# Patient Record
Sex: FEMALE | Race: WHITE | NOT HISPANIC OR LATINO
[De-identification: names, ages, dates, MRNs, and addresses within clinical notes are randomized per-mention and may not be internally consistent; named-entity substitution may affect disease eponyms.]

---

## 2020-10-02 PROBLEM — Z00.00 ENCOUNTER FOR PREVENTIVE HEALTH EXAMINATION: Status: ACTIVE | Noted: 2020-10-02

## 2020-10-05 ENCOUNTER — APPOINTMENT (OUTPATIENT)
Dept: CARDIOLOGY | Facility: CLINIC | Age: 65
End: 2020-10-05
Payer: COMMERCIAL

## 2020-10-05 ENCOUNTER — NON-APPOINTMENT (OUTPATIENT)
Age: 65
End: 2020-10-05

## 2020-10-05 VITALS
HEIGHT: 68 IN | OXYGEN SATURATION: 100 % | BODY MASS INDEX: 24.71 KG/M2 | WEIGHT: 163 LBS | HEART RATE: 67 BPM | DIASTOLIC BLOOD PRESSURE: 87 MMHG | SYSTOLIC BLOOD PRESSURE: 129 MMHG

## 2020-10-05 PROCEDURE — 99205 OFFICE O/P NEW HI 60 MIN: CPT

## 2020-10-05 PROCEDURE — 93000 ELECTROCARDIOGRAM COMPLETE: CPT

## 2020-10-23 ENCOUNTER — NON-APPOINTMENT (OUTPATIENT)
Age: 65
End: 2020-10-23

## 2020-10-23 NOTE — REASON FOR VISIT
[Consultation] : a consultation regarding [Mitral Regurgitation] : mitral regurgitation [Prosthetic Valve] : a prosthetic valve

## 2020-10-23 NOTE — PHYSICAL EXAM
[General Appearance - Well Developed] : well developed [Normal Appearance] : normal appearance [Well Groomed] : well groomed [General Appearance - Well Nourished] : well nourished [No Deformities] : no deformities [General Appearance - In No Acute Distress] : no acute distress [Normal Conjunctiva] : the conjunctiva exhibited no abnormalities [Eyelids - No Xanthelasma] : the eyelids demonstrated no xanthelasmas [Normal Oral Mucosa] : normal oral mucosa [No Oral Pallor] : no oral pallor [No Oral Cyanosis] : no oral cyanosis [Normal Jugular Venous A Waves Present] : normal jugular venous A waves present [Normal Jugular Venous V Waves Present] : normal jugular venous V waves present [No Jugular Venous Padilla A Waves] : no jugular venous padilla A waves [Heart Rate And Rhythm] : heart rate and rhythm were normal [Heart Sounds] : normal S1 and S2 [Systolic grade ___/6] : A grade [unfilled]/6 systolic murmur was heard. [Respiration, Rhythm And Depth] : normal respiratory rhythm and effort [Exaggerated Use Of Accessory Muscles For Inspiration] : no accessory muscle use [Auscultation Breath Sounds / Voice Sounds] : lungs were clear to auscultation bilaterally [Abdomen Soft] : soft [Abdomen Tenderness] : non-tender [Abdomen Mass (___ Cm)] : no abdominal mass palpated [Abnormal Walk] : normal gait [Gait - Sufficient For Exercise Testing] : the gait was sufficient for exercise testing [Nail Clubbing] : no clubbing of the fingernails [Cyanosis, Localized] : no localized cyanosis [Petechial Hemorrhages (___cm)] : no petechial hemorrhages [Skin Color & Pigmentation] : normal skin color and pigmentation [] : no rash [No Venous Stasis] : no venous stasis [Skin Lesions] : no skin lesions [No Skin Ulcers] : no skin ulcer [No Xanthoma] : no  xanthoma was observed [Oriented To Time, Place, And Person] : oriented to person, place, and time [Affect] : the affect was normal [Mood] : the mood was normal [No Anxiety] : not feeling anxious

## 2020-10-23 NOTE — DISCUSSION/SUMMARY
[FreeTextEntry1] : Patient with a bioprosthetic valve (29mm Giovanna Jackson) in the mitral position with prosthetic valve dysfunction.  A case can be made for either repeat surgical valve replacement or a mitral valve in valve procedure.  This was discussed with the patient as well as the need for a diagnostic right and left heart catheterization.

## 2020-10-26 ENCOUNTER — APPOINTMENT (OUTPATIENT)
Dept: DISASTER EMERGENCY | Facility: CLINIC | Age: 65
End: 2020-10-26

## 2020-10-26 DIAGNOSIS — Z01.818 ENCOUNTER FOR OTHER PREPROCEDURAL EXAMINATION: ICD-10-CM

## 2020-10-27 LAB — SARS-COV-2 N GENE NPH QL NAA+PROBE: NOT DETECTED

## 2020-10-28 ENCOUNTER — TRANSCRIPTION ENCOUNTER (OUTPATIENT)
Age: 65
End: 2020-10-28

## 2020-10-28 ENCOUNTER — OUTPATIENT (OUTPATIENT)
Dept: OUTPATIENT SERVICES | Facility: HOSPITAL | Age: 65
LOS: 1 days | End: 2020-10-28
Payer: COMMERCIAL

## 2020-10-28 VITALS
DIASTOLIC BLOOD PRESSURE: 69 MMHG | OXYGEN SATURATION: 98 % | SYSTOLIC BLOOD PRESSURE: 113 MMHG | TEMPERATURE: 98 F | RESPIRATION RATE: 18 BRPM | HEART RATE: 57 BPM

## 2020-10-28 VITALS — RESPIRATION RATE: 18 BRPM | DIASTOLIC BLOOD PRESSURE: 76 MMHG | HEART RATE: 58 BPM | SYSTOLIC BLOOD PRESSURE: 150 MMHG

## 2020-10-28 DIAGNOSIS — Z95.2 PRESENCE OF PROSTHETIC HEART VALVE: Chronic | ICD-10-CM

## 2020-10-28 DIAGNOSIS — R06.00 DYSPNEA, UNSPECIFIED: ICD-10-CM

## 2020-10-28 DIAGNOSIS — Z98.890 OTHER SPECIFIED POSTPROCEDURAL STATES: Chronic | ICD-10-CM

## 2020-10-28 DIAGNOSIS — Z95.2 PRESENCE OF PROSTHETIC HEART VALVE: ICD-10-CM

## 2020-10-28 LAB
ANION GAP SERPL CALC-SCNC: 11 MMOL/L — SIGNIFICANT CHANGE UP (ref 5–17)
APTT BLD: 29.4 SEC — SIGNIFICANT CHANGE UP (ref 27.5–35.5)
BUN SERPL-MCNC: 26 MG/DL — HIGH (ref 8–20)
CALCIUM SERPL-MCNC: 9.1 MG/DL — SIGNIFICANT CHANGE UP (ref 8.6–10.2)
CHLORIDE SERPL-SCNC: 102 MMOL/L — SIGNIFICANT CHANGE UP (ref 98–107)
CO2 SERPL-SCNC: 26 MMOL/L — SIGNIFICANT CHANGE UP (ref 22–29)
CREAT SERPL-MCNC: 0.76 MG/DL — SIGNIFICANT CHANGE UP (ref 0.5–1.3)
GLUCOSE SERPL-MCNC: 114 MG/DL — HIGH (ref 70–99)
HCT VFR BLD CALC: 37.5 % — SIGNIFICANT CHANGE UP (ref 34.5–45)
HGB BLD-MCNC: 12.2 G/DL — SIGNIFICANT CHANGE UP (ref 11.5–15.5)
INR BLD: 1.03 RATIO — SIGNIFICANT CHANGE UP (ref 0.88–1.16)
MAGNESIUM SERPL-MCNC: 1.9 MG/DL — SIGNIFICANT CHANGE UP (ref 1.6–2.6)
MCHC RBC-ENTMCNC: 30.3 PG — SIGNIFICANT CHANGE UP (ref 27–34)
MCHC RBC-ENTMCNC: 32.5 GM/DL — SIGNIFICANT CHANGE UP (ref 32–36)
MCV RBC AUTO: 93.3 FL — SIGNIFICANT CHANGE UP (ref 80–100)
PLATELET # BLD AUTO: 210 K/UL — SIGNIFICANT CHANGE UP (ref 150–400)
POTASSIUM SERPL-MCNC: 4.6 MMOL/L — SIGNIFICANT CHANGE UP (ref 3.5–5.3)
POTASSIUM SERPL-SCNC: 4.6 MMOL/L — SIGNIFICANT CHANGE UP (ref 3.5–5.3)
PROTHROM AB SERPL-ACNC: 11.9 SEC — SIGNIFICANT CHANGE UP (ref 10.6–13.6)
RBC # BLD: 4.02 M/UL — SIGNIFICANT CHANGE UP (ref 3.8–5.2)
RBC # FLD: 12.1 % — SIGNIFICANT CHANGE UP (ref 10.3–14.5)
SODIUM SERPL-SCNC: 139 MMOL/L — SIGNIFICANT CHANGE UP (ref 135–145)
WBC # BLD: 5.56 K/UL — SIGNIFICANT CHANGE UP (ref 3.8–10.5)
WBC # FLD AUTO: 5.56 K/UL — SIGNIFICANT CHANGE UP (ref 3.8–10.5)

## 2020-10-28 PROCEDURE — 93460 R&L HRT ART/VENTRICLE ANGIO: CPT

## 2020-10-28 PROCEDURE — C1760: CPT

## 2020-10-28 PROCEDURE — C1725: CPT

## 2020-10-28 PROCEDURE — 93010 ELECTROCARDIOGRAM REPORT: CPT

## 2020-10-28 PROCEDURE — 83735 ASSAY OF MAGNESIUM: CPT

## 2020-10-28 PROCEDURE — C1769: CPT

## 2020-10-28 PROCEDURE — 80048 BASIC METABOLIC PNL TOTAL CA: CPT

## 2020-10-28 PROCEDURE — 99152 MOD SED SAME PHYS/QHP 5/>YRS: CPT

## 2020-10-28 PROCEDURE — 85610 PROTHROMBIN TIME: CPT

## 2020-10-28 PROCEDURE — 85027 COMPLETE CBC AUTOMATED: CPT

## 2020-10-28 PROCEDURE — 93005 ELECTROCARDIOGRAM TRACING: CPT

## 2020-10-28 PROCEDURE — C1894: CPT

## 2020-10-28 PROCEDURE — 85730 THROMBOPLASTIN TIME PARTIAL: CPT

## 2020-10-28 PROCEDURE — 36415 COLL VENOUS BLD VENIPUNCTURE: CPT

## 2020-10-28 PROCEDURE — C1889: CPT

## 2020-10-28 PROCEDURE — 99153 MOD SED SAME PHYS/QHP EA: CPT

## 2020-10-28 PROCEDURE — C1887: CPT

## 2020-10-28 NOTE — DISCHARGE NOTE PROVIDER - HOSPITAL COURSE
Narrative: 66 y/o F, never smoker, with a PMHx of left breast cancer (s/p XRT and lumpectomy; 2009), BioMVR with MAZE (Bovine), pAF (not on ac) presents today in anticipation of a right and left heart cardiac catheterization with Dr. Wilkerson due to progressive STEWART. Pt reports she is getting winded with minimal exertion/activity over the last 3 weeks.  She reports occasional lower extremity edema as well as increased girth notable to her abdomen. She denies chest pain and  palpitations. She is not on anticoagulation; she reports she was formerly on warfarin, however, it was discontinued 1 year ago and she is maintained on aspirin since then.  She is also on dofetilide and metoprolol for pAF; she is currently in NSR.  Of note, prior to her MVR in 2013; her cardiac catheterization report revealed no obstructive disease.    Echo 8/26/20 normal LV function, EF 60%, LA mildly dilated, interatrial septum with small PFO, well visualized and abnormally functioning bioprosthetic (bovine) valve in mitral position, mild MS, mod MR, mod severe eccentric TR  SARINA 9/19/20 The LV wall motion is normal, RV global function is normal, no thrombus, bioprosthetic mitral valve, moderate prosthesis regurgitation transvalvular moderate prosthesis stenosis, aortic valve is thin, trileaflet and normal cusp separation, moderate to severe TR, mild atheroma in the ascending aorta; TR Vmax 3.07m/s    Now s/p R&LHC via RRA and RBV (no site complications)  VENTRICLES: Global left ventricular function was normal. EF calculated by  contrast ventriculography was 65 %. A bio-prosthetic valve is noted in the  Mitral position, moving in consonance with the surrounding structures.  VALVES: MITRAL VALVE: The mitral valve exhibited mild regurgitation.  CORONARY VESSELS: The coronary circulation is right dominant.  LM:   --  LM: Normal.  LAD:   --  LAD: Normal.  CX:   --  Circumflex: Normal.  RCA:   --  RCA: Normal.  COMPLICATIONS: There were no complications. No complications occurred  during the cath lab visit.  DIAGNOSTIC IMPRESSIONS: Moderate Pulmonary Hypertension. 2. A  Bio-prosthesis is noted in the Mitral postion moving in consonance with  the surrounding structures and MVA of 1.9cm2 and mild to moderate MR. 3.  Normal Coronary Arteries.  DIAGNOSTIC RECOMMENDATIONS: OMT. 2. RFM. 3. May resume OAC tonite if  successful hemostasis achieved with stable cath sites.  INTERVENTIONAL IMPRESSIONS: Moderate Pulmonary Hypertension. 2. A  Bio-prosthesis is noted in the Mitral postion moving inconsonance with  the surrounding structures and MVA of 1.9cm2 and mild to moderate MR. 3.  Normal Coronary Arteries.  INTERVENTIONAL RECOMMENDATIONS: OMT. 2. RFM. 3. May resume OAC tonite if  successful hemostasis achieved with stable cath sites.  Prepared and signed by  John Wilkerson M.D.

## 2020-10-28 NOTE — H&P PST ADULT - HISTORY OF PRESENT ILLNESS
Narrative: 64 y/o F, never smoker, with a PMHx of left breast cancer (s/p XRT and lumpectomy), BioMVR with MAZE (Bovine; on coumadin), atrial fibrillation presents today in anticipation of a right and left heart cardiac catheterization with Dr. Wilkerson due to progressive STEWART.     Echo 8/26/20 normal LV function, EF 60%, LA mildly dilated, interatrial septum with small PFO, well visualized and abnormally functioning bioprosthetic (bovine) valve in mitral position, mild MS, mod MR, mod severe eccentric TR  SARINA 9/19/20 The LV wall motion is normal, RV global function is normal, no thrombus, bioprosthetic mitral valve, moderate prosthesis regurgitation transvalvular moderate prosthesis stenosis, aortic valve is thin, trileaflet and normal cusp separation, moderate to severe TR, mild atheroma in the ascending aorta; TR Vmax 3.07m/s Narrative: 66 y/o F, never smoker, with a PMHx of left breast cancer (s/p XRT and lumpectomy), BioMVR with MAZE (Bovine; on coumadin), atrial fibrillation presents today in anticipation of a right and left heart cardiac catheterization with Dr. Wilkerson due to progressive STEWART.     Echo 8/26/20 normal LV function, EF 60%, LA mildly dilated, interatrial septum with small PFO, well visualized and abnormally functioning bioprosthetic (bovine) valve in mitral position, mild MS, mod MR, mod severe eccentric TR  SARINA 9/19/20 The LV wall motion is normal, RV global function is normal, no thrombus, bioprosthetic mitral valve, moderate prosthesis regurgitation transvalvular moderate prosthesis stenosis, aortic valve is thin, trileaflet and normal cusp separation, moderate to severe TR, mild atheroma in the ascending aorta; TR Vmax 3.07m/s    ASA  MALL  BRA Narrative: 64 y/o F, never smoker, with a PMHx of left breast cancer (s/p XRT and lumpectomy), BioMVR with MAZE (Bovine), pAF (not on ac) presents today in anticipation of a right and left heart cardiac catheterization with Dr. Wilkerson due to progressive STEWART. Pt reports she is getting winded with minimal exertion/activity over the last 3 weeks.  She reports occasional lower extremity edema as well as increased girth notable to her abdomen. She denies chest pain and  palpitations. She is not on anticoagulation; she reports she was formerly on warfarin, however, it was discontinued 1 year ago and she is maintained on aspirin since then.  She is also on dofetilide and metoprolol for pAF; she is currently in NSR.  Of note, prior to her MVR in 2013; her cardiac catheterization report revealed no obstructive disease.    Echo 8/26/20 normal LV function, EF 60%, LA mildly dilated, interatrial septum with small PFO, well visualized and abnormally functioning bioprosthetic (bovine) valve in mitral position, mild MS, mod MR, mod severe eccentric TR  SARINA 9/19/20 The LV wall motion is normal, RV global function is normal, no thrombus, bioprosthetic mitral valve, moderate prosthesis regurgitation transvalvular moderate prosthesis stenosis, aortic valve is thin, trileaflet and normal cusp separation, moderate to severe TR, mild atheroma in the ascending aorta; TR Vmax 3.07m/s    ASA 3  MALL 2  BRA 2.0% Narrative: 66 y/o F, never smoker, with a PMHx of left breast cancer (s/p XRT and lumpectomy; 2009), BioMVR with MAZE (Bovine), pAF (not on ac) presents today in anticipation of a right and left heart cardiac catheterization with Dr. Wilkerson due to progressive STEWART. Pt reports she is getting winded with minimal exertion/activity over the last 3 weeks.  She reports occasional lower extremity edema as well as increased girth notable to her abdomen. She denies chest pain and  palpitations. She is not on anticoagulation; she reports she was formerly on warfarin, however, it was discontinued 1 year ago and she is maintained on aspirin since then.  She is also on dofetilide and metoprolol for pAF; she is currently in NSR.  Of note, prior to her MVR in 2013; her cardiac catheterization report revealed no obstructive disease.    Echo 8/26/20 normal LV function, EF 60%, LA mildly dilated, interatrial septum with small PFO, well visualized and abnormally functioning bioprosthetic (bovine) valve in mitral position, mild MS, mod MR, mod severe eccentric TR  SARINA 9/19/20 The LV wall motion is normal, RV global function is normal, no thrombus, bioprosthetic mitral valve, moderate prosthesis regurgitation transvalvular moderate prosthesis stenosis, aortic valve is thin, trileaflet and normal cusp separation, moderate to severe TR, mild atheroma in the ascending aorta; TR Vmax 3.07m/s    ASA 3  MALL 2  BRA 2.0%

## 2020-10-28 NOTE — H&P PST ADULT - NEGATIVE CARDIOVASCULAR SYMPTOMS
no peripheral edema/no chest pain/no claudication/no orthopnea/no paroxysmal nocturnal dyspnea/no palpitations

## 2020-10-28 NOTE — DISCHARGE NOTE NURSING/CASE MANAGEMENT/SOCIAL WORK - PATIENT PORTAL LINK FT
You can access the FollowMyHealth Patient Portal offered by [facility] by registering at the following website: [email or web address]. By joining [facility]’s FollowMyHealth portal, you will also be able to view your health information using other applications (apps) compatible with our system.

## 2020-10-28 NOTE — H&P PST ADULT - NSICDXPASTSURGICALHX_GEN_ALL_CORE_FT
PAST SURGICAL HISTORY:  H/O prosthetic mitral valve bioprosthetic bovine 2013    S/P breast lumpectomy left

## 2020-10-28 NOTE — DISCHARGE NOTE PROVIDER - CARE PROVIDER_API CALL
IVETT JARAMILLO  [id number]  [address] [facility] [address]  [place] [address]  Phone: [phone]  Fax: [phone]  Follow Up Time:

## 2020-10-28 NOTE — DISCHARGE NOTE PROVIDER - NSDCCPCAREPLAN_GEN_ALL_CORE_FT
PRINCIPAL DISCHARGE DIAGNOSIS  Diagnosis: Dyspnea on exertion  Assessment and Plan of Treatment: -right radial artery precautions and right brachial vein precautions  -normal coronary vasculature and normal right heart pressures  -follow up with Dr. Schwartz  -continue all of your medications as directed       PRINCIPAL DISCHARGE DIAGNOSIS  Diagnosis: Dyspnea on exertion  Assessment and Plan of Treatment: -right radial artery precautions and right brachial vein precautions  -normal coronary vasculature and normal right heart pressures  -follow up with Dr. Schwartz  -continue all of your medications as directed  -diet and exercise modifications

## 2020-10-28 NOTE — DISCHARGE NOTE PROVIDER - NSDCCPTREATMENT_GEN_ALL_CORE_FT
PRINCIPAL PROCEDURE  Procedure: Combined right and left heart cardiac catheterization  Findings and Treatment: Restricted use with no heavy lifting of affected arm for 48 hours.  No submerging the arm in water for 48 hours.  You may start showering today.  Call your doctor for any bleeding, swelling, loss of sensation in the hand or fingers, or fingers turning blue.  If heavy bleeding or large lumps form, hold pressure at the spot and come to the Emergency Room.

## 2020-10-28 NOTE — DISCHARGE NOTE PROVIDER - NSDCMRMEDTOKEN_GEN_ALL_CORE_FT
Aspir 81 oral delayed release tablet: 1 tab(s) orally once a day  Metoprolol Succinate ER 25 mg oral tablet, extended release: 1 tab(s) orally once a day  rosuvastatin 20 mg oral tablet: 1 tab(s) orally once a day  Tikosyn 500 mcg oral capsule: 1 cap(s) orally 2 times a day

## 2020-10-28 NOTE — H&P PST ADULT - OTHER CARE PROVIDERS
Dr. Schwartz ([facility]), Dr. Wilkerson (Interventional Cardiologist), Dr. Zhang (CT Surgeon)

## 2020-10-28 NOTE — H&P PST ADULT - NSICDXPROBLEM_GEN_ALL_CORE_FT
PROBLEM DIAGNOSES  Problem: STEWART (dyspnea on exertion)  Assessment and Plan: -R&LHC with Dr. Wilkerson  -procedure d/w patient, risks and benefits explained, questions answered  -asa 81mg po pre cath

## 2020-10-28 NOTE — H&P PST ADULT - NEGATIVE NEUROLOGICAL SYMPTOMS
no vertigo/no loss of sensation/no transient paralysis/no weakness/no paresthesias/no headache/no loss of consciousness/no syncope/no tremors/no difficulty walking/no generalized seizures/no focal seizures

## 2020-10-28 NOTE — PROGRESS NOTE ADULT - SUBJECTIVE AND OBJECTIVE BOX
Department of Cardiology                                                                  [facility]/[place] [address] [address]                                                            Telephone: [phone]. Fax:[phone]                                             Post- Procedure Note: Right and Left Heart Cardiac Catheterization       Narrative:  65y  Female is now s/p right and left heart catheterization via right radial and brachial approach with Dr. Wilkerson  Right radial precautions  normal coronary vasculature  normal right heart pressures  mild TR  no cardiac intervention  Heparin used: 5,000 units  Contrast used: omnipaque 67ml         PAST MEDICAL & SURGICAL HISTORY:  Breast cancer  left; s/p lumpectomy and XRT    Afib    H/O prosthetic mitral valve  bioprosthetic bovine 2013    S/P breast lumpectomy  left    Home Medications:  Aspir 81 oral delayed release tablet: 1 tab(s) orally once a day (28 Oct 2020 09:55)  Metoprolol Succinate ER 25 mg oral tablet, extended release: 1 tab(s) orally once a day (28 Oct 2020 09:55)  rosuvastatin 20 mg oral tablet: 1 tab(s) orally once a day (28 Oct 2020 09:55)  Tikosyn 500 mcg oral capsule: 1 cap(s) orally 2 times a day (28 Oct 2020 09:55)    Patient is a 65y old  Female who presents with a chief complaint of right and left heart cardiac catheterization due to progressive STEWART (28 Oct 2020 11:39)    HEALTH ISSUES - PROBLEM Dx:  STEWART (dyspnea on exertion)    HPI:  Narrative: 66 y/o F, never smoker, with a PMHx of left breast cancer (s/p XRT and lumpectomy; 2009), BioMVR with MAZE (Bovine), pAF (not on ac) presents today in anticipation of a right and left heart cardiac catheterization with Dr. Wilkerson due to progressive STEWART. Pt reports she is getting winded with minimal exertion/activity over the last 3 weeks.  She reports occasional lower extremity edema as well as increased girth notable to her abdomen. She denies chest pain and  palpitations. She is not on anticoagulation; she reports she was formerly on warfarin, however, it was discontinued 1 year ago and she is maintained on aspirin since then.  She is also on dofetilide and metoprolol for pAF; she is currently in NSR.  Of note, prior to her MVR in 2013; her cardiac catheterization report revealed no obstructive disease.    Echo 8/26/20 normal LV function, EF 60%, LA mildly dilated, interatrial septum with small PFO, well visualized and abnormally functioning bioprosthetic (bovine) valve in mitral position, mild MS, mod MR, mod severe eccentric TR  SARINA 9/19/20 The LV wall motion is normal, RV global function is normal, no thrombus, bioprosthetic mitral valve, moderate prosthesis regurgitation transvalvular moderate prosthesis stenosis, aortic valve is thin, trileaflet and normal cusp separation, moderate to severe TR, mild atheroma in the ascending aorta; TR Vmax 3.07m/s    ASA 3  MALL 2  BRA 2.0% (28 Oct 2020 07:52)    General: No fatigue, no fevers/chills  Respiratory: No dyspnea, no cough, no wheeze  CV: No chest pain, no palpitations  Abd: No nausea  Neuro: No headache, no dizziness      Objective:  Vital Signs Last 24 Hrs  T(C): 36.6 (28 Oct 2020 09:15), Max: 36.6 (28 Oct 2020 09:15)  T(F): 97.8 (28 Oct 2020 09:15), Max: 97.8 (28 Oct 2020 09:15)  HR: 52 (28 Oct 2020 11:45) (51 - 57)  BP: 153/72 (28 Oct 2020 11:45) (113/69 - 153/72)  BP(mean): --  RR: 18 (28 Oct 2020 11:45) (18 - 18)  SpO2: 96% (28 Oct 2020 11:45) (95% - 98%)    CM: SR  Neuro: A&OX3, CN 2-12 intact  HEENT: NC, AT  Lungs: CTA B/L  CV: S1, S2, no murmur, RRR  Abd: Soft  Right Radial cath site: band in place, no bleeding, no hematoma  Extremity: + distal pulses                          12.2   5.56  )-----------( 210      ( 28 Oct 2020 09:11 )             37.5     10-28    139  |  102  |  26.0<H>  ----------------------------<  114<H>  4.6   |  26.0  |  0.76    Ca    9.1      28 Oct 2020 09:11  Mg     1.9     10-28      PT/INR - ( 28 Oct 2020 09:11 )   PT: 11.9 sec;   INR: 1.03 ratio    PTT - ( 28 Oct 2020 09:11 )  PTT:29.4 sec      65y  Female is now s/p right and left heart catheterization via right radial and brachial approach with Dr. Wilkerson    -post cardiac cath orders  -radial precautions  -remove radial band 1 hour post cath  -continue current medical therapy  -follow up outpt in 2 weeks with Cardiologist, [address]. Schwartz  -discussed plan of care with patient  -discussed diet and exercise modifications with patient  -dc home 1 hour post radial band removal if site ok

## 2021-06-05 NOTE — H&P PST ADULT - PRIMARY CARE PROVIDER
Pt arrived to unit from ED via transport. Transferred to bed from cart. Pts daughter bedside on arrival. Pt A&Ox4, tachy & hypertensive on 2L NC. MD notified, orders received. Tele applied per orders. 4 eyes skin assessment w/ Kami NCT. Pt denies pain at this time. All belongings bedside. Call light placed within reach. Pt in stable condition, will continue to monitor.    Dr. Tayo White (PCP)

## 2022-05-16 PROBLEM — I48.91 UNSPECIFIED ATRIAL FIBRILLATION: Chronic | Status: ACTIVE | Noted: 2020-10-28

## 2022-05-16 PROBLEM — C50.919 MALIGNANT NEOPLASM OF UNSPECIFIED SITE OF UNSPECIFIED FEMALE BREAST: Chronic | Status: ACTIVE | Noted: 2020-10-28

## 2022-06-08 ENCOUNTER — APPOINTMENT (OUTPATIENT)
Dept: ORTHOPEDIC SURGERY | Facility: CLINIC | Age: 67
End: 2022-06-08

## 2023-03-01 ENCOUNTER — OFFICE (OUTPATIENT)
Dept: URBAN - METROPOLITAN AREA CLINIC 103 | Facility: CLINIC | Age: 68
Setting detail: OPHTHALMOLOGY
End: 2023-03-01
Payer: MEDICARE

## 2023-03-01 DIAGNOSIS — D31.31: ICD-10-CM

## 2023-03-01 DIAGNOSIS — L71.0: ICD-10-CM

## 2023-03-01 DIAGNOSIS — H16.223: ICD-10-CM

## 2023-03-01 PROBLEM — H16.222 DRY EYE SYNDROME K SICCA; RIGHT EYE, LEFT EYE, BOTH EYES: Status: ACTIVE | Noted: 2023-03-01

## 2023-03-01 PROBLEM — H16.221 DRY EYE SYNDROME K SICCA; RIGHT EYE, LEFT EYE, BOTH EYES: Status: ACTIVE | Noted: 2023-03-01

## 2023-03-01 PROCEDURE — 92250 FUNDUS PHOTOGRAPHY W/I&R: CPT | Performed by: OPHTHALMOLOGY

## 2023-03-01 PROCEDURE — 92014 COMPRE OPH EXAM EST PT 1/>: CPT | Performed by: OPHTHALMOLOGY

## 2023-03-01 PROCEDURE — 83861 MICROFLUID ANALY TEARS: CPT | Performed by: OPHTHALMOLOGY

## 2023-03-01 ASSESSMENT — REFRACTION_MANIFEST
OS_AXIS: 35
OD_SPHERE: +0.75
OD_SPHERE: PLANO
OD_VA1: 20/20-
OD_VA1: 20/25-1
OS_ADD: +2.25
OD_CYLINDER: -0.50
OS_CYLINDER: SPH
OS_VA1: 20/20
OS_VA1: 20/25-2
OD_ADD: +2.25
OS_SPHERE: PLANO
OD_AXIS: 120
OS_SPHERE: +0.50
OU_VA: 20/20-1
OS_CYLINDER: -0.75
OD_CYLINDER: SPH

## 2023-03-01 ASSESSMENT — KERATOMETRY
OS_AXISANGLE_DEGREES: 112
OD_K2POWER_DIOPTERS: 44.25
OD_K1POWER_DIOPTERS: 43.50
OD_AXISANGLE_DEGREES: 067
METHOD_AUTO_MANUAL: AUTO
OS_K1POWER_DIOPTERS: 44.00
OS_K2POWER_DIOPTERS: 44.50

## 2023-03-01 ASSESSMENT — REFRACTION_AUTOREFRACTION
OD_CYLINDER: -0.50
OS_CYLINDER: -0.50
OS_SPHERE: PLANO
OS_AXIS: 032
OD_AXIS: 158
OD_SPHERE: +0.25

## 2023-03-01 ASSESSMENT — AXIALLENGTH_DERIVED: OD_AL: 23.455

## 2023-03-01 ASSESSMENT — CONFRONTATIONAL VISUAL FIELD TEST (CVF)
OD_FINDINGS: FULL
OS_FINDINGS: FULL

## 2023-03-01 ASSESSMENT — REFRACTION_CURRENTRX
OS_ADD: +1.50
OS_OVR_VA: 20/
OS_VPRISM_DIRECTION: SV
OD_OVR_VA: 20/
OD_ADD: +1.50
OD_VPRISM_DIRECTION: SV

## 2023-03-01 ASSESSMENT — VISUAL ACUITY
OD_BCVA: 20/20
OS_BCVA: 20/20

## 2023-03-01 ASSESSMENT — SUPERFICIAL PUNCTATE KERATITIS (SPK)
OD_SPK: ABSENT
OS_SPK: T

## 2023-03-01 ASSESSMENT — TONOMETRY
OD_IOP_MMHG: 11
OS_IOP_MMHG: 13

## 2023-03-01 ASSESSMENT — SPHEQUIV_DERIVED: OD_SPHEQUIV: 0

## 2023-09-06 ENCOUNTER — OFFICE (OUTPATIENT)
Dept: URBAN - METROPOLITAN AREA CLINIC 103 | Facility: CLINIC | Age: 68
Setting detail: OPHTHALMOLOGY
End: 2023-09-06
Payer: MEDICARE

## 2023-09-06 DIAGNOSIS — D31.31: ICD-10-CM

## 2023-09-06 PROBLEM — H16.223 DRY EYE SYNDROME K SICCA; RIGHT EYE, LEFT EYE, BOTH EYES: Status: ACTIVE | Noted: 2023-09-06

## 2023-09-06 PROBLEM — H16.222 DRY EYE SYNDROME K SICCA; RIGHT EYE, LEFT EYE, BOTH EYES: Status: ACTIVE | Noted: 2023-09-06

## 2023-09-06 PROBLEM — H16.221 DRY EYE SYNDROME K SICCA; RIGHT EYE, LEFT EYE, BOTH EYES: Status: ACTIVE | Noted: 2023-09-06

## 2023-09-06 PROCEDURE — 92014 COMPRE OPH EXAM EST PT 1/>: CPT | Performed by: OPHTHALMOLOGY

## 2023-09-06 PROCEDURE — 92201 OPSCPY EXTND RTA DRAW UNI/BI: CPT | Performed by: OPHTHALMOLOGY

## 2023-09-06 ASSESSMENT — REFRACTION_MANIFEST
OS_CYLINDER: SPH
OD_VA1: 20/25-1
OD_VA1: 20/20-
OS_SPHERE: +0.50
OD_CYLINDER: -0.50
OS_CYLINDER: -0.75
OD_ADD: +2.25
OS_VA1: 20/20
OD_AXIS: 120
OD_SPHERE: +0.75
OD_CYLINDER: SPH
OU_VA: 20/20-1
OS_SPHERE: PLANO
OS_AXIS: 35
OD_SPHERE: PLANO
OS_VA1: 20/25-2
OS_ADD: +2.25

## 2023-09-06 ASSESSMENT — REFRACTION_AUTOREFRACTION
OS_SPHERE: PLANO
OD_AXIS: 166
OS_AXIS: 15
OS_CYLINDER: -0.50
OD_SPHERE: PLANO
OD_CYLINDER: -0.50

## 2023-09-06 ASSESSMENT — KERATOMETRY
OS_AXISANGLE_DEGREES: 108
OD_K1POWER_DIOPTERS: 43.25
OS_K1POWER_DIOPTERS: 43.75
OS_K2POWER_DIOPTERS: 44.25
METHOD_AUTO_MANUAL: AUTO
OD_AXISANGLE_DEGREES: 076
OD_K2POWER_DIOPTERS: 44.25

## 2023-09-06 ASSESSMENT — REFRACTION_CURRENTRX
OD_OVR_VA: 20/
OS_ADD: +1.50
OD_ADD: +1.50
OS_OVR_VA: 20/
OD_VPRISM_DIRECTION: SV
OS_VPRISM_DIRECTION: SV

## 2023-09-06 ASSESSMENT — VISUAL ACUITY
OS_BCVA: 20/25+2
OD_BCVA: 20/20

## 2023-09-06 ASSESSMENT — SUPERFICIAL PUNCTATE KERATITIS (SPK)
OD_SPK: ABSENT
OS_SPK: T

## 2023-09-06 ASSESSMENT — CONFRONTATIONAL VISUAL FIELD TEST (CVF)
OD_FINDINGS: FULL
OS_FINDINGS: FULL

## 2023-09-06 ASSESSMENT — TONOMETRY
OD_IOP_MMHG: 11
OS_IOP_MMHG: 12

## 2023-11-14 ENCOUNTER — APPOINTMENT (OUTPATIENT)
Dept: CARDIOTHORACIC SURGERY | Facility: CLINIC | Age: 68
End: 2023-11-14
Payer: MEDICARE

## 2023-11-14 VITALS
TEMPERATURE: 98 F | DIASTOLIC BLOOD PRESSURE: 92 MMHG | HEART RATE: 71 BPM | RESPIRATION RATE: 16 BRPM | WEIGHT: 162 LBS | BODY MASS INDEX: 24.55 KG/M2 | SYSTOLIC BLOOD PRESSURE: 146 MMHG | OXYGEN SATURATION: 97 % | HEIGHT: 68 IN

## 2023-11-14 DIAGNOSIS — Z85.3 PERSONAL HISTORY OF MALIGNANT NEOPLASM OF BREAST: ICD-10-CM

## 2023-11-14 DIAGNOSIS — Z86.39 PERSONAL HISTORY OF OTHER ENDOCRINE, NUTRITIONAL AND METABOLIC DISEASE: ICD-10-CM

## 2023-11-14 DIAGNOSIS — Z80.6 FAMILY HISTORY OF LEUKEMIA: ICD-10-CM

## 2023-11-14 DIAGNOSIS — Z78.9 OTHER SPECIFIED HEALTH STATUS: ICD-10-CM

## 2023-11-14 DIAGNOSIS — Z60.2 PROBLEMS RELATED TO LIVING ALONE: ICD-10-CM

## 2023-11-14 DIAGNOSIS — Z80.3 FAMILY HISTORY OF MALIGNANT NEOPLASM OF BREAST: ICD-10-CM

## 2023-11-14 PROCEDURE — 99204 OFFICE O/P NEW MOD 45 MIN: CPT

## 2023-11-14 RX ORDER — ASPIRIN ENTERIC COATED TABLETS 81 MG 81 MG/1
81 TABLET, DELAYED RELEASE ORAL DAILY
Refills: 0 | Status: COMPLETED | COMMUNITY
Start: 2020-10-05 | End: 2023-11-14

## 2023-11-14 RX ORDER — DOFETILIDE 0.5 MG/1
500 CAPSULE ORAL TWICE DAILY
Refills: 0 | Status: COMPLETED | COMMUNITY
Start: 2020-08-11 | End: 2023-11-14

## 2023-11-14 RX ORDER — METRONIDAZOLE 7.5 MG/G
0.75 CREAM TOPICAL
Qty: 45 | Refills: 0 | Status: COMPLETED | COMMUNITY
Start: 2020-07-27 | End: 2023-11-14

## 2023-11-14 SDOH — SOCIAL STABILITY - SOCIAL INSECURITY: PROBLEMS RELATED TO LIVING ALONE: Z60.2

## 2024-01-02 ENCOUNTER — APPOINTMENT (OUTPATIENT)
Dept: CARDIOTHORACIC SURGERY | Facility: CLINIC | Age: 69
End: 2024-01-02

## 2024-01-05 ENCOUNTER — INPATIENT (INPATIENT)
Facility: HOSPITAL | Age: 69
LOS: 3 days | Discharge: HOME HEALTH | DRG: 314 | End: 2024-01-09
Attending: THORACIC SURGERY (CARDIOTHORACIC VASCULAR SURGERY) | Admitting: THORACIC SURGERY (CARDIOTHORACIC VASCULAR SURGERY)
Payer: MEDICARE

## 2024-01-05 VITALS
DIASTOLIC BLOOD PRESSURE: 66 MMHG | TEMPERATURE: 98 F | SYSTOLIC BLOOD PRESSURE: 131 MMHG | HEART RATE: 86 BPM | RESPIRATION RATE: 18 BRPM | OXYGEN SATURATION: 94 %

## 2024-01-05 DIAGNOSIS — Z98.890 OTHER SPECIFIED POSTPROCEDURAL STATES: Chronic | ICD-10-CM

## 2024-01-05 DIAGNOSIS — I36.1 NONRHEUMATIC TRICUSPID (VALVE) INSUFFICIENCY: ICD-10-CM

## 2024-01-05 DIAGNOSIS — Z95.2 PRESENCE OF PROSTHETIC HEART VALVE: Chronic | ICD-10-CM

## 2024-01-05 LAB
A1C WITH ESTIMATED AVERAGE GLUCOSE RESULT: 5.6 % — SIGNIFICANT CHANGE UP (ref 4–5.6)
A1C WITH ESTIMATED AVERAGE GLUCOSE RESULT: 5.6 % — SIGNIFICANT CHANGE UP (ref 4–5.6)
ABO RH CONFIRMATION: SIGNIFICANT CHANGE UP
ABO RH CONFIRMATION: SIGNIFICANT CHANGE UP
ALBUMIN SERPL ELPH-MCNC: 3.9 G/DL — SIGNIFICANT CHANGE UP (ref 3.3–5.2)
ALBUMIN SERPL ELPH-MCNC: 3.9 G/DL — SIGNIFICANT CHANGE UP (ref 3.3–5.2)
ALP SERPL-CCNC: 89 U/L — SIGNIFICANT CHANGE UP (ref 40–120)
ALP SERPL-CCNC: 89 U/L — SIGNIFICANT CHANGE UP (ref 40–120)
ALT FLD-CCNC: 23 U/L — SIGNIFICANT CHANGE UP
ALT FLD-CCNC: 23 U/L — SIGNIFICANT CHANGE UP
ANION GAP SERPL CALC-SCNC: 13 MMOL/L — SIGNIFICANT CHANGE UP (ref 5–17)
ANION GAP SERPL CALC-SCNC: 13 MMOL/L — SIGNIFICANT CHANGE UP (ref 5–17)
APTT BLD: 29.5 SEC — SIGNIFICANT CHANGE UP (ref 24.5–35.6)
APTT BLD: 29.5 SEC — SIGNIFICANT CHANGE UP (ref 24.5–35.6)
AST SERPL-CCNC: 24 U/L — SIGNIFICANT CHANGE UP
AST SERPL-CCNC: 24 U/L — SIGNIFICANT CHANGE UP
BASOPHILS # BLD AUTO: 0.02 K/UL — SIGNIFICANT CHANGE UP (ref 0–0.2)
BASOPHILS # BLD AUTO: 0.02 K/UL — SIGNIFICANT CHANGE UP (ref 0–0.2)
BASOPHILS NFR BLD AUTO: 0.4 % — SIGNIFICANT CHANGE UP (ref 0–2)
BASOPHILS NFR BLD AUTO: 0.4 % — SIGNIFICANT CHANGE UP (ref 0–2)
BILIRUB SERPL-MCNC: 0.6 MG/DL — SIGNIFICANT CHANGE UP (ref 0.4–2)
BILIRUB SERPL-MCNC: 0.6 MG/DL — SIGNIFICANT CHANGE UP (ref 0.4–2)
BLD GP AB SCN SERPL QL: SIGNIFICANT CHANGE UP
BLD GP AB SCN SERPL QL: SIGNIFICANT CHANGE UP
BUN SERPL-MCNC: 27.9 MG/DL — HIGH (ref 8–20)
BUN SERPL-MCNC: 27.9 MG/DL — HIGH (ref 8–20)
CALCIUM SERPL-MCNC: 9.2 MG/DL — SIGNIFICANT CHANGE UP (ref 8.4–10.5)
CALCIUM SERPL-MCNC: 9.2 MG/DL — SIGNIFICANT CHANGE UP (ref 8.4–10.5)
CHLORIDE SERPL-SCNC: 100 MMOL/L — SIGNIFICANT CHANGE UP (ref 96–108)
CHLORIDE SERPL-SCNC: 100 MMOL/L — SIGNIFICANT CHANGE UP (ref 96–108)
CK SERPL-CCNC: 39 U/L — SIGNIFICANT CHANGE UP (ref 25–170)
CK SERPL-CCNC: 39 U/L — SIGNIFICANT CHANGE UP (ref 25–170)
CO2 SERPL-SCNC: 26 MMOL/L — SIGNIFICANT CHANGE UP (ref 22–29)
CO2 SERPL-SCNC: 26 MMOL/L — SIGNIFICANT CHANGE UP (ref 22–29)
CREAT SERPL-MCNC: 1.28 MG/DL — SIGNIFICANT CHANGE UP (ref 0.5–1.3)
CREAT SERPL-MCNC: 1.28 MG/DL — SIGNIFICANT CHANGE UP (ref 0.5–1.3)
EGFR: 46 ML/MIN/1.73M2 — LOW
EGFR: 46 ML/MIN/1.73M2 — LOW
EOSINOPHIL # BLD AUTO: 0.09 K/UL — SIGNIFICANT CHANGE UP (ref 0–0.5)
EOSINOPHIL # BLD AUTO: 0.09 K/UL — SIGNIFICANT CHANGE UP (ref 0–0.5)
EOSINOPHIL NFR BLD AUTO: 1.6 % — SIGNIFICANT CHANGE UP (ref 0–6)
EOSINOPHIL NFR BLD AUTO: 1.6 % — SIGNIFICANT CHANGE UP (ref 0–6)
ESTIMATED AVERAGE GLUCOSE: 114 MG/DL — SIGNIFICANT CHANGE UP (ref 68–114)
ESTIMATED AVERAGE GLUCOSE: 114 MG/DL — SIGNIFICANT CHANGE UP (ref 68–114)
GLUCOSE SERPL-MCNC: 96 MG/DL — SIGNIFICANT CHANGE UP (ref 70–99)
GLUCOSE SERPL-MCNC: 96 MG/DL — SIGNIFICANT CHANGE UP (ref 70–99)
HCT VFR BLD CALC: 37.5 % — SIGNIFICANT CHANGE UP (ref 34.5–45)
HCT VFR BLD CALC: 37.5 % — SIGNIFICANT CHANGE UP (ref 34.5–45)
HGB BLD-MCNC: 13.1 G/DL — SIGNIFICANT CHANGE UP (ref 11.5–15.5)
HGB BLD-MCNC: 13.1 G/DL — SIGNIFICANT CHANGE UP (ref 11.5–15.5)
IMM GRANULOCYTES NFR BLD AUTO: 0.2 % — SIGNIFICANT CHANGE UP (ref 0–0.9)
IMM GRANULOCYTES NFR BLD AUTO: 0.2 % — SIGNIFICANT CHANGE UP (ref 0–0.9)
INR BLD: 1.13 RATIO — SIGNIFICANT CHANGE UP (ref 0.85–1.18)
INR BLD: 1.13 RATIO — SIGNIFICANT CHANGE UP (ref 0.85–1.18)
LYMPHOCYTES # BLD AUTO: 1.02 K/UL — SIGNIFICANT CHANGE UP (ref 1–3.3)
LYMPHOCYTES # BLD AUTO: 1.02 K/UL — SIGNIFICANT CHANGE UP (ref 1–3.3)
LYMPHOCYTES # BLD AUTO: 18.4 % — SIGNIFICANT CHANGE UP (ref 13–44)
LYMPHOCYTES # BLD AUTO: 18.4 % — SIGNIFICANT CHANGE UP (ref 13–44)
Lab: 244 PRU — SIGNIFICANT CHANGE UP (ref 195–417)
Lab: 244 PRU — SIGNIFICANT CHANGE UP (ref 195–417)
MAGNESIUM SERPL-MCNC: 2 MG/DL — SIGNIFICANT CHANGE UP (ref 1.6–2.6)
MAGNESIUM SERPL-MCNC: 2 MG/DL — SIGNIFICANT CHANGE UP (ref 1.6–2.6)
MCHC RBC-ENTMCNC: 32.8 PG — SIGNIFICANT CHANGE UP (ref 27–34)
MCHC RBC-ENTMCNC: 32.8 PG — SIGNIFICANT CHANGE UP (ref 27–34)
MCHC RBC-ENTMCNC: 34.9 GM/DL — SIGNIFICANT CHANGE UP (ref 32–36)
MCHC RBC-ENTMCNC: 34.9 GM/DL — SIGNIFICANT CHANGE UP (ref 32–36)
MCV RBC AUTO: 94 FL — SIGNIFICANT CHANGE UP (ref 80–100)
MCV RBC AUTO: 94 FL — SIGNIFICANT CHANGE UP (ref 80–100)
MONOCYTES # BLD AUTO: 0.46 K/UL — SIGNIFICANT CHANGE UP (ref 0–0.9)
MONOCYTES # BLD AUTO: 0.46 K/UL — SIGNIFICANT CHANGE UP (ref 0–0.9)
MONOCYTES NFR BLD AUTO: 8.3 % — SIGNIFICANT CHANGE UP (ref 2–14)
MONOCYTES NFR BLD AUTO: 8.3 % — SIGNIFICANT CHANGE UP (ref 2–14)
NEUTROPHILS # BLD AUTO: 3.93 K/UL — SIGNIFICANT CHANGE UP (ref 1.8–7.4)
NEUTROPHILS # BLD AUTO: 3.93 K/UL — SIGNIFICANT CHANGE UP (ref 1.8–7.4)
NEUTROPHILS NFR BLD AUTO: 71.1 % — SIGNIFICANT CHANGE UP (ref 43–77)
NEUTROPHILS NFR BLD AUTO: 71.1 % — SIGNIFICANT CHANGE UP (ref 43–77)
NT-PROBNP SERPL-SCNC: 2806 PG/ML — HIGH (ref 0–300)
NT-PROBNP SERPL-SCNC: 2806 PG/ML — HIGH (ref 0–300)
PA ADP PRP-ACNC: 244 PRU — SIGNIFICANT CHANGE UP (ref 180–376)
PA ADP PRP-ACNC: 244 PRU — SIGNIFICANT CHANGE UP (ref 180–376)
PLATELET # BLD AUTO: 240 K/UL — SIGNIFICANT CHANGE UP (ref 150–400)
PLATELET # BLD AUTO: 240 K/UL — SIGNIFICANT CHANGE UP (ref 150–400)
POTASSIUM SERPL-MCNC: 4.2 MMOL/L — SIGNIFICANT CHANGE UP (ref 3.5–5.3)
POTASSIUM SERPL-MCNC: 4.2 MMOL/L — SIGNIFICANT CHANGE UP (ref 3.5–5.3)
POTASSIUM SERPL-SCNC: 4.2 MMOL/L — SIGNIFICANT CHANGE UP (ref 3.5–5.3)
POTASSIUM SERPL-SCNC: 4.2 MMOL/L — SIGNIFICANT CHANGE UP (ref 3.5–5.3)
PREALB SERPL-MCNC: 20 MG/DL — SIGNIFICANT CHANGE UP (ref 18–38)
PREALB SERPL-MCNC: 20 MG/DL — SIGNIFICANT CHANGE UP (ref 18–38)
PROT SERPL-MCNC: 6.5 G/DL — LOW (ref 6.6–8.7)
PROT SERPL-MCNC: 6.5 G/DL — LOW (ref 6.6–8.7)
PROTHROM AB SERPL-ACNC: 12.5 SEC — SIGNIFICANT CHANGE UP (ref 9.5–13)
PROTHROM AB SERPL-ACNC: 12.5 SEC — SIGNIFICANT CHANGE UP (ref 9.5–13)
RBC # BLD: 3.99 M/UL — SIGNIFICANT CHANGE UP (ref 3.8–5.2)
RBC # BLD: 3.99 M/UL — SIGNIFICANT CHANGE UP (ref 3.8–5.2)
RBC # FLD: 14.6 % — HIGH (ref 10.3–14.5)
RBC # FLD: 14.6 % — HIGH (ref 10.3–14.5)
SODIUM SERPL-SCNC: 139 MMOL/L — SIGNIFICANT CHANGE UP (ref 135–145)
SODIUM SERPL-SCNC: 139 MMOL/L — SIGNIFICANT CHANGE UP (ref 135–145)
TROPONIN T, HIGH SENSITIVITY RESULT: 14 NG/L — SIGNIFICANT CHANGE UP (ref 0–51)
TROPONIN T, HIGH SENSITIVITY RESULT: 14 NG/L — SIGNIFICANT CHANGE UP (ref 0–51)
TSH SERPL-MCNC: 8.01 UIU/ML — HIGH (ref 0.27–4.2)
TSH SERPL-MCNC: 8.01 UIU/ML — HIGH (ref 0.27–4.2)
WBC # BLD: 5.53 K/UL — SIGNIFICANT CHANGE UP (ref 3.8–10.5)
WBC # BLD: 5.53 K/UL — SIGNIFICANT CHANGE UP (ref 3.8–10.5)
WBC # FLD AUTO: 5.53 K/UL — SIGNIFICANT CHANGE UP (ref 3.8–10.5)
WBC # FLD AUTO: 5.53 K/UL — SIGNIFICANT CHANGE UP (ref 3.8–10.5)

## 2024-01-05 PROCEDURE — 93010 ELECTROCARDIOGRAM REPORT: CPT

## 2024-01-05 PROCEDURE — 71045 X-RAY EXAM CHEST 1 VIEW: CPT | Mod: 26

## 2024-01-05 RX ORDER — DOFETILIDE 0.25 MG/1
1 CAPSULE ORAL
Qty: 0 | Refills: 0 | DISCHARGE

## 2024-01-05 RX ORDER — ATORVASTATIN CALCIUM 80 MG/1
80 TABLET, FILM COATED ORAL AT BEDTIME
Refills: 0 | Status: DISCONTINUED | OUTPATIENT
Start: 2024-01-05 | End: 2024-01-09

## 2024-01-05 RX ORDER — MUPIROCIN 20 MG/G
1 OINTMENT TOPICAL EVERY 12 HOURS
Refills: 0 | Status: DISCONTINUED | OUTPATIENT
Start: 2024-01-05 | End: 2024-01-09

## 2024-01-05 RX ORDER — FUROSEMIDE 40 MG
20 TABLET ORAL DAILY
Refills: 0 | Status: DISCONTINUED | OUTPATIENT
Start: 2024-01-06 | End: 2024-01-06

## 2024-01-05 RX ORDER — HEPARIN SODIUM 5000 [USP'U]/ML
900 INJECTION INTRAVENOUS; SUBCUTANEOUS
Qty: 25000 | Refills: 0 | Status: DISCONTINUED | OUTPATIENT
Start: 2024-01-05 | End: 2024-01-06

## 2024-01-05 RX ORDER — PANTOPRAZOLE SODIUM 20 MG/1
40 TABLET, DELAYED RELEASE ORAL
Refills: 0 | Status: DISCONTINUED | OUTPATIENT
Start: 2024-01-05 | End: 2024-01-09

## 2024-01-05 RX ORDER — METOPROLOL TARTRATE 50 MG
50 TABLET ORAL
Refills: 0 | Status: DISCONTINUED | OUTPATIENT
Start: 2024-01-05 | End: 2024-01-06

## 2024-01-05 RX ORDER — ASPIRIN/CALCIUM CARB/MAGNESIUM 324 MG
1 TABLET ORAL
Qty: 0 | Refills: 0 | DISCHARGE

## 2024-01-05 RX ORDER — SODIUM CHLORIDE 9 MG/ML
3 INJECTION INTRAMUSCULAR; INTRAVENOUS; SUBCUTANEOUS EVERY 8 HOURS
Refills: 0 | Status: DISCONTINUED | OUTPATIENT
Start: 2024-01-05 | End: 2024-01-09

## 2024-01-05 RX ORDER — METOPROLOL TARTRATE 50 MG
1 TABLET ORAL
Qty: 0 | Refills: 0 | DISCHARGE

## 2024-01-05 RX ADMIN — ATORVASTATIN CALCIUM 80 MILLIGRAM(S): 80 TABLET, FILM COATED ORAL at 22:57

## 2024-01-05 RX ADMIN — HEPARIN SODIUM 900 UNIT(S)/HR: 5000 INJECTION INTRAVENOUS; SUBCUTANEOUS at 23:02

## 2024-01-05 RX ADMIN — SODIUM CHLORIDE 3 MILLILITER(S): 9 INJECTION INTRAMUSCULAR; INTRAVENOUS; SUBCUTANEOUS at 22:05

## 2024-01-06 DIAGNOSIS — C50.919 MALIGNANT NEOPLASM OF UNSPECIFIED SITE OF UNSPECIFIED FEMALE BREAST: ICD-10-CM

## 2024-01-06 DIAGNOSIS — I07.1 RHEUMATIC TRICUSPID INSUFFICIENCY: ICD-10-CM

## 2024-01-06 DIAGNOSIS — I48.92 UNSPECIFIED ATRIAL FLUTTER: ICD-10-CM

## 2024-01-06 DIAGNOSIS — Z29.9 ENCOUNTER FOR PROPHYLACTIC MEASURES, UNSPECIFIED: ICD-10-CM

## 2024-01-06 DIAGNOSIS — I50.33 ACUTE ON CHRONIC DIASTOLIC (CONGESTIVE) HEART FAILURE: ICD-10-CM

## 2024-01-06 DIAGNOSIS — I05.0 RHEUMATIC MITRAL STENOSIS: ICD-10-CM

## 2024-01-06 DIAGNOSIS — I48.91 UNSPECIFIED ATRIAL FIBRILLATION: ICD-10-CM

## 2024-01-06 DIAGNOSIS — I50.21 ACUTE SYSTOLIC (CONGESTIVE) HEART FAILURE: ICD-10-CM

## 2024-01-06 DIAGNOSIS — R79.89 OTHER SPECIFIED ABNORMAL FINDINGS OF BLOOD CHEMISTRY: ICD-10-CM

## 2024-01-06 LAB
APPEARANCE UR: CLEAR — SIGNIFICANT CHANGE UP
APPEARANCE UR: CLEAR — SIGNIFICANT CHANGE UP
APTT BLD: 64.9 SEC — HIGH (ref 24.5–35.6)
APTT BLD: 64.9 SEC — HIGH (ref 24.5–35.6)
APTT BLD: 78.4 SEC — HIGH (ref 24.5–35.6)
APTT BLD: 78.4 SEC — HIGH (ref 24.5–35.6)
BILIRUB UR-MCNC: NEGATIVE — SIGNIFICANT CHANGE UP
BILIRUB UR-MCNC: NEGATIVE — SIGNIFICANT CHANGE UP
COLOR SPEC: YELLOW — SIGNIFICANT CHANGE UP
COLOR SPEC: YELLOW — SIGNIFICANT CHANGE UP
DIFF PNL FLD: NEGATIVE — SIGNIFICANT CHANGE UP
DIFF PNL FLD: NEGATIVE — SIGNIFICANT CHANGE UP
GLUCOSE UR QL: NEGATIVE MG/DL — SIGNIFICANT CHANGE UP
GLUCOSE UR QL: NEGATIVE MG/DL — SIGNIFICANT CHANGE UP
HCT VFR BLD CALC: 35.3 % — SIGNIFICANT CHANGE UP (ref 34.5–45)
HCT VFR BLD CALC: 35.3 % — SIGNIFICANT CHANGE UP (ref 34.5–45)
HGB BLD-MCNC: 11.9 G/DL — SIGNIFICANT CHANGE UP (ref 11.5–15.5)
HGB BLD-MCNC: 11.9 G/DL — SIGNIFICANT CHANGE UP (ref 11.5–15.5)
KETONES UR-MCNC: NEGATIVE MG/DL — SIGNIFICANT CHANGE UP
KETONES UR-MCNC: NEGATIVE MG/DL — SIGNIFICANT CHANGE UP
LEUKOCYTE ESTERASE UR-ACNC: NEGATIVE — SIGNIFICANT CHANGE UP
LEUKOCYTE ESTERASE UR-ACNC: NEGATIVE — SIGNIFICANT CHANGE UP
MCHC RBC-ENTMCNC: 32 PG — SIGNIFICANT CHANGE UP (ref 27–34)
MCHC RBC-ENTMCNC: 32 PG — SIGNIFICANT CHANGE UP (ref 27–34)
MCHC RBC-ENTMCNC: 33.7 GM/DL — SIGNIFICANT CHANGE UP (ref 32–36)
MCHC RBC-ENTMCNC: 33.7 GM/DL — SIGNIFICANT CHANGE UP (ref 32–36)
MCV RBC AUTO: 94.9 FL — SIGNIFICANT CHANGE UP (ref 80–100)
MCV RBC AUTO: 94.9 FL — SIGNIFICANT CHANGE UP (ref 80–100)
MRSA PCR RESULT.: SIGNIFICANT CHANGE UP
MRSA PCR RESULT.: SIGNIFICANT CHANGE UP
NITRITE UR-MCNC: NEGATIVE — SIGNIFICANT CHANGE UP
NITRITE UR-MCNC: NEGATIVE — SIGNIFICANT CHANGE UP
PH UR: 6.5 — SIGNIFICANT CHANGE UP (ref 5–8)
PH UR: 6.5 — SIGNIFICANT CHANGE UP (ref 5–8)
PLATELET # BLD AUTO: 202 K/UL — SIGNIFICANT CHANGE UP (ref 150–400)
PLATELET # BLD AUTO: 202 K/UL — SIGNIFICANT CHANGE UP (ref 150–400)
PROT UR-MCNC: NEGATIVE MG/DL — SIGNIFICANT CHANGE UP
PROT UR-MCNC: NEGATIVE MG/DL — SIGNIFICANT CHANGE UP
RBC # BLD: 3.72 M/UL — LOW (ref 3.8–5.2)
RBC # BLD: 3.72 M/UL — LOW (ref 3.8–5.2)
RBC # FLD: 14.6 % — HIGH (ref 10.3–14.5)
RBC # FLD: 14.6 % — HIGH (ref 10.3–14.5)
S AUREUS DNA NOSE QL NAA+PROBE: DETECTED
S AUREUS DNA NOSE QL NAA+PROBE: DETECTED
SP GR SPEC: 1.02 — SIGNIFICANT CHANGE UP (ref 1–1.03)
SP GR SPEC: 1.02 — SIGNIFICANT CHANGE UP (ref 1–1.03)
UROBILINOGEN FLD QL: 1 MG/DL — SIGNIFICANT CHANGE UP (ref 0.2–1)
UROBILINOGEN FLD QL: 1 MG/DL — SIGNIFICANT CHANGE UP (ref 0.2–1)
WBC # BLD: 6.78 K/UL — SIGNIFICANT CHANGE UP (ref 3.8–10.5)
WBC # BLD: 6.78 K/UL — SIGNIFICANT CHANGE UP (ref 3.8–10.5)
WBC # FLD AUTO: 6.78 K/UL — SIGNIFICANT CHANGE UP (ref 3.8–10.5)
WBC # FLD AUTO: 6.78 K/UL — SIGNIFICANT CHANGE UP (ref 3.8–10.5)

## 2024-01-06 PROCEDURE — 99223 1ST HOSP IP/OBS HIGH 75: CPT | Mod: FS

## 2024-01-06 PROCEDURE — 99222 1ST HOSP IP/OBS MODERATE 55: CPT

## 2024-01-06 PROCEDURE — 93010 ELECTROCARDIOGRAM REPORT: CPT

## 2024-01-06 PROCEDURE — 93306 TTE W/DOPPLER COMPLETE: CPT | Mod: 26

## 2024-01-06 PROCEDURE — 93880 EXTRACRANIAL BILAT STUDY: CPT | Mod: 26

## 2024-01-06 RX ORDER — ACETAMINOPHEN 500 MG
650 TABLET ORAL EVERY 6 HOURS
Refills: 0 | Status: DISCONTINUED | OUTPATIENT
Start: 2024-01-06 | End: 2024-01-09

## 2024-01-06 RX ORDER — FUROSEMIDE 40 MG
20 TABLET ORAL DAILY
Refills: 0 | Status: DISCONTINUED | OUTPATIENT
Start: 2024-01-06 | End: 2024-01-09

## 2024-01-06 RX ORDER — APIXABAN 2.5 MG/1
5 TABLET, FILM COATED ORAL EVERY 12 HOURS
Refills: 0 | Status: DISCONTINUED | OUTPATIENT
Start: 2024-01-06 | End: 2024-01-07

## 2024-01-06 RX ORDER — SPIRONOLACTONE 25 MG/1
25 TABLET, FILM COATED ORAL DAILY
Refills: 0 | Status: DISCONTINUED | OUTPATIENT
Start: 2024-01-06 | End: 2024-01-09

## 2024-01-06 RX ORDER — METOPROLOL TARTRATE 50 MG
50 TABLET ORAL EVERY 8 HOURS
Refills: 0 | Status: DISCONTINUED | OUTPATIENT
Start: 2024-01-06 | End: 2024-01-09

## 2024-01-06 RX ADMIN — Medication 650 MILLIGRAM(S): at 18:45

## 2024-01-06 RX ADMIN — Medication 50 MILLIGRAM(S): at 05:47

## 2024-01-06 RX ADMIN — Medication 20 MILLIGRAM(S): at 05:47

## 2024-01-06 RX ADMIN — ATORVASTATIN CALCIUM 80 MILLIGRAM(S): 80 TABLET, FILM COATED ORAL at 21:21

## 2024-01-06 RX ADMIN — HEPARIN SODIUM 900 UNIT(S)/HR: 5000 INJECTION INTRAVENOUS; SUBCUTANEOUS at 07:12

## 2024-01-06 RX ADMIN — SODIUM CHLORIDE 3 MILLILITER(S): 9 INJECTION INTRAMUSCULAR; INTRAVENOUS; SUBCUTANEOUS at 21:21

## 2024-01-06 RX ADMIN — SODIUM CHLORIDE 3 MILLILITER(S): 9 INJECTION INTRAMUSCULAR; INTRAVENOUS; SUBCUTANEOUS at 13:26

## 2024-01-06 RX ADMIN — Medication 50 MILLIGRAM(S): at 15:46

## 2024-01-06 RX ADMIN — Medication 650 MILLIGRAM(S): at 17:43

## 2024-01-06 RX ADMIN — MUPIROCIN 1 APPLICATION(S): 20 OINTMENT TOPICAL at 05:48

## 2024-01-06 RX ADMIN — APIXABAN 5 MILLIGRAM(S): 2.5 TABLET, FILM COATED ORAL at 17:23

## 2024-01-06 RX ADMIN — Medication 50 MILLIGRAM(S): at 21:21

## 2024-01-06 RX ADMIN — PANTOPRAZOLE SODIUM 40 MILLIGRAM(S): 20 TABLET, DELAYED RELEASE ORAL at 05:47

## 2024-01-06 RX ADMIN — MUPIROCIN 1 APPLICATION(S): 20 OINTMENT TOPICAL at 17:28

## 2024-01-06 RX ADMIN — SODIUM CHLORIDE 3 MILLILITER(S): 9 INJECTION INTRAMUSCULAR; INTRAVENOUS; SUBCUTANEOUS at 05:51

## 2024-01-06 RX ADMIN — HEPARIN SODIUM 900 UNIT(S)/HR: 5000 INJECTION INTRAVENOUS; SUBCUTANEOUS at 05:49

## 2024-01-06 NOTE — PHYSICAL THERAPY INITIAL EVALUATION ADULT - GENERAL OBSERVATIONS, REHAB EVAL
Pt received in bed, + IV heparin + tele/, breathing on RA in NAD, in 0/10 pain, agreeable to PT eval

## 2024-01-06 NOTE — H&P ADULT - NSHPLABSRESULTS_GEN_ALL_CORE
13.1   5.53  )-----------( 240      ( 05 Jan 2024 21:15 )             37.5     01-05    139  |  100  |  27.9<H>  ----------------------------<  96  4.2   |  26.0  |  1.28    Ca    9.2      05 Jan 2024 21:15  Mg     2.0     01-05    TPro  6.5<L>  /  Alb  3.9  /  TBili  0.6  /  DBili  x   /  AST  24  /  ALT  23  /  AlkPhos  89  01-05    PT/INR - ( 05 Jan 2024 21:15 )   PT: 12.5 sec;   INR: 1.13 ratio      PTT - ( 05 Jan 2024 21:15 )  PTT:29.5 sec    P2Y12 Plt Response Test (01.05.24 @ 21:15)    P2Y12 Plt Reactivity: 244    A1C with Estimated Average Glucose (01.05.24 @ 21:15)    A1C with Estimated Average Glucose Result: 5.6 %    Estimated Average Glucose: 114 mg/dL    Thyroid Stimulating Hormone, Serum (01.05.24 @ 21:15)    Thyroid Stimulating Hormone, Serum: 8.01 uIU/mL    Prealbumin, Serum (01.05.24 @ 21:15)    Prealbumin, Serum: 20 mg/dL    Pro-Brain Natriuretic Peptide (01.05.24 @ 21:15)    Pro-Brain Natriuretic Peptide: 2806    Urinalysis Basic - ( 05 Jan 2024 21:15 )  Color: x / Appearance: x / SG: x / pH: x  Gluc: 96 mg/dL / Ketone: x  / Bili: x / Urobili: x   Blood: x / Protein: x / Nitrite: x   Leuk Esterase: x / RBC: x / WBC x   Sq Epi: x / Non Sq Epi: x / Bacteria: x

## 2024-01-06 NOTE — CONSULT NOTE ADULT - ASSESSMENT
68 year old female with a PMHx of left breast cancer (s/p XRT and lumpectomy; 2009), BioMVR with MAZE (Bovine) in 2013 at [facility], pAF (on Eliquis), HTN, originally presented to [facility] with 3 weeks of worsening SOB and STEWART, acute on chronic diastolic CHF exacerbation, with SARINA showing severe prosthetic mitral stenosis and severe TR, s/p LHC with no obstructive CAD noted. Patient ultimately transferred to [facility] under Dr. Jimeenz for surgical evaluation. Cardiology consulted for management of Afib/Flutter  68 year old female with a PMHx of left breast cancer (s/p XRT and lumpectomy; 2009), BioMVR with MAZE (Bovine) in 2013 at [facility], pAF (on Eliquis), HTN, originally presented to [facility] with 3 weeks of worsening SOB and STEWART, acute on chronic diastolic CHF exacerbation, with SARINA showing severe prosthetic mitral stenosis and severe TR, s/p LHC with no obstructive CAD noted. Patient ultimately transferred to [facility] under Dr. Jimenez for surgical evaluation. Cardiology consulted for management of Afib/Flutter

## 2024-01-06 NOTE — CONSULT NOTE ADULT - NS ATTEND AMEND GEN_ALL_CORE FT
prosthetic mitral stenosis.   decompensated due to recurrence of afib flutter after developmend of recent covid  tachcyardia uptil 110.  at rest.  NYHA class 3 -4 symptoms.     need aggressive rate control. HR goal <70 atleast.  atrial flutter.  on anticoagulation heparin.    increase beta-blocker . if not able to control heart rate then need to plan for cardioversion to optimize her   NPo after midnigh on Sunday if rate not controlled by monday then consider SARINA and cardioversion  also discuss with samm that she may need to take Injectiopn lovenox as bruidging when she is holding eliqwuis for 2 days prior to her Open heart MV replacement   ct maintanance diuresis

## 2024-01-06 NOTE — H&P ADULT - NEGATIVE NEUROLOGICAL SYMPTOMS
no transient paralysis/no generalized seizures/no focal seizures/no syncope/no difficulty walking/no headache/no loss of consciousness/no hemiparesis/no confusion/no facial palsy

## 2024-01-06 NOTE — CONSULT NOTE ADULT - SUBJECTIVE AND OBJECTIVE BOX
[facility]                                              CARDIOLOGY AT [place] [address]                                             Telephone: [phone]. Fax:[phone]                                                       CARDIOLOGY CONSULTATION NOTE                                                                                             History obtained by: Patient and medical record   Community Cardiologist: Lana    obtained: Yes [  ] No [  ]  Reason for Consultation:   Available out pt records reviewed: Yes [  ] No [  ]    Chief complaint:    Patient is a 68y old  Female who presents with a chief complaint of Severe Bioprosthetic Mitral Stenosis (2024 04:53)      HPI:  68 year old female with a PMHx of left breast cancer (s/p XRT and lumpectomy; ), BioMVR with MAZE (Bovine) in  at [facility], pAF (on Eliquis), HTN, originally presented to [facility] with 3 weeks of worsening SOB and STEWART, acute on chronic diastolic CHF exacerbation, with SARINA showing severe prosthetic mitral stenosis and severe TR, s/p LHC with no obstructive CAD noted. Patient ultimately transferred to [facility] under Dr. Jimenez for surgical evaluation.    (2024 04:53)      PAST MEDICAL HISTORY  Afib    Breast cancer        PAST SURGICAL HISTORY  S/P breast lumpectomy    H/O prosthetic mitral valve        SUBSTANCE USE HISTORY  Denies current and previous substance use [  ]   CIGARETTES -   ALCOHOL -   DRUGS -     FAMILY HISTORY:      CARDIAC SPECIFIC FAMILY HX   No KNOWN family history of Cardiovascular disease, CAD, or sudden death in first degree relatives unless specified below  Family History of Cardiovascular Disease:  [  ]   Coronary Artery Disease in first degree relative:  [  ]   Sudden Cardiac Death in First degree relative: [  ]    HOME MEDICATIONS:  Eliquis 5 mg oral tablet: 1 tab(s) orally 2 times a day (2024 21:25)  metoprolol succinate 50 mg oral capsule, extended release: 1 cap(s) orally 2 times a day (2024 21:26)  rosuvastatin 20 mg oral tablet: 1 tab(s) orally once a day (2024 21:28)      CURRENT CARDIAC MEDICATIONS:  furosemide   Injectable 20 milliGRAM(s) IV Push daily  metoprolol tartrate 50 milliGRAM(s) Oral every 8 hours      CURRENT OTHER MEDICATIONS:  pantoprazole    Tablet 40 milliGRAM(s) Oral before breakfast  apixaban 5 milliGRAM(s) Oral every 12 hours  atorvastatin 80 milliGRAM(s) Oral at bedtime  mupirocin 2% Ointment 1 Application(s) Both Nostrils every 12 hours, Stop order after: 10 Doses  sodium chloride 0.9% lock flush 3 milliLiter(s) IV Push every 8 hours      ALLERGIES:   No Known Allergies      VITAL SIGNS:  T(C): 36.5 (24 @ 08:29), Max: 36.7 (24 @ 23:32)  T(F): 97.7 (24 @ 08:29), Max: 98 (24 @ 23:32)  HR: 109 (24 @ 15:35) (74 - 109)  BP: 100/66 (24 @ 15:35) (96/65 - 134/84)  RR: 20 (24 @ 15:35) (18 - 20)  SpO2: 96% (24 @ 15:35) (94% - 97%)    INTAKE AND OUTPUT:      @ 07:01  -   @ 07:00  --------------------------------------------------------  IN: 321 mL / OUT: 0 mL / NET: 321 mL     @ 07:01  -   @ 16:05  --------------------------------------------------------  IN: 0 mL / OUT: 450 mL / NET: -450 mL        LABS:  ( 2024 21:15 )  Troponin T  X    ,  CPK  39   , CKMB  X    , BNP X                                  11.9   6.78  )-----------(       ( 2024 04:35 )             35.3         139  |  100  |  27.9<H>  ----------------------------<  96  4.2   |  26.0  |  1.28    Ca    9.2      2024 21:15  Mg     2.0         TPro  6.5<L>  /  Alb  3.9  /  TBili  0.6  /  DBili  x   /  AST  24  /  ALT  23  /  AlkPhos  89      PT/INR - ( 2024 21:15 )   PT: 12.5 sec;   INR: 1.13 ratio         PTT - ( 2024 12:50 )  PTT:78.4 sec  Urinalysis Basic - ( 2024 12:12 )    Color: Yellow / Appearance: Clear / S.018 / pH: x  Gluc: x / Ketone: Negative mg/dL  / Bili: Negative / Urobili: 1.0 mg/dL   Blood: x / Protein: Negative mg/dL / Nitrite: Negative   Leuk Esterase: Negative / RBC: x / WBC x   Sq Epi: x / Non Sq Epi: x / Bacteria: x    Thyroid Stimulating Hormone, Serum: 8.01 uIU/mL (24 @ 21:15)      RADIOLOGY IMAGING:   Xray Chest 1 View- PORTABLE-Routine: AM   Indication: eval lung fields  Transport: Portable (24 @ 10:48) [Ordered.]  12 Lead ECG (24 @ 18:24) [Results Available]                                                          [facility]                                              CARDIOLOGY AT [place] [address]                                             Telephone: [phone]. Fax:[phone]                                                       CARDIOLOGY CONSULTATION NOTE                                                                                             History obtained by: Patient and medical record   Community Cardiologist: Lana    obtained: Yes [  ] No [  ]  Reason for Consultation:   Available out pt records reviewed: Yes [  ] No [  ]    Chief complaint:    Patient is a 68y old  Female who presents with a chief complaint of Severe Bioprosthetic Mitral Stenosis (2024 04:53)      HPI:  68 year old female with a PMHx of left breast cancer (s/p XRT and lumpectomy; ), BioMVR with MAZE (Bovine) in  at [facility], pAF (on Eliquis), HTN, originally presented to [facility] with 3 weeks of worsening SOB and STEWART, acute on chronic diastolic CHF exacerbation, with SARINA showing severe prosthetic mitral stenosis and severe TR, s/p LHC with no obstructive CAD noted. Patient ultimately transferred to [facility] under Dr. Jimenez for surgical evaluation.    (2024 04:53)      PAST MEDICAL HISTORY  Afib    Breast cancer        PAST SURGICAL HISTORY  S/P breast lumpectomy    H/O prosthetic mitral valve        SUBSTANCE USE HISTORY  Denies current and previous substance use [  ]   CIGARETTES -   ALCOHOL -   DRUGS -     FAMILY HISTORY:      CARDIAC SPECIFIC FAMILY HX   No KNOWN family history of Cardiovascular disease, CAD, or sudden death in first degree relatives unless specified below  Family History of Cardiovascular Disease:  [  ]   Coronary Artery Disease in first degree relative:  [  ]   Sudden Cardiac Death in First degree relative: [  ]    HOME MEDICATIONS:  Eliquis 5 mg oral tablet: 1 tab(s) orally 2 times a day (2024 21:25)  metoprolol succinate 50 mg oral capsule, extended release: 1 cap(s) orally 2 times a day (2024 21:26)  rosuvastatin 20 mg oral tablet: 1 tab(s) orally once a day (2024 21:28)      CURRENT CARDIAC MEDICATIONS:  furosemide   Injectable 20 milliGRAM(s) IV Push daily  metoprolol tartrate 50 milliGRAM(s) Oral every 8 hours      CURRENT OTHER MEDICATIONS:  pantoprazole    Tablet 40 milliGRAM(s) Oral before breakfast  apixaban 5 milliGRAM(s) Oral every 12 hours  atorvastatin 80 milliGRAM(s) Oral at bedtime  mupirocin 2% Ointment 1 Application(s) Both Nostrils every 12 hours, Stop order after: 10 Doses  sodium chloride 0.9% lock flush 3 milliLiter(s) IV Push every 8 hours      ALLERGIES:   No Known Allergies      VITAL SIGNS:  T(C): 36.5 (24 @ 08:29), Max: 36.7 (24 @ 23:32)  T(F): 97.7 (24 @ 08:29), Max: 98 (24 @ 23:32)  HR: 109 (24 @ 15:35) (74 - 109)  BP: 100/66 (24 @ 15:35) (96/65 - 134/84)  RR: 20 (24 @ 15:35) (18 - 20)  SpO2: 96% (24 @ 15:35) (94% - 97%)    INTAKE AND OUTPUT:      @ 07:01  -   @ 07:00  --------------------------------------------------------  IN: 321 mL / OUT: 0 mL / NET: 321 mL     @ 07:01  -   @ 16:05  --------------------------------------------------------  IN: 0 mL / OUT: 450 mL / NET: -450 mL        LABS:  ( 2024 21:15 )  Troponin T  X    ,  CPK  39   , CKMB  X    , BNP X                                  11.9   6.78  )-----------(       ( 2024 04:35 )             35.3         139  |  100  |  27.9<H>  ----------------------------<  96  4.2   |  26.0  |  1.28    Ca    9.2      2024 21:15  Mg     2.0         TPro  6.5<L>  /  Alb  3.9  /  TBili  0.6  /  DBili  x   /  AST  24  /  ALT  23  /  AlkPhos  89      PT/INR - ( 2024 21:15 )   PT: 12.5 sec;   INR: 1.13 ratio         PTT - ( 2024 12:50 )  PTT:78.4 sec  Urinalysis Basic - ( 2024 12:12 )    Color: Yellow / Appearance: Clear / S.018 / pH: x  Gluc: x / Ketone: Negative mg/dL  / Bili: Negative / Urobili: 1.0 mg/dL   Blood: x / Protein: Negative mg/dL / Nitrite: Negative   Leuk Esterase: Negative / RBC: x / WBC x   Sq Epi: x / Non Sq Epi: x / Bacteria: x    Thyroid Stimulating Hormone, Serum: 8.01 uIU/mL (24 @ 21:15)      RADIOLOGY IMAGING:   Xray Chest 1 View- PORTABLE-Routine: AM   Indication: eval lung fields  Transport: Portable (24 @ 10:48) [Ordered.]  12 Lead ECG (24 @ 18:24) [Results Available]

## 2024-01-06 NOTE — H&P ADULT - PROBLEM SELECTOR PLAN 5
Heparin gtt and SCDs for DVT prophylaxis  Protonix for GI prophylaxis.. H/o Left breast cancer (s/p XRT and lumpectomy; 2009).

## 2024-01-06 NOTE — H&P ADULT - ASSESSMENT
Tried to call pt for INR reminder.   Pt's only number listed in demographics, rings and rings, no VM. No other numbers.  Will send another letter.     INR   Date Value Ref Range Status   11/13/2018 1.7  Final     POC INR   Date Value Ref Range Status   12/12/2017 6.9 (A) 0.9 - 1.2 Final     Comment:     lot# [id number] exp 10/31/18 NDC [id number] QC Pass     Mariajose Reyes, PharmD     68 year old female with a PMHx of left breast cancer (s/p XRT and lumpectomy; 2009), BioMVR with MAZE (Bovine) in 2013 at [facility], pAF (on Eliquis), HTN, originally presented to [facility] with 3 weeks of worsening SOB and STEWART, acute on chronic diastolic CHF exacerbation, with SARINA showing severe prosthetic mitral stenosis and severe TR, s/p LHC with no obstructive CAD noted. Patient ultimately transferred to [facility] under Dr. Jimenez for surgical evaluation.    68 year old female with a PMHx of left breast cancer (s/p XRT and lumpectomy; 2009), BioMVR with MAZE (Bovine) in 2013 at [facility], pAF (on Eliquis), HTN, originally presented to [facility] with 3 weeks of worsening SOB and STEWART, acute on chronic diastolic CHF exacerbation, with SARINA showing severe prosthetic mitral stenosis and severe TR, s/p LHC with no obstructive CAD noted. Patient ultimately transferred to [facility] under Dr. Jimenez for surgical evaluation.

## 2024-01-06 NOTE — PHYSICAL THERAPY INITIAL EVALUATION ADULT - ADDITIONAL COMMENTS
pt reports living in private home, alone, 2-3 JOHANA with no handrails and 10 + landing + 8 inside with handrail. Independent prior to admission. Owns no DME.

## 2024-01-06 NOTE — H&P ADULT - NSHPPHYSICALEXAM_GEN_ALL_CORE
General: Sitting up in bed in NAD  Neuro: A+O x 3, non-focal, speech clear and intact  HEENT:  NCAT, No conjuctival edema or icterus, no thrush.   Neck: Supple, trachea midline  Pulm: +Diminished BSs at bases bilaterally R>L, no rales/rhonchi/wheezing, no accessory muscle use noted  CV: regular rate, irregularly irregular rhythm, +S1S2, +murmur   Abd: soft, NT, ND, + BS  Ext: CHAPMAN x 4, trace edema, no cyanosis, distal motor/neuro/circ intact  Skin: warm, dry, perfused

## 2024-01-06 NOTE — H&P ADULT - PROBLEM SELECTOR PLAN 3
H/o MAZE procedure in 2013, with recurrent PAF since.   Patient takes Eliquis 5BID as an outpatient. Eliquis held while inpatient.   Continue Heparin gtt for full anticoagulation.   Continue Lopressor as tolerated by HR and BP for HR control.

## 2024-01-06 NOTE — CONSULT NOTE ADULT - PROBLEM SELECTOR RECOMMENDATION 9
- plan for open MVR  - needs optimization before surgical procedures  - optimized already from volume standpoint but difficult to maintain due to MS/TR and uncontrolled HR  - will plan for SARINA/DCCV monday   - continue eliquis Q12h   - transition to lasix 20mg daily PO   - add farxiga 5mg daily and spironolactone 25mg daily  - will need to use lovenox as outpatient if eliquis is on hold

## 2024-01-06 NOTE — PHYSICAL THERAPY INITIAL EVALUATION ADULT - PERTINENT HX OF CURRENT PROBLEM, REHAB EVAL
68 year old female with a PMHx of left breast cancer (s/p XRT and lumpectomy; 2009), BioMVR with MAZE (Bovine) in 2013 at [facility], pAF (on Eliquis), HTN, originally presented to [facility] with 3 weeks of worsening SOB and STEWART, acute on chronic diastolic CHF exacerbation, with SRAINA showing severe prosthetic mitral stenosis and severe TR, s/p LHC with no obstructive CAD noted. Patient ultimately transferred to [facility] under Dr. Jimenez for surgical evaluation. 68 year old female with a PMHx of left breast cancer (s/p XRT and lumpectomy; 2009), BioMVR with MAZE (Bovine) in 2013 at [facility], pAF (on Eliquis), HTN, originally presented to [facility] with 3 weeks of worsening SOB and STEWART, acute on chronic diastolic CHF exacerbation, with SARINA showing severe prosthetic mitral stenosis and severe TR, s/p LHC with no obstructive CAD noted. Patient ultimately transferred to [facility] under Dr. Jimenez for surgical evaluation.

## 2024-01-06 NOTE — CONSULT NOTE ADULT - PROBLEM SELECTOR RECOMMENDATION 2
- plan for SARINA/DCCV   - maintain heparin infusion full AC protocol   - eventual transition to eliquis before DC   - increase metoprolol from 50mg BID to TID   - monitor on telemetry

## 2024-01-06 NOTE — H&P ADULT - PROBLEM SELECTOR PLAN 1
Originally presented to [facility] with 3 weeks of worsening SOB and STEWART, acute on chronic diastolic CHF exacerbation, with SARINA showing severe prosthetic mitral stenosis and severe TR.   S/p LHC with no obstructive CAD noted.   Patient ultimately transferred to [facility] under Dr. Jimenez for surgical evaluation.     Preoperative workup underway.  Follow up labs, UA, PFTs, CXR, b/l carotid US, and repeat TTE.   Continue Heparin gtt for full anticoagulation with PAF.  Continue Lasix 20IV QD for diuresis as tolerated by renal function and SBP.  CTA chest performed at [place]. Patient had radiation to the Left chest and will be a reop.  Surgeon to review imaging.   Plan to be discussed / reviewed with CT Surgery attending / team during AM rounds. Originally presented to [facility] with 3 weeks of worsening SOB and STEWART, acute on chronic diastolic CHF exacerbation, with SARINA showing severe prosthetic mitral stenosis and severe TR.   S/p LHC with no obstructive CAD noted.   Patient ultimately transferred to [facility] under Dr. Jimenez for surgical evaluation.     Preoperative workup underway.  Follow up labs, UA, PFTs, CXR, b/l carotid US, and repeat TTE.   Continue Heparin gtt for full anticoagulation with PAF.  Continue Lasix 20IV QD for diuresis as tolerated by renal function and SBP.  CTA chest performed at [place]. Patient had radiation to the Left chest and will be a reop.  Surgeon to review imaging.   Plan to be discussed / reviewed with CT Surgery attending / team during AM rounds. Originally presented to [facility] with 3 weeks of worsening SOB and STEWART, acute on chronic diastolic CHF exacerbation, with SARINA showing Severe Prosthetic Mitral Stenosis, Moderate MR, and Severe TR.   S/p LHC with no obstructive CAD noted.   Patient ultimately transferred to [facility] under Dr. Jimenez for surgical evaluation.     Preoperative workup underway.  Follow up labs, UA, PFTs, CXR, b/l carotid US, and repeat TTE.   Continue Heparin gtt for full anticoagulation with PAF.  Continue Lasix 20IV QD for diuresis as tolerated by renal function and SBP.  CTA chest performed at [place]. Patient had radiation to the Left chest and will be a reop.  Surgeon to review imaging.   Plan to be discussed / reviewed with CT Surgery attending / team during AM rounds. Originally presented to [facility] with 3 weeks of worsening SOB and STEWART, acute on chronic diastolic CHF exacerbation, with SARINA showing Severe Prosthetic Mitral Stenosis, Moderate MR, and Severe TR.   S/p LHC with no obstructive CAD noted.   Patient ultimately transferred to [facility] under Dr. Jimenez for surgical evaluation.     Preoperative workup underway.  Follow up labs, UA, PFTs, CXR, b/l carotid US, and repeat TTE.   Continue Heparin gtt for full anticoagulation with PAF.  Continue Lasix 20IV QD for diuresis as tolerated by renal function and SBP.  CTA chest performed at [place]. Patient had radiation to the Left chest and will be a reop.  Surgeon to review imaging.   Plan to be discussed / reviewed with CT Surgery attending / team during AM rounds.

## 2024-01-06 NOTE — H&P ADULT - HISTORY OF PRESENT ILLNESS
68 year old female with a PMHx of left breast cancer (s/p XRT and lumpectomy; 2009), BioMVR with MAZE (Bovine) in 2013 at [facility], pAF (on Eliquis), HTN, originally presented to [facility] with 3 weeks of worsening SOB and STEWART, acute on chronic diastolic CHF exacerbation, with SARINA showing severe prosthetic mitral stenosis and severe TR, s/p LHC with no obstructive CAD noted. Patient ultimately transferred to [facility] under Dr. Jimenez for surgical evaluation.    68 year old female with a PMHx of left breast cancer (s/p XRT and lumpectomy; 2009), BioMVR with MAZE (Bovine) in 2013 at [facility], pAF (on Eliquis), HTN, originally presented to [facility] with 3 weeks of worsening SOB and STEWART, acute on chronic diastolic CHF exacerbation, with SARINA showing severe prosthetic mitral stenosis and severe TR, s/p LHC with no obstructive CAD noted. Patient ultimately transferred to [facility] under Dr. Jimenez for surgical evaluation.

## 2024-01-06 NOTE — CONSULT NOTE ADULT - PROBLEM SELECTOR RECOMMENDATION 4
- according to PT her EF at [place] was 40%, she subsequently underwent LHC which showed non-obs CAD right radial access.   - Echo here shows EF 50-55%. Will need to review [place] records, EF lower in setting of RVR   - needs strict rate control, goal is sustained <110, ideally patient should be in NSR even if temporarily pre-op   - meds as above - according to PT her EF at [place] was 40%, she subsequently underwent LHC which showed non-obs CAD right radial access.   - Echo here shows EF 50-55%. Will need to review [place] records, EF lower in setting of RVR   - needs strict rate control, goal is sustained <110, ideally patient should be in NSR even if temporarily pre-op   - meds as above

## 2024-01-06 NOTE — H&P ADULT - NSHPSOCIALHISTORY_GEN_ALL_CORE
Independent for ADLs. Walks without assistance. Drives self.   Lives alone. Has stairs with a landing in the middle.   Denies any history of smoking, alcohol, or illicit drug use.

## 2024-01-06 NOTE — PHYSICAL THERAPY INITIAL EVALUATION ADULT - OCCUPATION
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

## 2024-01-06 NOTE — H&P ADULT - PROBLEM SELECTOR PLAN 4
H/o Left breast cancer (s/p XRT and lumpectomy; 2009). Elevated TSH of 8 on arrival.   TSH was 5.6 at [place] prior to transfer, with normal T4 and Thyroxine level. Elevated TSH of 8 on arrival.   TSH was 5.6 at [place] prior to transfer, with normal T4 and Thyroxine level.

## 2024-01-07 LAB
ANION GAP SERPL CALC-SCNC: 13 MMOL/L — SIGNIFICANT CHANGE UP (ref 5–17)
ANION GAP SERPL CALC-SCNC: 13 MMOL/L — SIGNIFICANT CHANGE UP (ref 5–17)
APTT BLD: 88.3 SEC — HIGH (ref 24.5–35.6)
APTT BLD: 88.3 SEC — HIGH (ref 24.5–35.6)
BUN SERPL-MCNC: 22.7 MG/DL — HIGH (ref 8–20)
BUN SERPL-MCNC: 22.7 MG/DL — HIGH (ref 8–20)
CALCIUM SERPL-MCNC: 8.7 MG/DL — SIGNIFICANT CHANGE UP (ref 8.4–10.5)
CALCIUM SERPL-MCNC: 8.7 MG/DL — SIGNIFICANT CHANGE UP (ref 8.4–10.5)
CHLORIDE SERPL-SCNC: 105 MMOL/L — SIGNIFICANT CHANGE UP (ref 96–108)
CHLORIDE SERPL-SCNC: 105 MMOL/L — SIGNIFICANT CHANGE UP (ref 96–108)
CO2 SERPL-SCNC: 23 MMOL/L — SIGNIFICANT CHANGE UP (ref 22–29)
CO2 SERPL-SCNC: 23 MMOL/L — SIGNIFICANT CHANGE UP (ref 22–29)
CREAT SERPL-MCNC: 0.87 MG/DL — SIGNIFICANT CHANGE UP (ref 0.5–1.3)
CREAT SERPL-MCNC: 0.87 MG/DL — SIGNIFICANT CHANGE UP (ref 0.5–1.3)
EGFR: 73 ML/MIN/1.73M2 — SIGNIFICANT CHANGE UP
EGFR: 73 ML/MIN/1.73M2 — SIGNIFICANT CHANGE UP
GLUCOSE SERPL-MCNC: 93 MG/DL — SIGNIFICANT CHANGE UP (ref 70–99)
GLUCOSE SERPL-MCNC: 93 MG/DL — SIGNIFICANT CHANGE UP (ref 70–99)
HCT VFR BLD CALC: 33.5 % — LOW (ref 34.5–45)
HCT VFR BLD CALC: 33.5 % — LOW (ref 34.5–45)
HCT VFR BLD CALC: 38.2 % — SIGNIFICANT CHANGE UP (ref 34.5–45)
HCT VFR BLD CALC: 38.2 % — SIGNIFICANT CHANGE UP (ref 34.5–45)
HGB BLD-MCNC: 11.2 G/DL — LOW (ref 11.5–15.5)
HGB BLD-MCNC: 11.2 G/DL — LOW (ref 11.5–15.5)
HGB BLD-MCNC: 12.1 G/DL — SIGNIFICANT CHANGE UP (ref 11.5–15.5)
HGB BLD-MCNC: 12.1 G/DL — SIGNIFICANT CHANGE UP (ref 11.5–15.5)
MAGNESIUM SERPL-MCNC: 1.9 MG/DL — SIGNIFICANT CHANGE UP (ref 1.6–2.6)
MAGNESIUM SERPL-MCNC: 1.9 MG/DL — SIGNIFICANT CHANGE UP (ref 1.6–2.6)
MCHC RBC-ENTMCNC: 31.1 PG — SIGNIFICANT CHANGE UP (ref 27–34)
MCHC RBC-ENTMCNC: 31.1 PG — SIGNIFICANT CHANGE UP (ref 27–34)
MCHC RBC-ENTMCNC: 31.7 GM/DL — LOW (ref 32–36)
MCHC RBC-ENTMCNC: 31.7 GM/DL — LOW (ref 32–36)
MCHC RBC-ENTMCNC: 31.7 PG — SIGNIFICANT CHANGE UP (ref 27–34)
MCHC RBC-ENTMCNC: 31.7 PG — SIGNIFICANT CHANGE UP (ref 27–34)
MCHC RBC-ENTMCNC: 33.4 GM/DL — SIGNIFICANT CHANGE UP (ref 32–36)
MCHC RBC-ENTMCNC: 33.4 GM/DL — SIGNIFICANT CHANGE UP (ref 32–36)
MCV RBC AUTO: 94.9 FL — SIGNIFICANT CHANGE UP (ref 80–100)
MCV RBC AUTO: 94.9 FL — SIGNIFICANT CHANGE UP (ref 80–100)
MCV RBC AUTO: 98.2 FL — SIGNIFICANT CHANGE UP (ref 80–100)
MCV RBC AUTO: 98.2 FL — SIGNIFICANT CHANGE UP (ref 80–100)
NT-PROBNP SERPL-SCNC: 2565 PG/ML — HIGH (ref 0–300)
NT-PROBNP SERPL-SCNC: 2565 PG/ML — HIGH (ref 0–300)
PLATELET # BLD AUTO: 197 K/UL — SIGNIFICANT CHANGE UP (ref 150–400)
PLATELET # BLD AUTO: 197 K/UL — SIGNIFICANT CHANGE UP (ref 150–400)
PLATELET # BLD AUTO: 215 K/UL — SIGNIFICANT CHANGE UP (ref 150–400)
PLATELET # BLD AUTO: 215 K/UL — SIGNIFICANT CHANGE UP (ref 150–400)
POTASSIUM SERPL-MCNC: 4 MMOL/L — SIGNIFICANT CHANGE UP (ref 3.5–5.3)
POTASSIUM SERPL-MCNC: 4 MMOL/L — SIGNIFICANT CHANGE UP (ref 3.5–5.3)
POTASSIUM SERPL-SCNC: 4 MMOL/L — SIGNIFICANT CHANGE UP (ref 3.5–5.3)
POTASSIUM SERPL-SCNC: 4 MMOL/L — SIGNIFICANT CHANGE UP (ref 3.5–5.3)
RBC # BLD: 3.53 M/UL — LOW (ref 3.8–5.2)
RBC # BLD: 3.53 M/UL — LOW (ref 3.8–5.2)
RBC # BLD: 3.89 M/UL — SIGNIFICANT CHANGE UP (ref 3.8–5.2)
RBC # BLD: 3.89 M/UL — SIGNIFICANT CHANGE UP (ref 3.8–5.2)
RBC # FLD: 14.4 % — SIGNIFICANT CHANGE UP (ref 10.3–14.5)
RBC # FLD: 14.4 % — SIGNIFICANT CHANGE UP (ref 10.3–14.5)
RBC # FLD: 14.5 % — SIGNIFICANT CHANGE UP (ref 10.3–14.5)
RBC # FLD: 14.5 % — SIGNIFICANT CHANGE UP (ref 10.3–14.5)
SODIUM SERPL-SCNC: 141 MMOL/L — SIGNIFICANT CHANGE UP (ref 135–145)
SODIUM SERPL-SCNC: 141 MMOL/L — SIGNIFICANT CHANGE UP (ref 135–145)
WBC # BLD: 4.47 K/UL — SIGNIFICANT CHANGE UP (ref 3.8–10.5)
WBC # BLD: 4.47 K/UL — SIGNIFICANT CHANGE UP (ref 3.8–10.5)
WBC # BLD: 5.52 K/UL — SIGNIFICANT CHANGE UP (ref 3.8–10.5)
WBC # BLD: 5.52 K/UL — SIGNIFICANT CHANGE UP (ref 3.8–10.5)
WBC # FLD AUTO: 4.47 K/UL — SIGNIFICANT CHANGE UP (ref 3.8–10.5)
WBC # FLD AUTO: 4.47 K/UL — SIGNIFICANT CHANGE UP (ref 3.8–10.5)
WBC # FLD AUTO: 5.52 K/UL — SIGNIFICANT CHANGE UP (ref 3.8–10.5)
WBC # FLD AUTO: 5.52 K/UL — SIGNIFICANT CHANGE UP (ref 3.8–10.5)

## 2024-01-07 PROCEDURE — 99233 SBSQ HOSP IP/OBS HIGH 50: CPT | Mod: FS

## 2024-01-07 PROCEDURE — 71045 X-RAY EXAM CHEST 1 VIEW: CPT | Mod: 26

## 2024-01-07 PROCEDURE — 99232 SBSQ HOSP IP/OBS MODERATE 35: CPT

## 2024-01-07 RX ORDER — HEPARIN SODIUM 5000 [USP'U]/ML
3000 INJECTION INTRAVENOUS; SUBCUTANEOUS EVERY 6 HOURS
Refills: 0 | Status: DISCONTINUED | OUTPATIENT
Start: 2024-01-07 | End: 2024-01-08

## 2024-01-07 RX ORDER — HEPARIN SODIUM 5000 [USP'U]/ML
900 INJECTION INTRAVENOUS; SUBCUTANEOUS
Qty: 25000 | Refills: 0 | Status: DISCONTINUED | OUTPATIENT
Start: 2024-01-07 | End: 2024-01-08

## 2024-01-07 RX ORDER — HEPARIN SODIUM 5000 [USP'U]/ML
6000 INJECTION INTRAVENOUS; SUBCUTANEOUS EVERY 6 HOURS
Refills: 0 | Status: DISCONTINUED | OUTPATIENT
Start: 2024-01-07 | End: 2024-01-08

## 2024-01-07 RX ORDER — FUROSEMIDE 40 MG
20 TABLET ORAL ONCE
Refills: 0 | Status: COMPLETED | OUTPATIENT
Start: 2024-01-07 | End: 2024-01-07

## 2024-01-07 RX ADMIN — Medication 650 MILLIGRAM(S): at 01:15

## 2024-01-07 RX ADMIN — Medication 50 MILLIGRAM(S): at 05:51

## 2024-01-07 RX ADMIN — SODIUM CHLORIDE 3 MILLILITER(S): 9 INJECTION INTRAMUSCULAR; INTRAVENOUS; SUBCUTANEOUS at 20:49

## 2024-01-07 RX ADMIN — Medication 20 MILLIGRAM(S): at 05:51

## 2024-01-07 RX ADMIN — PANTOPRAZOLE SODIUM 40 MILLIGRAM(S): 20 TABLET, DELAYED RELEASE ORAL at 05:51

## 2024-01-07 RX ADMIN — HEPARIN SODIUM 900 UNIT(S)/HR: 5000 INJECTION INTRAVENOUS; SUBCUTANEOUS at 18:00

## 2024-01-07 RX ADMIN — Medication 50 MILLIGRAM(S): at 23:35

## 2024-01-07 RX ADMIN — HEPARIN SODIUM 900 UNIT(S)/HR: 5000 INJECTION INTRAVENOUS; SUBCUTANEOUS at 19:24

## 2024-01-07 RX ADMIN — Medication 20 MILLIGRAM(S): at 17:22

## 2024-01-07 RX ADMIN — Medication 50 MILLIGRAM(S): at 13:34

## 2024-01-07 RX ADMIN — APIXABAN 5 MILLIGRAM(S): 2.5 TABLET, FILM COATED ORAL at 05:51

## 2024-01-07 RX ADMIN — MUPIROCIN 1 APPLICATION(S): 20 OINTMENT TOPICAL at 05:52

## 2024-01-07 RX ADMIN — MUPIROCIN 1 APPLICATION(S): 20 OINTMENT TOPICAL at 17:22

## 2024-01-07 RX ADMIN — ATORVASTATIN CALCIUM 80 MILLIGRAM(S): 80 TABLET, FILM COATED ORAL at 23:34

## 2024-01-07 RX ADMIN — SODIUM CHLORIDE 3 MILLILITER(S): 9 INJECTION INTRAMUSCULAR; INTRAVENOUS; SUBCUTANEOUS at 05:21

## 2024-01-07 RX ADMIN — SODIUM CHLORIDE 3 MILLILITER(S): 9 INJECTION INTRAMUSCULAR; INTRAVENOUS; SUBCUTANEOUS at 14:38

## 2024-01-07 RX ADMIN — SPIRONOLACTONE 25 MILLIGRAM(S): 25 TABLET, FILM COATED ORAL at 05:51

## 2024-01-07 RX ADMIN — Medication 650 MILLIGRAM(S): at 00:13

## 2024-01-07 NOTE — PROGRESS NOTE ADULT - PROBLEM SELECTOR PLAN 1
Needs optimization before surgical procedures  SARINA/DCCV Monday  Continue Eliquis Q12h Needs optimization before surgical procedures  SARINA/DCCV Monday if rate is fast with ambulation  Continue Eliquis Q12h

## 2024-01-07 NOTE — PROGRESS NOTE ADULT - ASSESSMENT
68 year old female with a PMH of left breast cancer (s/p XRT and lumpectomy; 2009), BioMVR with MAZE (Bovine) in 2013 at [facility], pAF (on Eliquis), HTN, originally presented to [facility] with 3 weeks of worsening SOB and STEWART, acute on chronic diastolic CHF exacerbation, with SARINA showing severe prosthetic mitral stenosis and severe TR, s/p LHC with no obstructive CAD noted. Patient ultimately transferred to [facility] under Dr. Jimenez for surgical evaluation. Cardiology consulted for management of Afib/Flutter   Echo EF 55% La severely dilated, moderate to severe mitral stenosis, Moderate TR RSVP 55mm/gh 68 year old female with a PMH of left breast cancer (s/p XRT and lumpectomy; 2009), BioMVR with MAZE (Bovine) in 2013 at [facility], pAF (on Eliquis), HTN, originally presented to [facility] with 3 weeks of worsening SOB and STEWART, acute on chronic diastolic CHF exacerbation, with SARINA showing severe prosthetic mitral stenosis and severe TR, s/p LHC with no obstructive CAD noted. Patient ultimately transferred to [facility] under Dr. Jimenez for surgical evaluation. Cardiology consulted for management of Afib/Flutter   Echo EF 55% La severely dilated, moderate to severe mitral stenosis, Moderate TR RSVP 55mm/gh

## 2024-01-07 NOTE — PROGRESS NOTE ADULT - SUBJECTIVE AND OBJECTIVE BOX
Severe Bioprosthetic Mitral Stenosis, Moderate TR      PAST MEDICAL & SURGICAL HISTORY:  Afib  Breast cancer left; s/p lumpectomy and XRT  S/P breast lumpectomy, left  H/O prosthetic mitral valve, bioprosthetic bovine     Brief Hospital Course: 68 year old female with a PMHx of left breast cancer (s/p XRT and lumpectomy; ), BioMVR with MAZE (Bovine) in  at [facility], pAF (on Eliquis), HTN, originally presented to [facility] with 3 weeks of worsening SOB and STEWART, acute on chronic diastolic CHF exacerbation, with SARINA showing severe prosthetic mitral stenosis and severe TR, s/p LHC with no obstructive CAD noted. Patient ultimately transferred to [facility] under Dr. Jimenez for surgical evaluation.       Significant recent/past 24 hr events: No overnight events reported.    Subjective: Patient lying in bed in NAD. +Tolerating diet. +Passing gas. +Pain currently controlled. Denies fevers, chills, lightheadedness, dizziness, HA, CP, palpitations, SOB, cough, abdominal pain, N/V, diarrhea, numbness/tingling in extremities, or any other acute complaints. ROS negative x 10 systems except as noted above.    MEDICATIONS  (STANDING):  apixaban 5 milliGRAM(s) Oral every 12 hours  atorvastatin 80 milliGRAM(s) Oral at bedtime  furosemide    Tablet 20 milliGRAM(s) Oral daily  metoprolol tartrate 50 milliGRAM(s) Oral every 8 hours  mupirocin 2% Ointment 1 Application(s) Both Nostrils every 12 hours  pantoprazole    Tablet 40 milliGRAM(s) Oral before breakfast  sodium chloride 0.9% lock flush 3 milliLiter(s) IV Push every 8 hours  spironolactone 25 milliGRAM(s) Oral daily    MEDICATIONS  (PRN):  acetaminophen     Tablet .. 650 milliGRAM(s) Oral every 6 hours PRN Mild Pain (1 - 3)    Allergies: No Known Allergies    Vitals   T(C): 36.4 (2024 20:50), Max: 36.5 (2024 08:29)  T(F): 97.5 (2024 20:50), Max: 97.7 (2024 08:29)  HR: 82 (2024 20:50) (74 - 109)  BP: 126/76 (2024 20:50) (96/65 - 134/84)  RR: 20 (2024 20:50) (18 - 20)  SpO2: 97% (2024 20:50) (95% - 97%)  O2 Parameters below as of 2024 20:50  Patient On (Oxygen Delivery Method): nasal cannula  O2 Flow (L/min): 2    I&O's Detail    2024 07:01  -  2024 07:00  --------------------------------------------------------  IN:    Heparin Infusion: 81 mL    Oral Fluid: 240 mL  Total IN: 321 mL    OUT:  Total OUT: 0 mL    Total NET: 321 mL      2024 07:01  -  2024 03:20  --------------------------------------------------------  IN:    Heparin Infusion: 27 mL    Oral Fluid: 120 mL  Total IN: 147 mL    OUT:    Voided (mL): 450 mL  Total OUT: 450 mL    Total NET: -303 mL    Physical Exam  General: Sitting up in bed in NAD  Neuro: A+O x 3, non-focal, speech clear and intact  HEENT:  NCAT, No conjuctival edema or icterus, no thrush.   Neck: Supple, trachea midline  Pulm: +Diminished BSs at bases bilaterally R>L, no rales/rhonchi/wheezing, no accessory muscle use noted  CV: regular rate, irregularly irregular rhythm, +S1S2, +murmur   Abd: soft, NT, ND, + BS  Ext: CHAPMAN x 4, trace edema, no cyanosis, distal motor/neuro/circ intact  Skin: warm, dry, perfused    LABS                        11.9   6.78  )-----------(       ( 2024 04:35 )             35.3     01-05    139  |  100  |  27.9<H>  ----------------------------<  96  4.2   |  26.0  |  1.28    Ca    9.2      2024 21:15  Mg     2.0         TPro  6.5<L>  /  Alb  3.9  /  TBili  0.6  /  DBili  x   /  AST  24  /  ALT  23  /  AlkPhos  89      PT/INR - ( 2024 21:15 )   PT: 12.5 sec;   INR: 1.13 ratio       PTT - ( 2024 12:50 )  PTT:78.4 sec    P2Y12 Plt Response Test (24 @ 21:15)    P2Y12 Plt Reactivity: 244    A1C with Estimated Average Glucose (24 @ 21:15)    A1C with Estimated Average Glucose Result: 5.6 %    Estimated Average Glucose: 114 mg/dL    Thyroid Stimulating Hormone, Serum (24 @ 21:15)    Thyroid Stimulating Hormone, Serum: 8.01 uIU/mL    Prealbumin, Serum (24 @ 21:15)    Prealbumin, Serum: 20 mg/dL    Pro-Brain Natriuretic Peptide (24 @ 21:15)    Pro-Brain Natriuretic Peptide: 2806    Urinalysis Basic - ( 2024 12:12 )  Color: Yellow / Appearance: Clear / S.018 / pH: x  Gluc: x / Ketone: Negative mg/dL  / Bili: Negative / Urobili: 1.0 mg/dL   Blood: x / Protein: Negative mg/dL / Nitrite: Negative   Leuk Esterase: Negative / RBC: x / WBC x   Sq Epi: x / Non Sq Epi: x / Bacteria: x    Last CXR:  < from: Xray Chest 1 View AP/PA. (24 @ 21:24) >  IMPRESSION:  HEART:  Enlarged.  Prosthetic mitral valve.  LUNGS: There are bibasilar opacities compatible with effusion/infiltrate.   Mild congestive changes.. Mild congestive heart failure, similar to prior.  BONES: sternotomy wires  < end of copied text >    TTE:  < from: TTE W or WO Ultrasound Enhancing Agent (24 @ 12:12) >  CONCLUSIONS:   1. Left ventricular cavity is normal. Left ventricular systolic function is low normal with an ejection fraction of 55 % by Simmons's method of disks with an ejection fraction visually estimated at 50 to 55 %.   2. Mild left ventricular hypertrophy.   3. Normal right ventricular cavity size.   4. The left atrium is severely dilated.   5. The right atrium is mildly dilated in size.   6. Aortic root at the sinuses of Valsalva is normal in size, measuring 3.00 cm (indexed 1.61 cm/m²).   7. Moderate to severe mitral valve stenosis.   8. Moderate tricuspid regurgitation.   9. Estimated pulmonary artery systolic pressure is 55 mmHg, consistent with severe pulmonary hypertension.  10. No pericardial effusion seen.  11. Large left pleural effusion noted.  < end of copied text >    B/l Carotid:  < from: US Duplex Carotid Arteries Complete, Bilateral (24 @ 12:22) >  Antegrade flow is noted within both vertebral arteries.  IMPRESSION: No significant hemodynamic stenosis of either carotid artery.  < end of copied text >   Severe Bioprosthetic Mitral Stenosis, Moderate TR      PAST MEDICAL & SURGICAL HISTORY:  Afib  Breast cancer left; s/p lumpectomy and XRT  S/P breast lumpectomy, left  H/O prosthetic mitral valve, bioprosthetic bovine     Brief Hospital Course: 68 year old female with a PMHx of left breast cancer (s/p XRT and lumpectomy; ), BioMVR with MAZE (Bovine) in  at [facility], pAF (on Eliquis), HTN, originally presented to [facility] with 3 weeks of worsening SOB and STEWART, acute on chronic diastolic CHF exacerbation, with SARINA showing severe prosthetic mitral stenosis and severe TR, s/p LHC with no obstructive CAD noted. Patient ultimately transferred to [facility] under Dr. Jimenez for surgical evaluation.       Significant recent/past 24 hr events: No overnight events reported.    Subjective: Patient lying in bed in NAD. +Tolerating diet. +Passing gas. +Pain currently controlled. Denies fevers, chills, lightheadedness, dizziness, HA, CP, palpitations, SOB, cough, abdominal pain, N/V, diarrhea, numbness/tingling in extremities, or any other acute complaints. ROS negative x 10 systems except as noted above.    MEDICATIONS  (STANDING):  apixaban 5 milliGRAM(s) Oral every 12 hours  atorvastatin 80 milliGRAM(s) Oral at bedtime  furosemide    Tablet 20 milliGRAM(s) Oral daily  metoprolol tartrate 50 milliGRAM(s) Oral every 8 hours  mupirocin 2% Ointment 1 Application(s) Both Nostrils every 12 hours  pantoprazole    Tablet 40 milliGRAM(s) Oral before breakfast  sodium chloride 0.9% lock flush 3 milliLiter(s) IV Push every 8 hours  spironolactone 25 milliGRAM(s) Oral daily    MEDICATIONS  (PRN):  acetaminophen     Tablet .. 650 milliGRAM(s) Oral every 6 hours PRN Mild Pain (1 - 3)    Allergies: No Known Allergies    Vitals   T(C): 36.4 (2024 20:50), Max: 36.5 (2024 08:29)  T(F): 97.5 (2024 20:50), Max: 97.7 (2024 08:29)  HR: 82 (2024 20:50) (74 - 109)  BP: 126/76 (2024 20:50) (96/65 - 134/84)  RR: 20 (2024 20:50) (18 - 20)  SpO2: 97% (2024 20:50) (95% - 97%)  O2 Parameters below as of 2024 20:50  Patient On (Oxygen Delivery Method): nasal cannula  O2 Flow (L/min): 2    I&O's Detail    2024 07:01  -  2024 07:00  --------------------------------------------------------  IN:    Heparin Infusion: 81 mL    Oral Fluid: 240 mL  Total IN: 321 mL    OUT:  Total OUT: 0 mL    Total NET: 321 mL      2024 07:01  -  2024 03:20  --------------------------------------------------------  IN:    Heparin Infusion: 27 mL    Oral Fluid: 120 mL  Total IN: 147 mL    OUT:    Voided (mL): 450 mL  Total OUT: 450 mL    Total NET: -303 mL    Physical Exam  General: Sitting up in bed in NAD  Neuro: A+O x 3, non-focal, speech clear and intact  HEENT:  NCAT, No conjuctival edema or icterus, no thrush.   Neck: Supple, trachea midline  Pulm: +Diminished BSs at bases bilaterally R>L, no rales/rhonchi/wheezing, no accessory muscle use noted  CV: regular rate, irregularly irregular rhythm, +S1S2, +murmur   Abd: soft, NT, ND, + BS  Ext: CHAPMAN x 4, trace edema, no cyanosis, distal motor/neuro/circ intact  Skin: warm, dry, perfused    LABS                        11.9   6.78  )-----------(       ( 2024 04:35 )             35.3     01-05    139  |  100  |  27.9<H>  ----------------------------<  96  4.2   |  26.0  |  1.28    Ca    9.2      2024 21:15  Mg     2.0         TPro  6.5<L>  /  Alb  3.9  /  TBili  0.6  /  DBili  x   /  AST  24  /  ALT  23  /  AlkPhos  89      PT/INR - ( 2024 21:15 )   PT: 12.5 sec;   INR: 1.13 ratio       PTT - ( 2024 12:50 )  PTT:78.4 sec    P2Y12 Plt Response Test (24 @ 21:15)    P2Y12 Plt Reactivity: 244    A1C with Estimated Average Glucose (24 @ 21:15)    A1C with Estimated Average Glucose Result: 5.6 %    Estimated Average Glucose: 114 mg/dL    Thyroid Stimulating Hormone, Serum (24 @ 21:15)    Thyroid Stimulating Hormone, Serum: 8.01 uIU/mL    Prealbumin, Serum (24 @ 21:15)    Prealbumin, Serum: 20 mg/dL    Pro-Brain Natriuretic Peptide (24 @ 21:15)    Pro-Brain Natriuretic Peptide: 2806    Urinalysis Basic - ( 2024 12:12 )  Color: Yellow / Appearance: Clear / S.018 / pH: x  Gluc: x / Ketone: Negative mg/dL  / Bili: Negative / Urobili: 1.0 mg/dL   Blood: x / Protein: Negative mg/dL / Nitrite: Negative   Leuk Esterase: Negative / RBC: x / WBC x   Sq Epi: x / Non Sq Epi: x / Bacteria: x    Last CXR:  < from: Xray Chest 1 View AP/PA. (24 @ 21:24) >  IMPRESSION:  HEART:  Enlarged.  Prosthetic mitral valve.  LUNGS: There are bibasilar opacities compatible with effusion/infiltrate.   Mild congestive changes.. Mild congestive heart failure, similar to prior.  BONES: sternotomy wires  < end of copied text >    TTE:  < from: TTE W or WO Ultrasound Enhancing Agent (24 @ 12:12) >  CONCLUSIONS:   1. Left ventricular cavity is normal. Left ventricular systolic function is low normal with an ejection fraction of 55 % by Simmons's method of disks with an ejection fraction visually estimated at 50 to 55 %.   2. Mild left ventricular hypertrophy.   3. Normal right ventricular cavity size.   4. The left atrium is severely dilated.   5. The right atrium is mildly dilated in size.   6. Aortic root at the sinuses of Valsalva is normal in size, measuring 3.00 cm (indexed 1.61 cm/m²).   7. Moderate to severe mitral valve stenosis.   8. Moderate tricuspid regurgitation.   9. Estimated pulmonary artery systolic pressure is 55 mmHg, consistent with severe pulmonary hypertension.  10. No pericardial effusion seen.  11. Large left pleural effusion noted.  < end of copied text >    B/l Carotid:  < from: US Duplex Carotid Arteries Complete, Bilateral (24 @ 12:22) >  Antegrade flow is noted within both vertebral arteries.  IMPRESSION: No significant hemodynamic stenosis of either carotid artery.  < end of copied text >

## 2024-01-07 NOTE — PROGRESS NOTE ADULT - PROBLEM SELECTOR PLAN 3
H/o MAZE procedure in 2013, with recurrent PAF since.   Patient takes Eliquis 5BID as an outpatient.   Eliquis restarted for PAF, currently remains Aflutter HR 80-100s.   Continue Lopressor as tolerated by HR and BP for HR control.

## 2024-01-07 NOTE — PROGRESS NOTE ADULT - ASSESSMENT
68 year old female with a PMHx of left breast cancer (s/p XRT and lumpectomy; 2009), BioMVR with MAZE (Bovine) in 2013 at [facility], pAF (on Eliquis), HTN, originally presented to [facility] with 3 weeks of worsening SOB and STEWART, acute on chronic diastolic CHF exacerbation, with SARINA showing severe prosthetic mitral stenosis and severe TR, s/p LHC with no obstructive CAD noted. Patient ultimately transferred to [facility] under Dr. Jimenez for surgical evaluation.    68 year old female with a PMHx of left breast cancer (s/p XRT and lumpectomy; 2009), BioMVR with MAZE (Bovine) in 2013 at [facility], pAF (on Eliquis), HTN, originally presented to [facility] with 3 weeks of worsening SOB and STEWART, acute on chronic diastolic CHF exacerbation, with SARINA showing severe prosthetic mitral stenosis and severe TR, s/p LHC with no obstructive CAD noted. Patient ultimately transferred to [facility] under Dr. Jimenez for surgical evaluation.

## 2024-01-07 NOTE — PROGRESS NOTE ADULT - PROBLEM SELECTOR PLAN 3
For José Miguel and cardioversion tomorrow  c/w Metoprolol and ELIQUIS      Cardiac meds  apixaban 5 milliGRAM(s) Oral every 12 hours  atorvastatin 80 milliGRAM(s) Oral at bedtime  furosemide    Tablet 20 milliGRAM(s) Oral daily  metoprolol tartrate 50 milliGRAM(s) Oral every 8 hours  spironolactone 25 milliGRAM(s) Oral daily For José Miguel and cardioversion tomorrow if rate is fast with ambulation  c/w Metoprolol and ELIQUIS      Cardiac meds  apixaban 5 milliGRAM(s) Oral every 12 hours  atorvastatin 80 milliGRAM(s) Oral at bedtime  furosemide    Tablet 20 milliGRAM(s) Oral daily  metoprolol tartrate 50 milliGRAM(s) Oral every 8 hours  spironolactone 25 milliGRAM(s) Oral daily

## 2024-01-07 NOTE — PROGRESS NOTE ADULT - SUBJECTIVE AND OBJECTIVE BOX
[facility]                                                         CARDIOLOGY AT [facility] [place]                                                                  [address], [place] [address]                                                         Telephone: [phone]. Fax:[phone]                                                                             PROGRESS NOTE    Reason for follow up: CHF, Mitral Stenosis, CAD  Update: Patient feels sob this am more sob then she had been  No increase in edema   IN: 267 mL / OUT: 1450 mL / NET: -1183 mL      Review of symptoms:   Cardiac:  No chest pain. +  dyspnea. No palpitations.  Respiratory: no cough. No dyspnea  Gastrointestinal: No diarrhea. No abdominal pain. No bleeding.   Neuro: No focal neuro complaints.    Vitals:  T(C): 36.7 (01-07-24 @ 04:54), Max: 36.7 (01-07-24 @ 04:54)  HR: 66 (01-07-24 @ 04:54) (66 - 109)  BP: 109/68 (01-07-24 @ 04:54) (96/65 - 129/68)  RR: 19 (01-07-24 @ 04:54) (18 - 20)  SpO2: 95% (01-07-24 @ 04:54) (95% - 97%)  Wt(kg): --  I&O's Summary    06 Jan 2024 07:01  -  07 Jan 2024 07:00  --------------------------------------------------------  IN: 267 mL / OUT: 1450 mL / NET: -1183 mL  Weight (kg): 72.7 (01-05 @ 21:23)      PHYSICAL EXAM:  Appearance: Comfortable. No acute distress  HEENT:  Atraumatic. Normocephalic.  Normal oral mucosa  Neurologic: A & O x 3, no gross focal deficits.  Cardiovascular: R s1&s2 irregular 3/6 neftali  Respiratory: Lungs clear to auscultation, unlabored   Gastrointestinal:  Soft, Non-tender, + BS  Lower Extremities: No edema  Psychiatry: Patient is calm. No agitation.   Skin: warm and dry.      CURRENT MEDICATIONS:  MEDICATIONS  (STANDING):  apixaban 5 milliGRAM(s) Oral every 12 hours  atorvastatin 80 milliGRAM(s) Oral at bedtime  furosemide    Tablet 20 milliGRAM(s) Oral daily  metoprolol tartrate 50 milliGRAM(s) Oral every 8 hours  mupirocin 2% Ointment 1 Application(s) Both Nostrils every 12 hours  pantoprazole    Tablet 40 milliGRAM(s) Oral before breakfast  sodium chloride 0.9% lock flush 3 milliLiter(s) IV Push every 8 hours  spironolactone 25 milliGRAM(s) Oral daily      LABS:	 	  CARDIAC MARKERS ( 05 Jan 2024 21:15 )  x     / x     / 39 U/L / x     / x      p-BNP 05 Jan 2024 21:15: x                              11.2   4.47  )-----------( 197      ( 07 Jan 2024 04:47 )             33.5     01-07    141  |  105  |  22.7<H>  ----------------------------<  93  4.0   |  23.0  |  0.87    Ca    8.7      07 Jan 2024 04:47  Mg     1.9     01-07    TPro  6.5<L>  /  Alb  3.9  /  TBili  0.6  /  DBili  x   /  AST  24  /  ALT  23  /  AlkPhos  89  01-05    proBNP:   Lipid Profile:   HgA1c:   TSH: Thyroid Stimulating Hormone, Serum: 8.01 uIU/mL    TELEMETRY: Atrial flutter mostly rate controlled  ECG:  	      DIAGNOSTIC TESTING:  [ ] Echocardiogram: < from: TTE W or WO Ultrasound Enhancing Agent (01.06.24 @ 12:12) >   1. Left ventricular cavity is normal. Left ventricular systolic function is low normal with an ejection fraction of 55 % by Simmons's method of disks with an ejection fraction visually estimated at 50 to 55 %.   2. Mild left ventricular hypertrophy.   3. Normal right ventricular cavity size.   4. The left atrium is severely dilated.   5. The right atrium is mildly dilated in size.   6. Aortic root at the sinuses of Valsalva is normal in size, measuring 3.00 cm (indexed 1.61 cm/m²).   7. Moderate to severe mitral valve stenosis.   8. Moderate tricuspid regurgitation.   9. Estimated pulmonary artery systolic pressure is 55 mmHg, consistent with severe pulmonary hypertension.  10. No pericardial effusion seen.  11. Large left pleural effusion noted.                                                                  [facility]                                                         CARDIOLOGY AT [facility]                                                                  [address], [place] [address]                                                         Telephone: [phone]. Fax:[phone]                                                                             PROGRESS NOTE    Reason for follow up: CHF, Mitral Stenosis, CAD  Update: Patient feels sob this am more sob then she had been  No increase in edema   IN: 267 mL / OUT: 1450 mL / NET: -1183 mL      Review of symptoms:   Cardiac:  No chest pain. +  dyspnea. No palpitations.  Respiratory: no cough. No dyspnea  Gastrointestinal: No diarrhea. No abdominal pain. No bleeding.   Neuro: No focal neuro complaints.    Vitals:  T(C): 36.7 (01-07-24 @ 04:54), Max: 36.7 (01-07-24 @ 04:54)  HR: 66 (01-07-24 @ 04:54) (66 - 109)  BP: 109/68 (01-07-24 @ 04:54) (96/65 - 129/68)  RR: 19 (01-07-24 @ 04:54) (18 - 20)  SpO2: 95% (01-07-24 @ 04:54) (95% - 97%)  Wt(kg): --  I&O's Summary    06 Jan 2024 07:01  -  07 Jan 2024 07:00  --------------------------------------------------------  IN: 267 mL / OUT: 1450 mL / NET: -1183 mL  Weight (kg): 72.7 (01-05 @ 21:23)      PHYSICAL EXAM:  Appearance: Comfortable. No acute distress  HEENT:  Atraumatic. Normocephalic.  Normal oral mucosa  Neurologic: A & O x 3, no gross focal deficits.  Cardiovascular: R s1&s2 irregular 3/6 neftali  Respiratory: Lungs clear to auscultation, unlabored   Gastrointestinal:  Soft, Non-tender, + BS  Lower Extremities: No edema  Psychiatry: Patient is calm. No agitation.   Skin: warm and dry.      CURRENT MEDICATIONS:  MEDICATIONS  (STANDING):  apixaban 5 milliGRAM(s) Oral every 12 hours  atorvastatin 80 milliGRAM(s) Oral at bedtime  furosemide    Tablet 20 milliGRAM(s) Oral daily  metoprolol tartrate 50 milliGRAM(s) Oral every 8 hours  mupirocin 2% Ointment 1 Application(s) Both Nostrils every 12 hours  pantoprazole    Tablet 40 milliGRAM(s) Oral before breakfast  sodium chloride 0.9% lock flush 3 milliLiter(s) IV Push every 8 hours  spironolactone 25 milliGRAM(s) Oral daily      LABS:	 	  CARDIAC MARKERS ( 05 Jan 2024 21:15 )  x     / x     / 39 U/L / x     / x      p-BNP 05 Jan 2024 21:15: x                              11.2   4.47  )-----------( 197      ( 07 Jan 2024 04:47 )             33.5     01-07    141  |  105  |  22.7<H>  ----------------------------<  93  4.0   |  23.0  |  0.87    Ca    8.7      07 Jan 2024 04:47  Mg     1.9     01-07    TPro  6.5<L>  /  Alb  3.9  /  TBili  0.6  /  DBili  x   /  AST  24  /  ALT  23  /  AlkPhos  89  01-05    proBNP:   Lipid Profile:   HgA1c:   TSH: Thyroid Stimulating Hormone, Serum: 8.01 uIU/mL    TELEMETRY: Atrial flutter mostly rate controlled  ECG:  	      DIAGNOSTIC TESTING:  [ ] Echocardiogram: < from: TTE W or WO Ultrasound Enhancing Agent (01.06.24 @ 12:12) >   1. Left ventricular cavity is normal. Left ventricular systolic function is low normal with an ejection fraction of 55 % by Simmons's method of disks with an ejection fraction visually estimated at 50 to 55 %.   2. Mild left ventricular hypertrophy.   3. Normal right ventricular cavity size.   4. The left atrium is severely dilated.   5. The right atrium is mildly dilated in size.   6. Aortic root at the sinuses of Valsalva is normal in size, measuring 3.00 cm (indexed 1.61 cm/m²).   7. Moderate to severe mitral valve stenosis.   8. Moderate tricuspid regurgitation.   9. Estimated pulmonary artery systolic pressure is 55 mmHg, consistent with severe pulmonary hypertension.  10. No pericardial effusion seen.  11. Large left pleural effusion noted.

## 2024-01-07 NOTE — PROGRESS NOTE ADULT - NS ATTEND AMEND GEN_ALL_CORE FT
prosthtic mitral stenosis atrial flutter with rvr.  rate better controlled. left pleural effusion. consider US left chest if significant pleural effusion consider thoracocenstisus.   will make patient ambulate and see if breathing improved and heart rate controlled on exertion.  if she remains tachycardic on minimal exertion. then consider SARINA and cardioversion. nothing per orally after midnight. SARINA and cardioversion tomorrow 2 pm.    if significnat pleueral effusion, drain tomorrow am and reevalaute dyspnea on exertion to re-evalaute for cardioversion,. if despite drainage, and despite rate control and cardioversion. if patient still NYHA class IV,  and dyspena on minimal exertion, then discussion with CT surgery for inpatient surgery .

## 2024-01-07 NOTE — PROGRESS NOTE ADULT - PROBLEM SELECTOR PLAN 4
Elevated TSH of 8 on arrival.   TSH was 5.6 at [place] prior to transfer, with normal T4 and Thyroxine level. Elevated TSH of 8 on arrival.   TSH was 5.6 at [place] prior to transfer, with normal T4 and Thyroxine level.

## 2024-01-07 NOTE — PROGRESS NOTE ADULT - PROBLEM SELECTOR PLAN 1
Originally presented to [facility] with 3 weeks of worsening SOB and STEWART, acute on chronic diastolic CHF exacerbation, with SARINA showing Severe Prosthetic Mitral Stenosis, Moderate MR, and Severe TR.   S/p LHC with no obstructive CAD noted.   Patient ultimately transferred to [facility] under Dr. Jimenez for surgical evaluation.     Preoperative workup underway.  Labs, UA, CXR, b/l carotid US, and repeat TTE as above.   Eliquis restarted for PAF, currently remains Aflutter HR 80-100s.   Potential plan for SARINA/DCCV tomorrow if still in afib RVR/aflutter.  Continue Lasix 20PO QD and Aldactone for diuresis as tolerated by renal function and SBP.  CTA chest performed at [place]. Patient had radiation to the Left chest and will be a reop.    Plan to be discussed / reviewed with CT Surgery attending / team during AM rounds. Originally presented to [facility] with 3 weeks of worsening SOB and STEWART, acute on chronic diastolic CHF exacerbation, with SARINA showing Severe Prosthetic Mitral Stenosis, Moderate MR, and Severe TR.   S/p LHC with no obstructive CAD noted.   Patient ultimately transferred to [facility] under Dr. Jimenez for surgical evaluation.     Preoperative workup underway.  Labs, UA, CXR, b/l carotid US, and repeat TTE as above.   Eliquis restarted for PAF, currently remains Aflutter HR 80-100s.   Potential plan for SARINA/DCCV tomorrow if still in afib RVR/aflutter.  Continue Lasix 20PO QD and Aldactone for diuresis as tolerated by renal function and SBP.  CTA chest performed at [place]. Patient had radiation to the Left chest and will be a reop.    Plan to be discussed / reviewed with CT Surgery attending / team during AM rounds.

## 2024-01-08 ENCOUNTER — TRANSCRIPTION ENCOUNTER (OUTPATIENT)
Age: 69
End: 2024-01-08

## 2024-01-08 LAB
ANION GAP SERPL CALC-SCNC: 15 MMOL/L — SIGNIFICANT CHANGE UP (ref 5–17)
ANION GAP SERPL CALC-SCNC: 15 MMOL/L — SIGNIFICANT CHANGE UP (ref 5–17)
APTT BLD: 58.4 SEC — HIGH (ref 24.5–35.6)
APTT BLD: 58.4 SEC — HIGH (ref 24.5–35.6)
APTT BLD: 82.8 SEC — HIGH (ref 24.5–35.6)
APTT BLD: 82.8 SEC — HIGH (ref 24.5–35.6)
APTT BLD: 88.5 SEC — HIGH (ref 24.5–35.6)
APTT BLD: 88.5 SEC — HIGH (ref 24.5–35.6)
BUN SERPL-MCNC: 23.6 MG/DL — HIGH (ref 8–20)
BUN SERPL-MCNC: 23.6 MG/DL — HIGH (ref 8–20)
CALCIUM SERPL-MCNC: 8.9 MG/DL — SIGNIFICANT CHANGE UP (ref 8.4–10.5)
CALCIUM SERPL-MCNC: 8.9 MG/DL — SIGNIFICANT CHANGE UP (ref 8.4–10.5)
CHLORIDE SERPL-SCNC: 101 MMOL/L — SIGNIFICANT CHANGE UP (ref 96–108)
CHLORIDE SERPL-SCNC: 101 MMOL/L — SIGNIFICANT CHANGE UP (ref 96–108)
CO2 SERPL-SCNC: 23 MMOL/L — SIGNIFICANT CHANGE UP (ref 22–29)
CO2 SERPL-SCNC: 23 MMOL/L — SIGNIFICANT CHANGE UP (ref 22–29)
CREAT SERPL-MCNC: 0.9 MG/DL — SIGNIFICANT CHANGE UP (ref 0.5–1.3)
CREAT SERPL-MCNC: 0.9 MG/DL — SIGNIFICANT CHANGE UP (ref 0.5–1.3)
EGFR: 71 ML/MIN/1.73M2 — SIGNIFICANT CHANGE UP
EGFR: 71 ML/MIN/1.73M2 — SIGNIFICANT CHANGE UP
GLUCOSE SERPL-MCNC: 89 MG/DL — SIGNIFICANT CHANGE UP (ref 70–99)
GLUCOSE SERPL-MCNC: 89 MG/DL — SIGNIFICANT CHANGE UP (ref 70–99)
HCT VFR BLD CALC: 34.1 % — LOW (ref 34.5–45)
HCT VFR BLD CALC: 34.1 % — LOW (ref 34.5–45)
HGB BLD-MCNC: 11.6 G/DL — SIGNIFICANT CHANGE UP (ref 11.5–15.5)
HGB BLD-MCNC: 11.6 G/DL — SIGNIFICANT CHANGE UP (ref 11.5–15.5)
INR BLD: 1.14 RATIO — SIGNIFICANT CHANGE UP (ref 0.85–1.18)
INR BLD: 1.14 RATIO — SIGNIFICANT CHANGE UP (ref 0.85–1.18)
MAGNESIUM SERPL-MCNC: 1.9 MG/DL — SIGNIFICANT CHANGE UP (ref 1.6–2.6)
MAGNESIUM SERPL-MCNC: 1.9 MG/DL — SIGNIFICANT CHANGE UP (ref 1.6–2.6)
MCHC RBC-ENTMCNC: 32 PG — SIGNIFICANT CHANGE UP (ref 27–34)
MCHC RBC-ENTMCNC: 32 PG — SIGNIFICANT CHANGE UP (ref 27–34)
MCHC RBC-ENTMCNC: 34 GM/DL — SIGNIFICANT CHANGE UP (ref 32–36)
MCHC RBC-ENTMCNC: 34 GM/DL — SIGNIFICANT CHANGE UP (ref 32–36)
MCV RBC AUTO: 94.2 FL — SIGNIFICANT CHANGE UP (ref 80–100)
MCV RBC AUTO: 94.2 FL — SIGNIFICANT CHANGE UP (ref 80–100)
PLATELET # BLD AUTO: 192 K/UL — SIGNIFICANT CHANGE UP (ref 150–400)
PLATELET # BLD AUTO: 192 K/UL — SIGNIFICANT CHANGE UP (ref 150–400)
POTASSIUM SERPL-MCNC: 3.9 MMOL/L — SIGNIFICANT CHANGE UP (ref 3.5–5.3)
POTASSIUM SERPL-MCNC: 3.9 MMOL/L — SIGNIFICANT CHANGE UP (ref 3.5–5.3)
POTASSIUM SERPL-SCNC: 3.9 MMOL/L — SIGNIFICANT CHANGE UP (ref 3.5–5.3)
POTASSIUM SERPL-SCNC: 3.9 MMOL/L — SIGNIFICANT CHANGE UP (ref 3.5–5.3)
PROTHROM AB SERPL-ACNC: 12.6 SEC — SIGNIFICANT CHANGE UP (ref 9.5–13)
PROTHROM AB SERPL-ACNC: 12.6 SEC — SIGNIFICANT CHANGE UP (ref 9.5–13)
RBC # BLD: 3.62 M/UL — LOW (ref 3.8–5.2)
RBC # BLD: 3.62 M/UL — LOW (ref 3.8–5.2)
RBC # FLD: 14.1 % — SIGNIFICANT CHANGE UP (ref 10.3–14.5)
RBC # FLD: 14.1 % — SIGNIFICANT CHANGE UP (ref 10.3–14.5)
SODIUM SERPL-SCNC: 139 MMOL/L — SIGNIFICANT CHANGE UP (ref 135–145)
SODIUM SERPL-SCNC: 139 MMOL/L — SIGNIFICANT CHANGE UP (ref 135–145)
WBC # BLD: 5.22 K/UL — SIGNIFICANT CHANGE UP (ref 3.8–10.5)
WBC # BLD: 5.22 K/UL — SIGNIFICANT CHANGE UP (ref 3.8–10.5)
WBC # FLD AUTO: 5.22 K/UL — SIGNIFICANT CHANGE UP (ref 3.8–10.5)
WBC # FLD AUTO: 5.22 K/UL — SIGNIFICANT CHANGE UP (ref 3.8–10.5)

## 2024-01-08 PROCEDURE — 99232 SBSQ HOSP IP/OBS MODERATE 35: CPT

## 2024-01-08 PROCEDURE — 93320 DOPPLER ECHO COMPLETE: CPT | Mod: 26

## 2024-01-08 PROCEDURE — 93312 ECHO TRANSESOPHAGEAL: CPT | Mod: 26

## 2024-01-08 PROCEDURE — 99231 SBSQ HOSP IP/OBS SF/LOW 25: CPT

## 2024-01-08 PROCEDURE — 93010 ELECTROCARDIOGRAM REPORT: CPT

## 2024-01-08 PROCEDURE — 93325 DOPPLER ECHO COLOR FLOW MAPG: CPT | Mod: 26

## 2024-01-08 RX ORDER — APIXABAN 2.5 MG/1
5 TABLET, FILM COATED ORAL
Refills: 0 | Status: DISCONTINUED | OUTPATIENT
Start: 2024-01-08 | End: 2024-01-08

## 2024-01-08 RX ORDER — APIXABAN 2.5 MG/1
5 TABLET, FILM COATED ORAL
Refills: 0 | Status: DISCONTINUED | OUTPATIENT
Start: 2024-01-08 | End: 2024-01-09

## 2024-01-08 RX ORDER — FUROSEMIDE 40 MG
20 TABLET ORAL ONCE
Refills: 0 | Status: COMPLETED | OUTPATIENT
Start: 2024-01-08 | End: 2024-01-08

## 2024-01-08 RX ADMIN — Medication 50 MILLIGRAM(S): at 21:38

## 2024-01-08 RX ADMIN — SPIRONOLACTONE 25 MILLIGRAM(S): 25 TABLET, FILM COATED ORAL at 05:37

## 2024-01-08 RX ADMIN — HEPARIN SODIUM 900 UNIT(S)/HR: 5000 INJECTION INTRAVENOUS; SUBCUTANEOUS at 00:08

## 2024-01-08 RX ADMIN — APIXABAN 5 MILLIGRAM(S): 2.5 TABLET, FILM COATED ORAL at 15:04

## 2024-01-08 RX ADMIN — HEPARIN SODIUM 900 UNIT(S)/HR: 5000 INJECTION INTRAVENOUS; SUBCUTANEOUS at 10:54

## 2024-01-08 RX ADMIN — ATORVASTATIN CALCIUM 80 MILLIGRAM(S): 80 TABLET, FILM COATED ORAL at 21:38

## 2024-01-08 RX ADMIN — SODIUM CHLORIDE 3 MILLILITER(S): 9 INJECTION INTRAMUSCULAR; INTRAVENOUS; SUBCUTANEOUS at 05:07

## 2024-01-08 RX ADMIN — HEPARIN SODIUM 900 UNIT(S)/HR: 5000 INJECTION INTRAVENOUS; SUBCUTANEOUS at 14:35

## 2024-01-08 RX ADMIN — Medication 20 MILLIGRAM(S): at 10:53

## 2024-01-08 RX ADMIN — MUPIROCIN 1 APPLICATION(S): 20 OINTMENT TOPICAL at 17:57

## 2024-01-08 RX ADMIN — SODIUM CHLORIDE 3 MILLILITER(S): 9 INJECTION INTRAMUSCULAR; INTRAVENOUS; SUBCUTANEOUS at 13:33

## 2024-01-08 RX ADMIN — Medication 50 MILLIGRAM(S): at 13:41

## 2024-01-08 RX ADMIN — MUPIROCIN 1 APPLICATION(S): 20 OINTMENT TOPICAL at 05:37

## 2024-01-08 RX ADMIN — PANTOPRAZOLE SODIUM 40 MILLIGRAM(S): 20 TABLET, DELAYED RELEASE ORAL at 05:37

## 2024-01-08 RX ADMIN — Medication 50 MILLIGRAM(S): at 05:37

## 2024-01-08 RX ADMIN — Medication 20 MILLIGRAM(S): at 05:38

## 2024-01-08 RX ADMIN — SODIUM CHLORIDE 3 MILLILITER(S): 9 INJECTION INTRAMUSCULAR; INTRAVENOUS; SUBCUTANEOUS at 21:36

## 2024-01-08 NOTE — DISCHARGE NOTE PROVIDER - NSDCCPCAREPLAN_GEN_ALL_CORE_FT
PRINCIPAL DISCHARGE DIAGNOSIS  Diagnosis: Severe mitral valve stenosis  Assessment and Plan of Treatment:       SECONDARY DISCHARGE DIAGNOSES  Diagnosis: Atrial flutter  Assessment and Plan of Treatment:      PRINCIPAL DISCHARGE DIAGNOSIS  Diagnosis: Severe mitral valve stenosis  Assessment and Plan of Treatment: - Take all medications as prescribed. Follow up with your primary care doctor/Cardiologist within two weeks.   - It is important to follow a heart-healthy, LOW SODIUM diet. You should read all food labels and keep your sodium intake less than 1500 mg per day. Examples of high sodium foods include fast food or processed food (ie frozen TV dinners), chinese food, soup and regular cold cuts.   - You should also restrict your fluid intake to less than 1-1.5 liters per day.   - Please weigh yourself every day at the same time in the morning and document your weight in a weight log.   - Please call your doctor right away if you gain over 2-3 pounds in one day, or you notice an increase in leg swelling, abdominal swelling or shortness of breath.      SECONDARY DISCHARGE DIAGNOSES  Diagnosis: Atrial flutter  Assessment and Plan of Treatment: - Take all medications as prescribed. Follow up with your primary care doctor/Cardiologist within two weeks.   - While you were in the hospital, you had an arrhythmia called atrial fibrillation. It is important to take your medications as ordered to reduce the risk of it recurring or continuing in the future. You may be on a blood thinner to reduce your risk of a stroke with this arrhythmia. If you are experiencing chest pain or palpitations, please call us right away.    Diagnosis: Benign essential HTN  Assessment and Plan of Treatment: Take all medications as prescribed. Follow up with your primary care doctor/Cardiologist within two weeks.

## 2024-01-08 NOTE — DISCHARGE NOTE PROVIDER - NSDCFUSCHEDAPPT_GEN_ALL_CORE_FT
Josue Jimenez  [facility]  [address]  Scheduled Appointment: 01/16/2024     Josue Jimenez  [facility]  [address]  Scheduled Appointment: 01/16/2024

## 2024-01-08 NOTE — PROGRESS NOTE ADULT - SUBJECTIVE AND OBJECTIVE BOX
Pt doing well s/p uncomplicated SARINA & DCCV with 150j x1. Denies complaint.     Exam:   VSS, NAD, A&O x 3  Skin: no erythema/edema/blistering at defib pad sites.   Card: S1/S2, RRR, no m/g/r  Resp: lungs CTA b/l  Abd: S/NT/ND  Ext: no edema, distal pulses intact    Post SARINA/DCCV VS:  HR 62  /59  SpO2 98% on RA    ECG: pending    Assessment:   68 year old female with a PMH of left breast cancer (s/p XRT and lumpectomy; 2009), BioMVR with MAZE (Bovine) in 2013 at [facility], HTN, and pAF on Eliquis (with remote hx of a DCCV in June 2023 at [facility] and an Ablation in 2013 in [facility]) who presented to [facility] with 3 weeks of worsening SOB and STEWART, acute on chronic diastolic CHF exacerbation, with SARINA showing severe prosthetic mitral stenosis and severe TR, s/p LHC with no obstructive CAD noted. Patient ultimately transferred to [facility] under Dr. Jimenez for surgical evaluation. Cardiology consulted for management of Afib/Flutter.  Pt now status post SARINA negative for NOA thrombus and uncomplicated DCCV with 150j x1 with restoration of sinus rhythm.     Plan:   Observation on telemetry per post op protocol.    Resume PO intake.   Ambulate w/ assist once fully awake & back to baseline mental status w/ VSS.  Continue Eliquis 5mg bid. Importance of strict compliance with anticoagulation regimen reinforced with pt.   Resume other home medications.   Further care as per CTS and Cardiology service   Pt doing well s/p uncomplicated SARINA & DCCV with 150j x1. Denies complaint.     Exam:   VSS, NAD, A&O x 3  Skin: no erythema/edema/blistering at defib pad sites.   Card: S1/S2, RRR, no m/g/r  Resp: lungs CTA b/l  Abd: S/NT/ND  Ext: no edema, distal pulses intact    Post SARINA/DCCV VS:  HR 62  /59  SpO2 98% on RA    ECG: pending    Assessment:   68 year old female with a PMH of left breast cancer (s/p XRT and lumpectomy; 2009), BioMVR with MAZE (Bovine) in 2013 at [facility], HTN, and pAF on Eliquis (with remote hx of a DCCV in June 2023 at [facility] and an Ablation in 2013 in [facility]) who presented to [facility] with 3 weeks of worsening SOB and STEWART, acute on chronic diastolic CHF exacerbation, with SARINA showing severe prosthetic mitral stenosis and severe TR, s/p LHC with no obstructive CAD noted. Patient ultimately transferred to [facility] under Dr. Jimenez for surgical evaluation. Cardiology consulted for management of Afib/Flutter.  Pt now status post SARINA negative for NOA thrombus and uncomplicated DCCV with 150j x1 with restoration of sinus rhythm.     Plan:   Observation on telemetry per post op protocol.    Resume PO intake.   Ambulate w/ assist once fully awake & back to baseline mental status w/ VSS.  Continue Eliquis 5mg bid. Importance of strict compliance with anticoagulation regimen reinforced with pt.   Resume other home medications.   Further care as per CTS and Cardiology service   Pt doing well s/p uncomplicated SARINA & DCCV with 150j x1. Denies complaint.     Exam:   VSS, NAD, A&O x 3  Skin: no erythema/edema/blistering at defib pad sites.   Card: S1/S2, RRR, no m/g/r  Resp: lungs CTA b/l  Abd: S/NT/ND  Ext: no edema, distal pulses intact    Post SARINA/DCCV VS:  HR 62  /59  SpO2 98% on RA    ECG: SR @ 61bpm, JAYLENE 182ms, QRSD 76ms    Assessment:   68 year old female with a PMH of left breast cancer (s/p XRT and lumpectomy; 2009), BioMVR with MAZE (Bovine) in 2013 at [facility], HTN, and pAF on Eliquis (with remote hx of a DCCV in June 2023 at [facility] and an Ablation in 2013 in [facility]) who presented to [facility] with 3 weeks of worsening SOB and STEWART, acute on chronic diastolic CHF exacerbation, with SARINA showing severe prosthetic mitral stenosis and severe TR, s/p LHC with no obstructive CAD noted. Patient ultimately transferred to [facility] under Dr. Jimenez for surgical evaluation. Cardiology consulted for management of Afib/Flutter.  Pt now status post SARINA negative for NOA thrombus and uncomplicated DCCV with 150j x1 with restoration of sinus rhythm.     Plan:   Observation on telemetry per post op protocol.    Resume PO intake.   Ambulate w/ assist once fully awake & back to baseline mental status w/ VSS.  Continue Eliquis 5mg bid. Importance of strict compliance with anticoagulation regimen reinforced with pt.   Resume other home medications.   Further care as per CTS and Cardiology service   Pt doing well s/p uncomplicated SARINA & DCCV with 150j x1. Denies complaint.     Exam:   VSS, NAD, A&O x 3  Skin: no erythema/edema/blistering at defib pad sites.   Card: S1/S2, RRR, no m/g/r  Resp: lungs CTA b/l  Abd: S/NT/ND  Ext: no edema, distal pulses intact    Post SARINA/DCCV VS:  HR 62  /59  SpO2 98% on RA    ECG: SR @ 61bpm, JAYLENE 182ms, QRSD 76ms    Assessment:   68 year old female with a PMH of left breast cancer (s/p XRT and lumpectomy; 2009), BioMVR with MAZE (Bovine) in 2013 at [facility], HTN, and pAF on Eliquis (with remote hx of a DCCV in June 2023 at [facility] and an Ablation in 2013 in [facility]) who [facility] with 3 weeks of worsening SOB and STEWART, acute on chronic diastolic CHF exacerbation, with SARINA showing severe prosthetic mitral stenosis and severe TR, s/p LHC with no obstructive CAD noted. Patient ultimately transferred to [facility] under Dr. Jimenez for surgical evaluation. Cardiology consulted for management of Afib/Flutter.  Pt now status post SARINA negative for NOA thrombus and uncomplicated DCCV with 150j x1 with restoration of sinus rhythm.     Plan:   Observation on telemetry per post op protocol.    Resume PO intake.   Ambulate w/ assist once fully awake & back to baseline mental status w/ VSS.  Continue Eliquis 5mg bid. Importance of strict compliance with anticoagulation regimen reinforced with pt.   Resume other home medications.   Further care as per CTS and Cardiology service

## 2024-01-08 NOTE — PROGRESS NOTE ADULT - ASSESSMENT
68F PMHx of left breast cancer (s/p XRT and lumpectomy; 2009), BioMVR with MAZE (Bovine) in 2013 at [facility], pAF (on Eliquis), HTN, originally presented to [facility] with 3 weeks of worsening SOB and STEWART, acute on chronic diastolic CHF exacerbation, with SARINA showing severe prosthetic mitral stenosis and severe TR, s/p LHC with no obstructive CAD noted. Patient ultimately transferred to [facility] under Dr. Jimenez for surgical evaluation.    68F PMHx of left breast cancer (s/p XRT and lumpectomy; 2009), BioMVR with MAZE (Bovine) in 2013 at [facility], pAF (on Eliquis), HTN, originally presented to [facility] with 3 weeks of worsening SOB and STEWART, acute on chronic diastolic CHF exacerbation, with SARINA showing severe prosthetic mitral stenosis and severe TR, s/p LHC with no obstructive CAD noted. Patient ultimately transferred to [facility] under Dr. Jimenez for surgical evaluation.

## 2024-01-08 NOTE — DISCHARGE NOTE PROVIDER - CARE PROVIDER_API CALL
Josue Jimenez  Thoracic and Cardiac Surgery  [address] [place] [address]  Phone: [phone]  Fax: [phone]  Follow Up Time:     Jc Schwartz  Internal Medicine  [address] [place] [address]  Phone: [phone]  Fax: [phone]  Follow Up Time:    Josue Jimenez  Thoracic and Cardiac Surgery  [address] [place] [address]  Phone: [phone]  Fax: [phone]  Follow Up Time:     Jc Schwartz  Internal Medicine  [address] [place] [address]  Phone: [phone]  Fax: [phone]  Follow Up Time:    Josue Jimenez  Thoracic and Cardiac Surgery  [address] [place] [address]  Phone: [phone]  Fax: [phone]  Scheduled Appointment: 01/16/2024 12:30 PM    Jc Schwartz  Internal Medicine  [address] [place] [address]  Phone: [phone]  Fax: [phone]  Follow Up Time: 1 week   Josue Jimenez  Thoracic and Cardiac Surgery  [address] [place] [address]  Phone: [phone]  Fax: [phone]  Scheduled Appointment: 01/16/2024 12:30 PM    Jc Schwartz  Internal Medicine  [address] [place] [address]  Phone: [phone]  Fax: [phone]  Follow Up Time: 1 week

## 2024-01-08 NOTE — DISCHARGE NOTE PROVIDER - CARE PROVIDERS DIRECT ADDRESSES
,[email or web address],DirectAddress_Unknown ,[email or web address],DirectAddress_Unknown

## 2024-01-08 NOTE — DISCHARGE NOTE PROVIDER - NSDCFUADDAPPT_GEN_ALL_CORE_FT
1. Follow up with Dr. Jimenez ___  The cardiac surgery office is located at [facility], first floor. Take a left at the end of the lobby until the end of that pepe (past the elevator bank). Make a left and the office is on your right across from the elevators.    2. Follow up with cardiology and primary care doctor in 1-2 weeks.  1. Follow up with Dr. Jimenez ___  The cardiac surgery office is located at [facility], first floor. Take a left at the end of the lobby until the end of that pepe (past the elevator bank). Make a left and the office is on your right across from the elevators.    2. Follow up with cardiology and primary care doctor in 1-2 weeks.  You have a follow-up appointment with Dr. Jimenez on January 16th @ 12:30 PM  Your surgical date is preemptively scheduled for January 31st.    Please follow up with your Primary Care Physician / Cardiologist in  2-4 weeks from discharge.    Please  your prescribed medications upon discharge from [facility] located on the 1st floor here at [facility].    The Cardiac Surgery office is located at [facility], first floor. Take a left at the end of the lobby until the end of that pepe (past the elevator bank). Make a left and the office is on your right across from the elevators. You have a follow-up appointment with Dr. Jimenez on January 16th @ 12:30 PM  Your surgical date is preemptively scheduled for January 31st.    Please follow up with your Primary Care Physician / Cardiologist in  2-4 weeks from discharge.    Please  your prescribed medications upon discharge from [facility] located on the 1st floor here at [facility].    The Cardiac Surgery office is located at [facility], first floor. Take a left at the end of the lobby until the end of that pepe (past the elevator bank). Make a left and the office is on your right across from the elevators.

## 2024-01-08 NOTE — DISCHARGE NOTE PROVIDER - PROVIDER TOKENS
PROVIDER:[TOKEN:[[id number]]],PROVIDER:[TOKEN:[[id number]]] PROVIDER:[TOKEN:[[id number]]],PROVIDER:[TOKEN:[[id number]]] PROVIDER:[TOKEN:[[id number]],SCHEDULEDAPPT:[01/16/2024],SCHEDULEDAPPTTIME:[12:30 PM]],PROVIDER:[TOKEN:[[id number]],FOLLOWUP:[1 week]] PROVIDER:[TOKEN:[[id number]],SCHEDULEDAPPT:[01/16/2024],SCHEDULEDAPPTTIME:[12:30 PM]],PROVIDER:[TOKEN:[[id number]],FOLLOWUP:[1 week]]

## 2024-01-08 NOTE — DISCHARGE NOTE PROVIDER - HOSPITAL COURSE
68F PMHx of left breast cancer (s/p XRT and lumpectomy; 2009), BioMVR with MAZE (Bovine) in 2013 at [facility], pAF (on Eliquis), HTN, originally presented to [facility] with 3 weeks of worsening SOB and STEWART, acute on chronic diastolic CHF exacerbation, with SARINA showing severe prosthetic mitral stenosis and severe TR, s/p LHC with no obstructive CAD noted. Patient ultimately transferred to [facility] under Dr. Jimenez for surgical evaluation.  Pt diuresed with IV lasix, lopressor adjusted for HR control, s/p successful DCCV 1/8 with conversion to SR. Heparin gtt converted to SR. Pt hemodynamically stable. Plan for discharge home with outpt f/u for surgical planning. Pt stable for discharge home as per Dr. Jimenez. 68F PMHx of left breast cancer (s/p XRT and lumpectomy; 2009), BioMVR with MAZE (Bovine) in 2013 at [facility], pAF (on Eliquis), HTN, originally presented to [facility] with 3 weeks of worsening SOB and STEWART, acute on chronic diastolic CHF exacerbation, with SARINA showing severe prosthetic mitral stenosis and severe TR, s/p LHC with no obstructive CAD noted. Patient ultimately transferred to [facility] under Dr. Jimenez for surgical evaluation.  Pt diuresed with IV lasix, lopressor adjusted for HR control, s/p successful DCCV 1/8 with conversion to SR. Heparin gtt converted to SR. Pt hemodynamically stable. Plan for discharge home with outpt f/u for surgical planning. Pt stable for discharge home as per Dr. Jimenez. 68F PMHx of left breast cancer (s/p XRT and lumpectomy; 2009), BioMVR with MAZE (Bovine) in 2013 at [facility], pAF (on Eliquis), HTN, originally presented to [facility] with 3 weeks of worsening SOB and STEWART, acute on chronic diastolic CHF exacerbation, with SARINA showing severe prosthetic mitral stenosis and severe TR, s/p LHC with no obstructive CAD noted. Patient ultimately transferred to [facility] under Dr. Jimenez for surgical evaluation. Pt diuresed with IV Lasix, Lopressor adjusted for HR control, s/p successful DCCV 1/8 with conversion to SR. Heparin gtt converted to Eliquis. Patient has remained hemodynamically stable and stable from a respiratory standpoint and is deemed appropriate for discharge home per Dr. Jimenez. Patient will be seen in clinic on follow up for surgical planning. 68F PMHx of left breast cancer (s/p XRT and lumpectomy; 2009), BioMVR with MAZE (Bovine) in 2013 at [facility], pAF (on Eliquis), HTN, originally presented to [facility] with 3 weeks of worsening SOB and STEWART, acute on chronic diastolic CHF exacerbation, with SARINA showing severe prosthetic mitral stenosis and severe TR, s/p LHC with no obstructive CAD noted. Patient ultimately transferred to [facility] under Dr. Jimenez for surgical evaluation. Pt diuresed with IV Lasix, Lopressor adjusted for HR control, s/p successful DCCV 1/8 with conversion to SR. Heparin gtt converted to Eliquis. Patient has remained hemodynamically stable and stable from a respiratory standpoint and is deemed appropriate for discharge home per Dr. Jimenez. Patient will be seen in clinic on follow up for surgical planning.

## 2024-01-08 NOTE — PROGRESS NOTE ADULT - PROBLEM SELECTOR PLAN 4
- according to pt, EF in [place]'s was 40%, s/p LHC showed non-obstructive CAD  - TTE in [facility] EF 50-55%. likely EF is lower in setting of AFlutter with RVR   - needs strict rate control , goal HR<100   - would ideally have pt in NS[facility] preop. Plan for SARINA/DCCV today   - GDMT: c/w lasix, metoprolol and spironolactone. - according to pt, EF in [place]'s was 40%, s/p LHC showed non-obstructive CAD  - TTE in [facility] EF 50-55%. likely EF is lower in setting of AFlutter with RVR   - needs strict rate control , goal HR<100   - would ideally have pt in NSR preop. Plan for SARINA/DCCV today   - GDMT: c/w lasix, metoprolol and spironolactone. - according to pt, EF in [place]'s was 40%, s/p LHC showed non-obstructive CAD  - TTE in [facility] EF 50-55%. likely EF is lower in setting of AFlutter with RVR   - needs strict rate control , goal HR<100   - would ideally have pt in NSR preop. Plan for SARINA/DCCV today   - GDMT: c/w lasix, metoprolol and spironolactone. s/p x2 lasix iv push, pt denies SOB/STEWART and reports feeling better on room air.  No thoracentesis performed. - according to pt, EF in [place]'s was 40%, s/p LHC showed non-obstructive CAD  - TTE in [facility] EF 50-55%. likely EF is lower in setting of AFlutter with RVR   - needs strict rate control , goal HR<100   - would ideally have pt in NSR preop. Plan for SARINA/DCCV today   - GDMT: c/w lasix, metoprolol and spironolactone. s/p x2 lasix iv push, pt denies SOB/STEWART and reports feeling better on room air.  No thoracentesis performed.

## 2024-01-08 NOTE — DISCHARGE NOTE PROVIDER - NSDCFUADDINST_GEN_ALL_CORE_FT
Please call the Cardiothoracic Surgery office at [phone] if you are experiencing any shortness of breath, chest pain, fevers or chills, drainage from the incisions, persistent nausea, vomiting or if you have any questions about your medications. If the symptoms are severe, call 911 and go to the nearest hospital. You can also call [phone] for an emergency [facility] ambulance, which will take you to the closest [facility].    If you need any assistance for making any appointments for a new consult or referral in any specialty, please call our [facility] Clinical Coordination Center at [phone].  Please call the Cardiothoracic Surgery office at [phone] if you are experiencing any shortness of breath, chest pain, fevers or chills, drainage from the incisions, persistent nausea, vomiting or if you have any questions about your medications. If the symptoms are severe, call 911 and go to the nearest hospital. You can also call [phone] for an emergency [facility] ambulance, which will take you to the closest [facility].    If you need any assistance for making any appointments for a new consult or referral in any specialty, please call our [facility] Clinical Coordination Center at [phone].

## 2024-01-08 NOTE — PROGRESS NOTE ADULT - SUBJECTIVE AND OBJECTIVE BOX
[facility]                                                         CARDIOLOGY AT [facility] (formerly [facility])                                                                  [address] [facility], [place] [place] [address]                                                         Telephone: [phone]. Fax:[phone]                                                                             PROGRESS NOTE    Reason for follow up: CHF, Mitral stenosis, CAD   Update:  Pt reports not feeling SOB and did not need to require nasal cannula. Pt reports that she is able to ambulate around the unit without any acute distress and is on room air while ambulating. NPO since midnight.       Review of symptoms:   Cardiac:  No chest pain. No dyspnea. No palpitations.  Respiratory: no cough. No dyspnea  Gastrointestinal: No diarrhea. No abdominal pain. No bleeding.   Neuro: No focal neuro complaints.    Vitals:  T(C): 36.7 (01-08-24 @ 07:56), Max: 36.7 (01-08-24 @ 07:56)  HR: 64 (01-08-24 @ 07:56) (64 - 110)  BP: 130/80 (01-08-24 @ 07:56) (101/68 - 133/82)  RR: 20 (01-08-24 @ 07:56) (17 - 20)  SpO2: 100% (01-08-24 @ 07:56) (90% - 100%)  Wt(kg): --  I&O's Summary    07 Jan 2024 07:01  -  08 Jan 2024 07:00  --------------------------------------------------------  IN: 420 mL / OUT: 1900 mL / NET: -1480 mL      Weight (kg): 73.7 (01-08 @ 05:25)    PHYSICAL EXAM:  Appearance: Comfortable. No acute distress  HEENT:  Atraumatic. Normocephalic.  Normal oral mucosa  Neurologic: A & O x 3, no gross focal deficits.  Cardiovascular: Irregularly irregular. No murmur, no rubs/gallops. No JVD  Respiratory: Fine crackles B/L bases. Room air. unlabored   Gastrointestinal:  Soft, Non-tender, + BS  Lower Extremities: 2+ Peripheral Pulses, No clubbing and cyanosis. B/L LE trace edema  Psychiatry: Patient is calm. No agitation.   Skin: warm and dry.    CURRENT CARDIAC MEDICATIONS:  furosemide    Tablet 20 milliGRAM(s) Oral daily  metoprolol tartrate 50 milliGRAM(s) Oral every 8 hours  spironolactone 25 milliGRAM(s) Oral daily      CURRENT OTHER MEDICATIONS:  acetaminophen     Tablet .. 650 milliGRAM(s) Oral every 6 hours PRN Mild Pain (1 - 3)  pantoprazole    Tablet 40 milliGRAM(s) Oral before breakfast  atorvastatin 80 milliGRAM(s) Oral at bedtime  heparin   Injectable 3000 Unit(s) IV Push every 6 hours PRN For aPTT between 40 - 57  heparin   Injectable 6000 Unit(s) IV Push every 6 hours PRN For aPTT less than 40  heparin  Infusion. 900 Unit(s)/Hr (9 mL/Hr) IV Continuous <Continuous>  mupirocin 2% Ointment 1 Application(s) Both Nostrils every 12 hours, Stop order after: 10 Doses  sodium chloride 0.9% lock flush 3 milliLiter(s) IV Push every 8 hours      LABS:	 	  ( 05 Jan 2024 21:15 )  Troponin T  X    ,  CPK  39   , CKMB  X    , BNP X                                  11.6   5.22  )-----------( 192      ( 08 Jan 2024 05:03 )             34.1     01-08    139  |  101  |  23.6<H>  ----------------------------<  89  3.9   |  23.0  |  0.90    Ca    8.9      08 Jan 2024 05:03  Mg     1.9     01-08      PT/INR/PTT ( 08 Jan 2024 06:45 )                       :                       :      X            :       82.8                  .        .                   .              .           .       X           .                                       Lipid Profile:   HgA1c: 5.6%   TSH: Thyroid Stimulating Hormone, Serum: 8.01 uIU/mL      TELEMETRY: Aflutter   ECG: Aflutter     DIAGNOSTIC TESTING:  [ x] Echocardiogram: < from: TTE W or WO Ultrasound Enhancing Agent (01.06.24 @ 12:12) >   1. Left ventricular cavity is normal. Left ventricular systolic function is low normal with an ejection fraction of 55 % by Simmons's method of disks with an ejection fraction visually estimated at 50 to 55 %.   2. Mild left ventricular hypertrophy.   3. Normal right ventricular cavity size.   4. The left atrium is severely dilated.   5. The right atrium is mildly dilated in size.   6. Aortic root at the sinuses of Valsalva is normal in size, measuring 3.00 cm (indexed 1.61 cm/m²).   7. Moderate to severe mitral valve stenosis.   8. Moderate tricuspid regurgitation.   9. Estimated pulmonary artery systolic pressure is 55 mmHg, consistent with severe pulmonary hypertension.  10. No pericardial effusion seen.  11. Large left pleural effusion noted.    < end of copied text >    [ ]  Catheterization:  [ ] Stress Test:    OTHER: 	                                                                [facility]                                                         CARDIOLOGY AT [facility]                                                                  [address] [facility], [place] [place] [address]                                                         Telephone: [phone]. Fax:[phone]                                                                             PROGRESS NOTE    Reason for follow up: CHF, Mitral stenosis, CAD   Update:  Pt reports not feeling SOB and did not need to require nasal cannula. Pt reports that she is able to ambulate around the unit without any acute distress and is on room air while ambulating. NPO since midnight.       Review of symptoms:   Cardiac:  No chest pain. No dyspnea. No palpitations.  Respiratory: no cough. No dyspnea  Gastrointestinal: No diarrhea. No abdominal pain. No bleeding.   Neuro: No focal neuro complaints.    Vitals:  T(C): 36.7 (01-08-24 @ 07:56), Max: 36.7 (01-08-24 @ 07:56)  HR: 64 (01-08-24 @ 07:56) (64 - 110)  BP: 130/80 (01-08-24 @ 07:56) (101/68 - 133/82)  RR: 20 (01-08-24 @ 07:56) (17 - 20)  SpO2: 100% (01-08-24 @ 07:56) (90% - 100%)  Wt(kg): --  I&O's Summary    07 Jan 2024 07:01  -  08 Jan 2024 07:00  --------------------------------------------------------  IN: 420 mL / OUT: 1900 mL / NET: -1480 mL      Weight (kg): 73.7 (01-08 @ 05:25)    PHYSICAL EXAM:  Appearance: Comfortable. No acute distress  HEENT:  Atraumatic. Normocephalic.  Normal oral mucosa  Neurologic: A & O x 3, no gross focal deficits.  Cardiovascular: Irregularly irregular. No murmur, no rubs/gallops. No JVD  Respiratory: Fine crackles B/L bases. Room air. unlabored   Gastrointestinal:  Soft, Non-tender, + BS  Lower Extremities: 2+ Peripheral Pulses, No clubbing and cyanosis. B/L LE trace edema  Psychiatry: Patient is calm. No agitation.   Skin: warm and dry.    CURRENT CARDIAC MEDICATIONS:  furosemide    Tablet 20 milliGRAM(s) Oral daily  metoprolol tartrate 50 milliGRAM(s) Oral every 8 hours  spironolactone 25 milliGRAM(s) Oral daily      CURRENT OTHER MEDICATIONS:  acetaminophen     Tablet .. 650 milliGRAM(s) Oral every 6 hours PRN Mild Pain (1 - 3)  pantoprazole    Tablet 40 milliGRAM(s) Oral before breakfast  atorvastatin 80 milliGRAM(s) Oral at bedtime  heparin   Injectable 3000 Unit(s) IV Push every 6 hours PRN For aPTT between 40 - 57  heparin   Injectable 6000 Unit(s) IV Push every 6 hours PRN For aPTT less than 40  heparin  Infusion. 900 Unit(s)/Hr (9 mL/Hr) IV Continuous <Continuous>  mupirocin 2% Ointment 1 Application(s) Both Nostrils every 12 hours, Stop order after: 10 Doses  sodium chloride 0.9% lock flush 3 milliLiter(s) IV Push every 8 hours      LABS:	 	  ( 05 Jan 2024 21:15 )  Troponin T  X    ,  CPK  39   , CKMB  X    , BNP X                                  11.6   5.22  )-----------( 192      ( 08 Jan 2024 05:03 )             34.1     01-08    139  |  101  |  23.6<H>  ----------------------------<  89  3.9   |  23.0  |  0.90    Ca    8.9      08 Jan 2024 05:03  Mg     1.9     01-08      PT/INR/PTT ( 08 Jan 2024 06:45 )                       :                       :      X            :       82.8                  .        .                   .              .           .       X           .                                       Lipid Profile:   HgA1c: 5.6%   TSH: Thyroid Stimulating Hormone, Serum: 8.01 uIU/mL      TELEMETRY: Aflutter   ECG: Aflutter     DIAGNOSTIC TESTING:  [ x] Echocardiogram: < from: TTE W or WO Ultrasound Enhancing Agent (01.06.24 @ 12:12) >   1. Left ventricular cavity is normal. Left ventricular systolic function is low normal with an ejection fraction of 55 % by Simmons's method of disks with an ejection fraction visually estimated at 50 to 55 %.   2. Mild left ventricular hypertrophy.   3. Normal right ventricular cavity size.   4. The left atrium is severely dilated.   5. The right atrium is mildly dilated in size.   6. Aortic root at the sinuses of Valsalva is normal in size, measuring 3.00 cm (indexed 1.61 cm/m²).   7. Moderate to severe mitral valve stenosis.   8. Moderate tricuspid regurgitation.   9. Estimated pulmonary artery systolic pressure is 55 mmHg, consistent with severe pulmonary hypertension.  10. No pericardial effusion seen.  11. Large left pleural effusion noted.    < end of copied text >    [ ]  Catheterization:  [ ] Stress Test:    OTHER:

## 2024-01-08 NOTE — PROGRESS NOTE ADULT - ASSESSMENT
68 year old female with a PMH of left breast cancer (s/p XRT and lumpectomy; 2009), BioMVR with MAZE (Bovine) in 2013 at [facility], pAF (on Eliquis), HTN, originally presented to [facility] with 3 weeks of worsening SOB and STEWART, acute on chronic diastolic CHF exacerbation, with SARINA showing severe prosthetic mitral stenosis and severe TR, s/p LHC with no obstructive CAD noted. Patient ultimately transferred to [facility] under Dr. Jimenez for surgical evaluation. Cardiology consulted for management of Afib/Flutter   Echo EF 55% La severely dilated, moderate to severe mitral stenosis, Moderate TR RSVP 55mm/gh  68 year old female with a PMH of left breast cancer (s/p XRT and lumpectomy; 2009), BioMVR with MAZE (Bovine) in 2013 at [facility], pAF (on Eliquis), HTN, originally presented to [facility] with 3 weeks of worsening SOB and STEWART, acute on chronic diastolic CHF exacerbation, with SARINA showing severe prosthetic mitral stenosis and severe TR, s/p LHC with no obstructive CAD noted. Patient ultimately transferred to [facility] under Dr. Jimenez for surgical evaluation. Cardiology consulted for management of Afib/Flutter   Echo EF 55% La severely dilated, moderate to severe mitral stenosis, Moderate TR RSVP 55mm/gh

## 2024-01-08 NOTE — PROGRESS NOTE ADULT - PROBLEM SELECTOR PLAN 1
Originally presented to [facility] with 3 weeks of worsening SOB and STEWART, acute on chronic diastolic CHF exacerbation, with SARINA showing Severe Prosthetic Mitral Stenosis, Moderate MR, and Severe TR.   S/p LHC with no obstructive CAD noted.   Patient ultimately transferred to [facility] under Dr. Jimenez for surgical evaluation.     Preoperative workup underway.   heparin gtt for AC (on eliquis at home) currently remains Aflutter HR 80-100s.   Potential plan for SARINA/DCCV today if still in afib RVR/aflutter.  Continue Lasix 20PO QD and Aldactone for diuresis as tolerated by renal function and SBP.  CTA chest performed at [place]. Patient had radiation to the Left chest and will be a reop.    still with intermittent SOB, POCUS 1/7 with mod b/l effusions, plan for thoracentesis today, hold heparin gtt at 6AM  Plan to be discussed / reviewed with CT Surgery attending / team during AM rounds. Originally presented to [facility] with 3 weeks of worsening SOB and STEWART, acute on chronic diastolic CHF exacerbation, with SARINA showing Severe Prosthetic Mitral Stenosis, Moderate MR, and Severe TR.   S/p LHC with no obstructive CAD noted.   Patient ultimately transferred to [facility] under Dr. Jimenez for surgical evaluation.     Preoperative workup underway.   heparin gtt for AC (on eliquis at home) currently remains Aflutter HR 80-100s.   Potential plan for SARINA/DCCV today if still in afib RVR/aflutter.  Continue Lasix 20PO QD and Aldactone for diuresis as tolerated by renal function and SBP.  CTA chest performed at [place]. Patient had radiation to the Left chest and will be a reop.    still with intermittent SOB, POCUS 1/7 with mod b/l effusions, plan for thoracentesis today, hold heparin gtt at 6AM  Plan to be discussed / reviewed with CT Surgery attending / team during AM rounds.

## 2024-01-08 NOTE — DISCHARGE NOTE PROVIDER - NSDCMRMEDTOKEN_GEN_ALL_CORE_FT
Eliquis 5 mg oral tablet: 1 tab(s) orally 2 times a day  metoprolol succinate 50 mg oral capsule, extended release: 1 cap(s) orally 2 times a day  rosuvastatin 20 mg oral tablet: 1 tab(s) orally once a day   acetaminophen 325 mg oral tablet: 2 tab(s) orally every 6 hours As needed Mild Pain (1 - 3)  apixaban 5 mg oral tablet: 1 tab(s) orally 2 times a day  furosemide 20 mg oral tablet: 1 tab(s) orally once a day  metoprolol tartrate 50 mg oral tablet: 1 tab(s) orally 2 times a day  rosuvastatin 20 mg oral tablet: 1 tab(s) orally once a day  spironolactone 25 mg oral tablet: 1 tab(s) orally once a day

## 2024-01-08 NOTE — PROGRESS NOTE ADULT - NS ATTEND AMEND GEN_ALL_CORE FT
-      68F hx left breast cancer (s/p XRT and lumpectomy; 2009), BioMVR with MAZE (Bovine) in 2013 at [facility], pAF (on Eliquis), presented to [facility], acute on chronic diastolic CHF exacerbation, with SARINA showing severe prosthetic mitral stenosis and severe TR, s/p LHC with no obstructive CAD noted. Patient ultimately transferred to [facility] under Dr. Jimenez for surgical evaluation. Cardiology consulted for management of     s/p BioMVR with  Severe mitral valve stenosis,   NPO since midnight for SARINA/DCCV today  s/p lasix iv push x2, pt denies SOB/STEWART. No thoracenteses performed today as per CT surgery.   c/w Heparin drip full anticoagulation    Acute HFrEF (heart failure with reduced ejection fraction).   according to pt, EF in [place] was 40%, s/p LHC showed non-obstructive CAD  TTE in [facility] EF 50-55%. likely EF is lower in setting of AFlutter with RVR   Plan for SARINA/DCCV today   GDMT    Afib/Flutter   TTE EF 55% La severely dilated, moderate to severe mitral stenosis, Moderate TR RSVP 55mm/gh -      68F hx left breast cancer (s/p XRT and lumpectomy; 2009), BioMVR with MAZE (Bovine) in 2013 at [facility], pAF (on Eliquis), presented to [facility], acute on chronic diastolic CHF exacerbation, with SARINA showing severe prosthetic mitral stenosis and severe TR, s/p LHC with no obstructive CAD noted. Patient ultimately transferred to [facility] under Dr. Jimenez for surgical evaluation. Cardiology consulted for management of     s/p BioMVR with  Severe mitral valve stenosis,   NPO since midnight for SARINA/DCCV today  s/p lasix iv push x2, pt denies SOB/STEWART. No thoracenteses performed today as per CT surgery.   c/w Heparin drip full anticoagulation    Acute HFrEF (heart failure with reduced ejection fraction).   according to pt, EF in [place] was 40%, s/p LHC showed non-obstructive CAD  TTE in [facility] EF 50-55%. likely EF is lower in setting of AFlutter with RVR   Plan for SARINA/DCCV today   GDMT    Afib/Flutter   TTE EF 55% La severely dilated, moderate to severe mitral stenosis, Moderate TR RSVP 55mm/gh

## 2024-01-08 NOTE — PROGRESS NOTE ADULT - PROBLEM SELECTOR PLAN 1
- Will need optimization before surgical procedures  - NPO since midnight for SARINA/DCCV today after thoracentesis   - plan for eventual open MVR.   - Difficult to maintain volume status due to MS/TR and uncontrolled HR  - c/w Heparin drip full anticoagulation nomogram for anticoagulation    - eventual Lovenox as outpatient if Eliquis is still on hold . - Will need optimization before surgical procedures  - NPO since midnight for SARINA/DCCV today  - s/p lasix iv push x2, pt denies SOB/STEWART. No thoracenteses performed today as per CT surgery.   - plan for eventual open MVR.   - Difficult to maintain volume status due to MS/TR and uncontrolled HR  - c/w Heparin drip full anticoagulation nomogram for anticoagulation    - eventual Lovenox as outpatient if Eliquis is still on hold .

## 2024-01-08 NOTE — DISCHARGE NOTE PROVIDER - NSDCACTIVITY_GEN_ALL_CORE
Showering allowed/Stairs allowed/Walking - Indoors allowed/No heavy lifting/straining/Walking - Outdoors allowed/Follow Instructions Provided by your Surgical Team Bathing allowed/Showering allowed/Stairs allowed/Walking - Indoors allowed/No heavy lifting/straining/Walking - Outdoors allowed/Follow Instructions Provided by your Surgical Team

## 2024-01-08 NOTE — PROGRESS NOTE ADULT - SUBJECTIVE AND OBJECTIVE BOX
SARINA/DCCV check-in     68 year old female with a PMH of left breast cancer (s/p XRT and lumpectomy; 2009), BioMVR with MAZE (Bovine) in 2013 at [facility], HTN, and pAF on Eliquis (with remote hx of a DCCV in June 2023 at [facility] and an Ablation in 2013 in [facility]) who presented to [facility] with 3 weeks of worsening SOB and STEWART, acute on chronic diastolic CHF exacerbation, with SARINA showing severe prosthetic mitral stenosis and severe TR, s/p LHC with no obstructive CAD noted. Patient ultimately transferred to [facility] under Dr. Jimenez for surgical evaluation. Cardiology consulted for management of Afib/Flutter.  Pt arrives to holding area for elective SARINA/DCCV on Heparin gtt with most recent ptt of 58.4.  Pt was previously on Eliquis 5mg bid but it was discontinued for possible thoracentesis however upon repeat bedside US, pleural effusions were noted to be improved with diuretic regiment so thoracentesis was deferred.  D/w CTS and patient has no procedures planned and is ok to be restarted on eliquis for DCCV.  Eliquis 5mg given stat with plan of stopping heparin in one hour.  NPO status confirmed.  Will proceed with SARINA/DCCV.     SARINA/DCCV check-in     68 year old female with a PMH of left breast cancer (s/p XRT and lumpectomy; 2009), BioMVR with MAZE (Bovine) in 2013 at [facility], HTN, and pAF on Eliquis (with remote hx of a DCCV in June 2023 at [facility] and an Ablation in 2013 in [facility]) who presented to [facility] with 3 weeks of worsening SOB and STEWART, acute on chronic diastolic CHF exacerbation, with SARINA showing severe prosthetic mitral stenosis and severe TR, s/p LHC with no obstructive CAD noted. Patient ultimately transferred to [facility] under Dr. Jimenez for surgical evaluation. Cardiology consulted for management of Afib/Flutter.  Pt arrives to holding area for elective SARINA/DCCV on Heparin gtt with most recent ptt of 58.4.  Pt was previously on Eliquis 5mg bid but it was discontinued for possible thoracentesis however upon repeat bedside US, pleural effusions were noted to be improved with diuretic regiment so thoracentesis was deferred.  D/w CTS and patient has no procedures planned and is ok to be restarted on eliquis for DCCV.  Eliquis 5mg given stat with plan of stopping heparin in one hour.  NPO status confirmed.  Will proceed with SARINA/DCCV.

## 2024-01-08 NOTE — PROGRESS NOTE ADULT - SUBJECTIVE AND OBJECTIVE BOX
Subjective: no complaints denies CP, palpitations, SOB, cough, fever, chills, itchiness/rash, diaphoresis, vision changes, HA, dizziness/lightheadedness, numbness/tingling, abd pain, N/V     T(C): 36.3 (01-07-24 @ 23:32), Max: 36.7 (01-07-24 @ 04:54)  HR: 95 (01-07-24 @ 23:32) (53 - 110)  BP: 133/82 (01-07-24 @ 23:32) (101/68 - 145/89)  RR: 17 (01-07-24 @ 23:32) (17 - 20)  SpO2: 96% (01-07-24 @ 23:32) (90% - 100%)    01-07    141  |  105  |  22.7<H>  ----------------------------<  93  4.0   |  23.0  |  0.87    Ca    8.7      07 Jan 2024 04:47  Mg     1.9     01-07                                 12.1   5.52  )-----------( 215      ( 07 Jan 2024 22:34 )             38.2        PTT - ( 07 Jan 2024 22:34 )  PTT:88.3 sec    I&O's Detail    06 Jan 2024 07:01  -  07 Jan 2024 07:00  --------------------------------------------------------  IN:    Heparin Infusion: 27 mL    Oral Fluid: 240 mL  Total IN: 267 mL    OUT:    Voided (mL): 1450 mL  Total OUT: 1450 mL  Total NET: -1183 mL    07 Jan 2024 07:01  -  08 Jan 2024 00:42  --------------------------------------------------------  IN:    Oral Fluid: 420 mL  Total IN: 420 mL    OUT:    Voided (mL): 1900 mL  Total OUT: 1900 mL  Total NET: -1480 mL    Drug Dosing Weight  Weight (kg): 72.7 (05 Jan 2024 21:23)    MEDICATIONS  (STANDING):  atorvastatin 80 milliGRAM(s) Oral at bedtime  furosemide    Tablet 20 milliGRAM(s) Oral daily  heparin  Infusion. 900 Unit(s)/Hr (9 mL/Hr) IV Continuous <Continuous>  metoprolol tartrate 50 milliGRAM(s) Oral every 8 hours  mupirocin 2% Ointment 1 Application(s) Both Nostrils every 12 hours  pantoprazole    Tablet 40 milliGRAM(s) Oral before breakfast  sodium chloride 0.9% lock flush 3 milliLiter(s) IV Push every 8 hours  spironolactone 25 milliGRAM(s) Oral daily    MEDICATIONS  (PRN):  acetaminophen     Tablet .. 650 milliGRAM(s) Oral every 6 hours PRN Mild Pain (1 - 3)  heparin   Injectable 3000 Unit(s) IV Push every 6 hours PRN For aPTT between 40 - 57  heparin   Injectable 6000 Unit(s) IV Push every 6 hours PRN For aPTT less than 40    Physical Exam  Gen: NAD   Neuro: A&Ox3 non focal speech clear and intact   Pulm: crackle R base, decreased BS b/l no wheezing   CV: S1S2 regular   Abd: +BS soft NT ND   Extrem/MS: trace b/l LE edema, no cyanosis CHAPMAN  skin: No rashes, WWP

## 2024-01-08 NOTE — PROGRESS NOTE ADULT - PROBLEM SELECTOR PLAN 4
Elevated TSH of 8 on arrival.   TSH was 5.6 at [place] prior to transfer, with normal T4 and Thyroxine level. Elevated TSH of 8 on arrival.   TSH was 5.6 at [place] prior to transfer, with normal T4 and Thyroxine level.

## 2024-01-08 NOTE — PROGRESS NOTE ADULT - PROBLEM SELECTOR PLAN 2
- Aflutter on telemetry with HR between 70 to 100s.   - monitor on telemetry  - c/w heparin drip full anticoagulation protocol  - eventual transition to Eliquis prior to d/c   - [id number] 3   - c/w metoprolol 50 mg TID PO   - NPO since midnight for SARINA/DCCV today. - Aflutter on telemetry with HR between 70 to 100s.   - monitor on telemetry  - c/w heparin drip full anticoagulation protocol  - eventual transition to Eliquis prior to d/c   - [id number] 3   - c/w metoprolol 50 mg TID PO   - NPO since midnight for SARINA/DCCV today.

## 2024-01-09 ENCOUNTER — NON-APPOINTMENT (OUTPATIENT)
Age: 69
End: 2024-01-09

## 2024-01-09 ENCOUNTER — TRANSCRIPTION ENCOUNTER (OUTPATIENT)
Age: 69
End: 2024-01-09

## 2024-01-09 VITALS
SYSTOLIC BLOOD PRESSURE: 116 MMHG | HEART RATE: 68 BPM | DIASTOLIC BLOOD PRESSURE: 75 MMHG | TEMPERATURE: 98 F | RESPIRATION RATE: 18 BRPM | OXYGEN SATURATION: 95 %

## 2024-01-09 LAB
ANION GAP SERPL CALC-SCNC: 11 MMOL/L — SIGNIFICANT CHANGE UP (ref 5–17)
ANION GAP SERPL CALC-SCNC: 11 MMOL/L — SIGNIFICANT CHANGE UP (ref 5–17)
APTT BLD: 32.2 SEC — SIGNIFICANT CHANGE UP (ref 24.5–35.6)
APTT BLD: 32.2 SEC — SIGNIFICANT CHANGE UP (ref 24.5–35.6)
BUN SERPL-MCNC: 24.2 MG/DL — HIGH (ref 8–20)
BUN SERPL-MCNC: 24.2 MG/DL — HIGH (ref 8–20)
CALCIUM SERPL-MCNC: 9.5 MG/DL — SIGNIFICANT CHANGE UP (ref 8.4–10.5)
CALCIUM SERPL-MCNC: 9.5 MG/DL — SIGNIFICANT CHANGE UP (ref 8.4–10.5)
CHLORIDE SERPL-SCNC: 101 MMOL/L — SIGNIFICANT CHANGE UP (ref 96–108)
CHLORIDE SERPL-SCNC: 101 MMOL/L — SIGNIFICANT CHANGE UP (ref 96–108)
CO2 SERPL-SCNC: 26 MMOL/L — SIGNIFICANT CHANGE UP (ref 22–29)
CO2 SERPL-SCNC: 26 MMOL/L — SIGNIFICANT CHANGE UP (ref 22–29)
CREAT SERPL-MCNC: 0.95 MG/DL — SIGNIFICANT CHANGE UP (ref 0.5–1.3)
CREAT SERPL-MCNC: 0.95 MG/DL — SIGNIFICANT CHANGE UP (ref 0.5–1.3)
EGFR: 66 ML/MIN/1.73M2 — SIGNIFICANT CHANGE UP
EGFR: 66 ML/MIN/1.73M2 — SIGNIFICANT CHANGE UP
GLUCOSE SERPL-MCNC: 98 MG/DL — SIGNIFICANT CHANGE UP (ref 70–99)
GLUCOSE SERPL-MCNC: 98 MG/DL — SIGNIFICANT CHANGE UP (ref 70–99)
HCT VFR BLD CALC: 35.7 % — SIGNIFICANT CHANGE UP (ref 34.5–45)
HCT VFR BLD CALC: 35.7 % — SIGNIFICANT CHANGE UP (ref 34.5–45)
HGB BLD-MCNC: 12.1 G/DL — SIGNIFICANT CHANGE UP (ref 11.5–15.5)
HGB BLD-MCNC: 12.1 G/DL — SIGNIFICANT CHANGE UP (ref 11.5–15.5)
MAGNESIUM SERPL-MCNC: 2.2 MG/DL — SIGNIFICANT CHANGE UP (ref 1.6–2.6)
MAGNESIUM SERPL-MCNC: 2.2 MG/DL — SIGNIFICANT CHANGE UP (ref 1.6–2.6)
MCHC RBC-ENTMCNC: 32.4 PG — SIGNIFICANT CHANGE UP (ref 27–34)
MCHC RBC-ENTMCNC: 32.4 PG — SIGNIFICANT CHANGE UP (ref 27–34)
MCHC RBC-ENTMCNC: 33.9 GM/DL — SIGNIFICANT CHANGE UP (ref 32–36)
MCHC RBC-ENTMCNC: 33.9 GM/DL — SIGNIFICANT CHANGE UP (ref 32–36)
MCV RBC AUTO: 95.5 FL — SIGNIFICANT CHANGE UP (ref 80–100)
MCV RBC AUTO: 95.5 FL — SIGNIFICANT CHANGE UP (ref 80–100)
PLATELET # BLD AUTO: 189 K/UL — SIGNIFICANT CHANGE UP (ref 150–400)
PLATELET # BLD AUTO: 189 K/UL — SIGNIFICANT CHANGE UP (ref 150–400)
POTASSIUM SERPL-MCNC: 4.9 MMOL/L — SIGNIFICANT CHANGE UP (ref 3.5–5.3)
POTASSIUM SERPL-MCNC: 4.9 MMOL/L — SIGNIFICANT CHANGE UP (ref 3.5–5.3)
POTASSIUM SERPL-SCNC: 4.9 MMOL/L — SIGNIFICANT CHANGE UP (ref 3.5–5.3)
POTASSIUM SERPL-SCNC: 4.9 MMOL/L — SIGNIFICANT CHANGE UP (ref 3.5–5.3)
RBC # BLD: 3.74 M/UL — LOW (ref 3.8–5.2)
RBC # BLD: 3.74 M/UL — LOW (ref 3.8–5.2)
RBC # FLD: 14.3 % — SIGNIFICANT CHANGE UP (ref 10.3–14.5)
RBC # FLD: 14.3 % — SIGNIFICANT CHANGE UP (ref 10.3–14.5)
SODIUM SERPL-SCNC: 138 MMOL/L — SIGNIFICANT CHANGE UP (ref 135–145)
SODIUM SERPL-SCNC: 138 MMOL/L — SIGNIFICANT CHANGE UP (ref 135–145)
WBC # BLD: 4.91 K/UL — SIGNIFICANT CHANGE UP (ref 3.8–10.5)
WBC # BLD: 4.91 K/UL — SIGNIFICANT CHANGE UP (ref 3.8–10.5)
WBC # FLD AUTO: 4.91 K/UL — SIGNIFICANT CHANGE UP (ref 3.8–10.5)
WBC # FLD AUTO: 4.91 K/UL — SIGNIFICANT CHANGE UP (ref 3.8–10.5)

## 2024-01-09 PROCEDURE — 85025 COMPLETE CBC W/AUTO DIFF WBC: CPT

## 2024-01-09 PROCEDURE — 99232 SBSQ HOSP IP/OBS MODERATE 35: CPT

## 2024-01-09 PROCEDURE — 93880 EXTRACRANIAL BILAT STUDY: CPT

## 2024-01-09 PROCEDURE — 71045 X-RAY EXAM CHEST 1 VIEW: CPT | Mod: 26

## 2024-01-09 PROCEDURE — 85730 THROMBOPLASTIN TIME PARTIAL: CPT

## 2024-01-09 PROCEDURE — 84134 ASSAY OF PREALBUMIN: CPT

## 2024-01-09 PROCEDURE — 82550 ASSAY OF CK (CPK): CPT

## 2024-01-09 PROCEDURE — 85027 COMPLETE CBC AUTOMATED: CPT

## 2024-01-09 PROCEDURE — 86850 RBC ANTIBODY SCREEN: CPT

## 2024-01-09 PROCEDURE — 84443 ASSAY THYROID STIM HORMONE: CPT

## 2024-01-09 PROCEDURE — 93325 DOPPLER ECHO COLOR FLOW MAPG: CPT

## 2024-01-09 PROCEDURE — 84484 ASSAY OF TROPONIN QUANT: CPT

## 2024-01-09 PROCEDURE — 80053 COMPREHEN METABOLIC PANEL: CPT

## 2024-01-09 PROCEDURE — 93306 TTE W/DOPPLER COMPLETE: CPT

## 2024-01-09 PROCEDURE — 83880 ASSAY OF NATRIURETIC PEPTIDE: CPT

## 2024-01-09 PROCEDURE — 93320 DOPPLER ECHO COMPLETE: CPT

## 2024-01-09 PROCEDURE — 86901 BLOOD TYPING SEROLOGIC RH(D): CPT

## 2024-01-09 PROCEDURE — 80048 BASIC METABOLIC PNL TOTAL CA: CPT

## 2024-01-09 PROCEDURE — 87641 MR-STAPH DNA AMP PROBE: CPT

## 2024-01-09 PROCEDURE — 86900 BLOOD TYPING SEROLOGIC ABO: CPT

## 2024-01-09 PROCEDURE — 93312 ECHO TRANSESOPHAGEAL: CPT

## 2024-01-09 PROCEDURE — 87640 STAPH A DNA AMP PROBE: CPT

## 2024-01-09 PROCEDURE — 83036 HEMOGLOBIN GLYCOSYLATED A1C: CPT

## 2024-01-09 PROCEDURE — 83735 ASSAY OF MAGNESIUM: CPT

## 2024-01-09 PROCEDURE — 71045 X-RAY EXAM CHEST 1 VIEW: CPT

## 2024-01-09 PROCEDURE — 85576 BLOOD PLATELET AGGREGATION: CPT

## 2024-01-09 PROCEDURE — 36415 COLL VENOUS BLD VENIPUNCTURE: CPT

## 2024-01-09 PROCEDURE — 81003 URINALYSIS AUTO W/O SCOPE: CPT

## 2024-01-09 PROCEDURE — 93005 ELECTROCARDIOGRAM TRACING: CPT

## 2024-01-09 PROCEDURE — 99231 SBSQ HOSP IP/OBS SF/LOW 25: CPT

## 2024-01-09 PROCEDURE — 85610 PROTHROMBIN TIME: CPT

## 2024-01-09 PROCEDURE — 94010 BREATHING CAPACITY TEST: CPT

## 2024-01-09 RX ORDER — METOPROLOL TARTRATE 50 MG
1 TABLET ORAL
Refills: 0 | DISCHARGE

## 2024-01-09 RX ORDER — APIXABAN 2.5 MG/1
1 TABLET, FILM COATED ORAL
Refills: 0 | DISCHARGE

## 2024-01-09 RX ORDER — METOPROLOL TARTRATE 50 MG
1 TABLET ORAL
Qty: 60 | Refills: 0
Start: 2024-01-09 | End: 2024-02-07

## 2024-01-09 RX ORDER — APIXABAN 2.5 MG/1
1 TABLET, FILM COATED ORAL
Qty: 60 | Refills: 1
Start: 2024-01-09 | End: 2024-03-08

## 2024-01-09 RX ORDER — SPIRONOLACTONE 25 MG/1
1 TABLET, FILM COATED ORAL
Qty: 30 | Refills: 0
Start: 2024-01-09 | End: 2024-02-07

## 2024-01-09 RX ORDER — ACETAMINOPHEN 500 MG
2 TABLET ORAL
Qty: 0 | Refills: 0 | DISCHARGE
Start: 2024-01-09

## 2024-01-09 RX ORDER — FUROSEMIDE 40 MG
1 TABLET ORAL
Qty: 30 | Refills: 0
Start: 2024-01-09 | End: 2024-02-07

## 2024-01-09 RX ORDER — SACUBITRIL AND VALSARTAN 24; 26 MG/1; MG/1
1 TABLET, FILM COATED ORAL
Refills: 0 | Status: DISCONTINUED | OUTPATIENT
Start: 2024-01-09 | End: 2024-01-09

## 2024-01-09 RX ADMIN — Medication 50 MILLIGRAM(S): at 14:22

## 2024-01-09 RX ADMIN — MUPIROCIN 1 APPLICATION(S): 20 OINTMENT TOPICAL at 05:17

## 2024-01-09 RX ADMIN — PANTOPRAZOLE SODIUM 40 MILLIGRAM(S): 20 TABLET, DELAYED RELEASE ORAL at 05:15

## 2024-01-09 RX ADMIN — Medication 20 MILLIGRAM(S): at 05:17

## 2024-01-09 RX ADMIN — Medication 50 MILLIGRAM(S): at 05:16

## 2024-01-09 RX ADMIN — SPIRONOLACTONE 25 MILLIGRAM(S): 25 TABLET, FILM COATED ORAL at 05:15

## 2024-01-09 RX ADMIN — APIXABAN 5 MILLIGRAM(S): 2.5 TABLET, FILM COATED ORAL at 05:16

## 2024-01-09 RX ADMIN — SODIUM CHLORIDE 3 MILLILITER(S): 9 INJECTION INTRAMUSCULAR; INTRAVENOUS; SUBCUTANEOUS at 09:07

## 2024-01-09 RX ADMIN — SACUBITRIL AND VALSARTAN 1 TABLET(S): 24; 26 TABLET, FILM COATED ORAL at 12:40

## 2024-01-09 NOTE — PROGRESS NOTE ADULT - NS ATTEND AMEND GEN_ALL_CORE FT
-    68F hx left breast cancer (s/p XRT and lumpectomy; 2009), BioMVR with MAZE (Bovine) in 2013 at [facility], pAF (on Eliquis), presented to [facility]  STEWART, acute on chronic diastolic CHF exacerbation, with SARINA showing severe prosthetic mitral stenosis and severe TR, s/p LHC with no obstructive CAD noted. Patient ultimately transferred to [facility] under Dr. Jimenez for surgical evaluation. Cardiology consulted for management of     s/p BioMVR with  Severe mitral valve stenosis,   Pt was initally at [facility] with HF exacerbation, with SARINA showing Severe Prosthetic Mitral Stenosis, Moderate MR, and Severe TR.   S/p LHC with no obstructive CAD noted.   Patient ultimately transferred to [facility] under Dr. Jimenez for surgical evaluation.     CTS on the case completed preop LUGO  Cont GDMT  Pt is for D/C home with OP FU with CTS for OR planning     Acute HFrEF (heart failure with reduced ejection fraction).   according to pt, EF in [place]'s was 40%, s/p LHC showed non-obstructive CAD  TTE in [facility] EF 50-55%. likely EF is lower in setting of AFlutter with RVR   GDMT as tolerated    AF/Afl   H/o MAZE procedure in 2013, with recurrent PAF since.   now, s/p successful DCCV 1/8  heparin gtt transitioned to eliquis   Continue meds, BB, AC -    68F hx left breast cancer (s/p XRT and lumpectomy; 2009), BioMVR with MAZE (Bovine) in 2013 at [facility], pAF (on Eliquis), presented to [facility]  STEWART, acute on chronic diastolic CHF exacerbation, with SARINA showing severe prosthetic mitral stenosis and severe TR, s/p LHC with no obstructive CAD noted. Patient ultimately transferred to [facility] under Dr. Jimenez for surgical evaluation. Cardiology consulted for management of     s/p BioMVR with  Severe mitral valve stenosis,   Pt was initally at [facility] with HF exacerbation, with SARINA showing Severe Prosthetic Mitral Stenosis, Moderate MR, and Severe TR.   S/p LHC with no obstructive CAD noted.   Patient ultimately transferred to [facility] under Dr. Jimenez for surgical evaluation.     CTS on the case completed preop LUGO  Cont GDMT  Pt is for D/C home with OP FU with CTS for OR planning     Acute HFrEF (heart failure with reduced ejection fraction).   according to pt, EF in [place]'s was 40%, s/p LHC showed non-obstructive CAD  TTE in [facility] EF 50-55%. likely EF is lower in setting of AFlutter with RVR   GDMT as tolerated    AF/Afl   H/o MAZE procedure in 2013, with recurrent PAF since.   now, s/p successful DCCV 1/8  heparin gtt transitioned to eliquis   Continue meds, BB, AC

## 2024-01-09 NOTE — PROGRESS NOTE ADULT - PROBLEM SELECTOR PLAN 6
eliquis and SCDs for DVT prophylaxis  Protonix for GI prophylaxis.
Eliquis and SCDs for DVT prophylaxis  Protonix for GI prophylaxis.
heparin gtt and SCDs for DVT prophylaxis  Protonix for GI prophylaxis.

## 2024-01-09 NOTE — PROGRESS NOTE ADULT - PROBLEM SELECTOR PROBLEM 2
Atrial flutter
Diastolic CHF, acute on chronic
Atrial flutter
Diastolic CHF, acute on chronic

## 2024-01-09 NOTE — PROGRESS NOTE ADULT - PROBLEM SELECTOR PROBLEM 4
Acute HFrEF (heart failure with reduced ejection fraction)
Abnormal TSH
Acute HFrEF (heart failure with reduced ejection fraction)
Abnormal TSH
Abnormal TSH

## 2024-01-09 NOTE — PROGRESS NOTE ADULT - PROBLEM SELECTOR PROBLEM 1
Severe mitral valve stenosis

## 2024-01-09 NOTE — DISCHARGE NOTE NURSING/CASE MANAGEMENT/SOCIAL WORK - NSDCFUADDAPPT_GEN_ALL_CORE_FT
1. Follow up with Dr. Jimenez ___  The cardiac surgery office is located at [facility], first floor. Take a left at the end of the lobby until the end of that pepe (past the elevator bank). Make a left and the office is on your right across from the elevators.    2. Follow up with cardiology and primary care doctor in 1-2 weeks.  1. Follow up with Dr. Jimenez ___  The cardiac surgery office is located at [facility], first floor. Take a left at the end of the lobby until the end of that pepe (past the elevator bank). Make a left and the office is on your right across from the elevators.    2. Follow up with cardiology and primary care doctor in 1-2 weeks.

## 2024-01-09 NOTE — PROGRESS NOTE ADULT - ASSESSMENT
68 year old female with a PMH of left breast cancer (s/p XRT and lumpectomy; 2009), BioMVR with MAZE (Bovine) in 2013 at [facility], pAF (on Eliquis), HTN, originally presented to [facility] with 3 weeks of worsening SOB and STEWART, acute on chronic diastolic CHF exacerbation, with SARINA showing severe prosthetic mitral stenosis and severe TR, s/p LHC with no obstructive CAD noted. Patient ultimately transferred to [facility] under Dr. Jimenez for surgical evaluation. Cardiology consulted for management of Afib/Flutter. Echo EF 55% La severely dilated, moderate to severe mitral stenosis, Moderate TR RSVP 55mm/gh    68 year old female with a PMH of left breast cancer (s/p XRT and lumpectomy; 2009), BioMVR with MAZE (Bovine) in 2013 at [facility], pAF (on Eliquis), HTN, originally presented to [facility] with 3 weeks of worsening SOB and STEWART, acute on chronic diastolic CHF exacerbation, with SARINA showing severe prosthetic mitral stenosis and severe TR, s/p LHC with no obstructive CAD noted. Patient ultimately transferred to [facility] under Dr. Jimenez for surgical evaluation. Cardiology consulted for management of Afib/Flutter. Echo EF 55% La severely dilated, moderate to severe mitral stenosis, Moderate TR RSVP 55mm/gh

## 2024-01-09 NOTE — PROGRESS NOTE ADULT - PROBLEM SELECTOR PLAN 1
Patient is euvolemic on exam  As discussed bedside with Nicko GUZMAN from CTS - plan to d.c home on current regimen  Ct Lasix 20mg po daily  Weight daily - increase weight of 2-3 pounds notify Dr. Schwartz (outpatient cardiologist)  2gm low salt diet/DASH diet  Follow up outpatient with Dr. Casey and Dr. Schwartz Patient is euvolemic on exam.  As discussed bedside with Nicko GUZMAN from [facility] - plan to d.c home on current regimen.  Ct Lasix 20mg po daily.  Weight daily - increase weight of 2-3 pounds notify Dr. Schwartz (outpatient cardiologist).  2gm low salt diet/DASH diet.  Surgical workup for MV replacement via re-sternotomy.  Follow up outpatient with Dr. Casey and Dr. Schwartz. Patient is euvolemic on exam.  As discussed bedside with Nicko GUZMAN from [facility] - plan to d.c home on current regimen.  Ct Lasix 20mg po daily.  Weight daily - increase weight of 2-3 pounds notify Dr. Schwartz (outpatient cardiologist).  2gm low salt diet/DASH diet.  Surgical workup for MV replacement via re-sternotomy.  Follow up outpatient with Dr. Casey and Dr. Schwartz.

## 2024-01-09 NOTE — PROGRESS NOTE ADULT - REASON FOR ADMISSION
Severe Bioprosthetic Mitral Stenosis

## 2024-01-09 NOTE — PROGRESS NOTE ADULT - PROBLEM SELECTOR PLAN 1
Originally presented to [facility] with 3 weeks of worsening SOB and STEWART, acute on chronic diastolic CHF exacerbation, with SARINA showing Severe Prosthetic Mitral Stenosis, Moderate MR, and Severe TR.   S/p LHC with no obstructive CAD noted.   Patient ultimately transferred to [facility] under Dr. Jimenez for surgical evaluation.     preop work up completed  s/p successful DCCV 1/8  heparin gtt converted to eliquis  Continue Lasix 20PO QD and Aldactone for diuresis as tolerated by renal function and SBP.  CTA chest performed at [place]. Patient had radiation to the Left chest and will be a reop.    repeat POCU 1/8 with improved effusions, no thora needed   home today with outpt f/u for OR planning   Plan to be discussed / reviewed with CT Surgery attending / team during AM rounds. Originally presented to [facility] with 3 weeks of worsening SOB and STEWART, acute on chronic diastolic CHF exacerbation, with SARINA showing Severe Prosthetic Mitral Stenosis, Moderate MR, and Severe TR.   S/p LHC with no obstructive CAD noted.   Patient ultimately transferred to [facility] under Dr. Jimenez for surgical evaluation.     preop work up completed  s/p successful DCCV 1/8  heparin gtt converted to eliquis  Continue Lasix 20PO QD and Aldactone for diuresis as tolerated by renal function and SBP.  CTA chest performed at [place]. Patient had radiation to the Left chest and will be a reop.    repeat POCU 1/8 with improved effusions, no thora needed   home today with outpt f/u for OR planning   Plan to be discussed / reviewed with CT Surgery attending / team during AM rounds.

## 2024-01-09 NOTE — PROGRESS NOTE ADULT - ASSESSMENT
68F PMHx of left breast cancer (s/p XRT and lumpectomy; 2009), BioMVR with MAZE (Bovine) in 2013 at [facility], pAF (on Eliquis), HTN, originally presented to [facility] with 3 weeks of worsening SOB and STEWART, acute on chronic diastolic CHF exacerbation, with SARINA showing severe prosthetic mitral stenosis and severe TR, s/p LHC with no obstructive CAD noted. Patient ultimately transferred to [facility] under Dr. Jimenez for surgical evaluation.    68F PMHx of left breast cancer (s/p XRT and lumpectomy; 2009), BioMVR with MAZE (Bovine) in 2013 at [facility], pAF (on Eliquis), HTN, originally presented to [facility] with 3 weeks of worsening SOB and STEWART, acute on chronic diastolic CHF exacerbation, with SARINA showing severe prosthetic mitral stenosis and severe TR, s/p LHC with no obstructive CAD noted. Patient ultimately transferred to [facility] under Dr. Jimenez for surgical evaluation.

## 2024-01-09 NOTE — PROGRESS NOTE ADULT - SUBJECTIVE AND OBJECTIVE BOX
Subjective: no complaints denies CP, palpitations, SOB, cough, fever, chills, itchiness/rash, diaphoresis, vision changes, HA, dizziness/lightheadedness, numbness/tingling, abd pain, N/V     T(C): 37 (01-09-24 @ 00:24), Max: 37.2 (01-08-24 @ 14:50)  HR: 73 (01-09-24 @ 00:24) (60 - 82)  BP: 97/61 (01-09-24 @ 00:24) (97/61 - 143/81)  RR: 18 (01-09-24 @ 00:24) (17 - 20)  SpO2: 95% (01-09-24 @ 00:24) (94% - 100%)    01-08    139  |  101  |  23.6<H>  ----------------------------<  89  3.9   |  23.0  |  0.90    Ca    8.9      08 Jan 2024 05:03  Mg     1.9     01-08                                 11.6   5.22  )-----------( 192      ( 08 Jan 2024 05:03 )             34.1   PT/INR - ( 08 Jan 2024 05:03 )   PT: 12.6 sec;   INR: 1.14 ratio    PTT - ( 08 Jan 2024 13:51 )  PTT:58.4 sec    I&O's Detail    07 Jan 2024 07:01  -  08 Jan 2024 07:00  --------------------------------------------------------  IN:    Oral Fluid: 420 mL  Total IN: 420 mL    OUT:    Voided (mL): 1900 mL  Total OUT: 1900 mL  Total NET: -1480 mL    08 Jan 2024 07:01  -  09 Jan 2024 01:41  --------------------------------------------------------  IN:  Total IN: 0 mL    OUT:    Voided (mL): 2570 mL  Total OUT: 2570 mL  Total NET: -2570 mL    Drug Dosing Weight  Height (cm): 172.7 (08 Jan 2024 14:50)  Weight (kg): 73.663 (08 Jan 2024 14:50)  BMI (kg/m2): 24.7 (08 Jan 2024 14:50)  BSA (m2): 1.87 (08 Jan 2024 14:50)    MEDICATIONS  (STANDING):  apixaban 5 milliGRAM(s) Oral <User Schedule>  atorvastatin 80 milliGRAM(s) Oral at bedtime  furosemide    Tablet 20 milliGRAM(s) Oral daily  metoprolol tartrate 50 milliGRAM(s) Oral every 8 hours  mupirocin 2% Ointment 1 Application(s) Both Nostrils every 12 hours  pantoprazole    Tablet 40 milliGRAM(s) Oral before breakfast  sodium chloride 0.9% lock flush 3 milliLiter(s) IV Push every 8 hours  spironolactone 25 milliGRAM(s) Oral daily    MEDICATIONS  (PRN):  acetaminophen     Tablet .. 650 milliGRAM(s) Oral every 6 hours PRN Mild Pain (1 - 3)    Physical Exam  Gen: NAD  Neuro: A&Ox3 non focal speech clear and intact   Pulm: decreased BS b/l bases   CV: S1S2 RRR  Abd: +BS soft NT ND   Extrem/MS: trace b/l LE edema, no cyanosis CHAPMAN  skin: no rashes WWP

## 2024-01-09 NOTE — PROGRESS NOTE ADULT - PROBLEM SELECTOR PLAN 5
H/o Left breast cancer (s/p XRT and lumpectomy; 2009).

## 2024-01-09 NOTE — DISCHARGE NOTE NURSING/CASE MANAGEMENT/SOCIAL WORK - NSDCPEFALRISK_GEN_ALL_CORE
For information on Fall & Injury Prevention, visit: [email or web address] OR  [email or web address] OR  https://www.cdc.gov/steadi/patient.html For information on Fall & Injury Prevention, visit: [email or web address] OR  [email or web address] OR  https://www.cdc.gov/steadi/patient.html

## 2024-01-09 NOTE — PROGRESS NOTE ADULT - PROVIDER SPECIALTY LIST ADULT
CT Surgery
Cardiology
Electrophysiology
Cardiology
Electrophysiology
CT Surgery
Cardiology
CT Surgery

## 2024-01-09 NOTE — CHART NOTE - NSCHARTNOTEFT_GEN_A_CORE
Surgical consult with patient and Dr. Jimenez and myself covered in depth details regarding surgical options for valve replacement. CTS offered transcatheter mitral valve replacement in the setting of previous bioprosthetic MVR via midline sternotomy, as re-sternotomy has inherent increased surgical risks. Patient still requesting open MV replacement, declining transcatheter option. Risks versus benefits thoroughly explained to patient. Will proceed with surgical workup for MV replacement via re-sternotomy.

## 2024-01-09 NOTE — PROGRESS NOTE ADULT - PROBLEM SELECTOR PLAN 4
EF in [place]'s was 40%, s/p LHC showed non-obstructive CAD  TTE in [facility] EF 50-55%. likely EF is lower in setting of AFlutter with RVR   Needs strict rate control , goal HR<100 - currently rate controlled 60 s/p CV  GDMT:   furosemide 20mg po daily  metoprolol tartrate 50 mg po q8   spironolactone 25 mg po daily  Patient is euvolemic on exam  As discussed bedside with Nicko GUZMAN from [facility] - plan to d.c home on current regimen  Ct Lasix 20mg po daily  Weight daily - increase weight of 2-3 pounds notify Dr. Schwartz (outpatient cardiologist)  2gm low salt diet/DASH diet  Follow up outpatient with Dr. Casey and Dr. Schwartz EF in [place]'s was 40%, s/p LHC showed non-obstructive CAD  TTE in [facility] EF 50-55%. likely EF is lower in setting of AFlutter with RVR   Needs strict rate control , goal HR<100 - currently rate controlled 60 s/p CV  GDMT:   furosemide 20mg po daily  metoprolol tartrate 50 mg po q8   spironolactone 25 mg po daily  Patient is euvolemic on exam  As discussed bedside with Nicko GUZMAN from [facility] - plan to d.c home on current regimen  Ct Lasix 20mg po daily  Weight daily - increase weight of 2-3 pounds notify Dr. Schwartz (outpatient cardiologist)  2gm low salt diet/DASH diet  Follow up outpatient with Dr. Casey and Dr. Schwartz EF in [place]'s was 40%, s/p LHC showed non-obstructive CAD  TTE in [facility] EF 50-55%. likely EF is lower in setting of AFlutter with RVR   Needs strict rate control , goal HR<100 - currently rate controlled 60 s/p CV  GDMT:   furosemide 20mg po daily  metoprolol tartrate 50 mg po q8   spironolactone 25 mg po daily  Add entresto 24/26mg po bid  Patient is euvolemic on exam  As discussed bedside with Nicko GUZMAN from [facility] - plan to d.c home on current regimen  Ct Lasix 20mg po daily  Weight daily - increase weight of 2-3 pounds notify Dr. Schwartz (outpatient cardiologist)  2gm low salt diet/DASH diet  Follow up outpatient with Dr. Casey and Dr. Schwartz EF in [place]'s was 40%, s/p LHC showed non-obstructive CAD  TTE in [facility] EF 50-55%. likely EF is lower in setting of AFlutter with RVR   Needs strict rate control , goal HR<100 - currently rate controlled 60 s/p CV  GDMT:   furosemide 20mg po daily  metoprolol tartrate 50 mg po q8   spironolactone 25 mg po daily  Add entresto 24/26mg po bid  Patient is euvolemic on exam  As discussed bedside with Nicko GUZMAN from [facility] - plan to d.c home on current regimen  Ct Lasix 20mg po daily  Weight daily - increase weight of 2-3 pounds notify Dr. Schwartz (outpatient cardiologist)  2gm low salt diet/DASH diet  Follow up outpatient with Dr. Casey and Dr. Schwartz EF in [place]'s was 40%, s/p LHC showed non-obstructive CAD  TTE in [facility] EF 50-55%. likely EF is lower in setting of AFlutter with RVR   Needs strict rate control , goal HR<100 - currently rate controlled 60 s/p CV  GDMT:   furosemide 20mg po daily  metoprolol tartrate 50 mg po q8   spironolactone 25 mg po daily  Recommending to add Entresto 24/26mg po bid - as discussed with CTS no ACE/ARB/Entresto pre op - reconsider after surgery.    Patient is euvolemic on exam  As discussed bedside with Nicko GUZMAN from [facility] - plan to d.c home on current regimen  Ct Lasix 20mg po daily  Weight daily - increase weight of 2-3 pounds notify Dr. Schwartz (outpatient cardiologist)  2gm low salt diet/DASH diet  Follow up outpatient with Dr. Casey and Dr. Schwartz EF in [place]'s was 40%, s/p LHC showed non-obstructive CAD  TTE in [facility] EF 50-55%. likely EF is lower in setting of AFlutter with RVR   Needs strict rate control , goal HR<100 - currently rate controlled 60 s/p CV  GDMT:   furosemide 20mg po daily  metoprolol tartrate 50 mg po q8   spironolactone 25 mg po daily  Recommending to add Entresto 24/26mg po bid - as discussed with CTS no ACE/ARB/Entresto pre op - reconsider after surgery.    Patient is euvolemic on exam  As discussed bedside with Nicko GUZMAN from [facility] - plan to d.c home on current regimen  Ct Lasix 20mg po daily  Weight daily - increase weight of 2-3 pounds notify Dr. Schwartz (outpatient cardiologist)  2gm low salt diet/DASH diet  Follow up outpatient with Dr. Casey and Dr. Schwartz

## 2024-01-09 NOTE — PROGRESS NOTE ADULT - SUBJECTIVE AND OBJECTIVE BOX
[facility]                                                         CARDIOLOGY AT [facility]                                                                  [address] [facility], [place] [address]                                                         Telephone: [phone]. Fax:[phone]                                                                             PROGRESS NOTE    Reason for follow up:  Aflutter/severe TR/HFrEF/severe MS.    Update:   Euvolemic on exam, on room air with no desats noted, S/P successful CV - remains in SR today.   CTS preparing to d.c patient as discussed bedside with Shira  Review of symptoms:   Cardiac:  No chest pain. No dyspnea. No palpitations.  Respiratory: no cough. No dyspnea  Gastrointestinal: No diarrhea. No abdominal pain. No bleeding.   Neuro: No focal neuro complaints.    Vitals:  T(C): 36.9 (01-09-24 @ 07:51), Max: 37.2 (01-08-24 @ 14:50)  HR: 56 (01-09-24 @ 07:51) (56 - 82)  BP: 135/82 (01-09-24 @ 07:51) (97/61 - 143/81)  RR: 18 (01-09-24 @ 07:51) (17 - 20)  SpO2: 98% (01-09-24 @ 07:51) (94% - 99%)  I&O's Summary    08 Jan 2024 07:01  -  09 Jan 2024 07:00  --------------------------------------------------------  IN: 0 mL / OUT: 2570 mL / NET: -2570 mL  Weight (kg): 73.663 (01-08 @ 14:50)    PHYSICAL EXAM:  Appearance: Comfortable. No acute distress  HEENT:  Atraumatic. Normocephalic.  Normal oral mucosa  Neurologic: A & O x 3, no gross focal deficits.  Cardiovascular: RRR S1 S2, No murmur, no rubs/gallops. No JVD  Respiratory: Lungs clear to auscultation, unlabored   Gastrointestinal:  Soft, Non-tender, + BS  Lower Extremities: 2+ Peripheral Pulses, No clubbing, cyanosis, or edema  Psychiatry: Patient is calm. No agitation.   Skin: warm and dry.    CURRENT CARDIAC MEDICATIONS:  furosemide    Tablet 20 milliGRAM(s) Oral daily  metoprolol tartrate 50 milliGRAM(s) Oral every 8 hours  spironolactone 25 milliGRAM(s) Oral daily    CURRENT OTHER MEDICATIONS:  acetaminophen     Tablet .. 650 milliGRAM(s) Oral every 6 hours PRN Mild Pain (1 - 3)  pantoprazole    Tablet 40 milliGRAM(s) Oral before breakfast  atorvastatin 80 milliGRAM(s) Oral at bedtime  apixaban 5 milliGRAM(s) Oral <User Schedule>  mupirocin 2% Ointment 1 Application(s) Both Nostrils every 12 hours, Stop order after: 10 Doses  sodium chloride 0.9% lock flush 3 milliLiter(s) IV Push every 8 hours    LABS:	 	  ( 05 Jan 2024 21:15 )  Troponin T  X    ,  CPK  39   , CKMB  X    , BNP X                              12.1   4.91  )-----------( 189      ( 09 Jan 2024 04:40 )             35.7     01-09    138  |  101  |  24.2<H>  ----------------------------<  98  4.9   |  26.0  |  0.95    Ca    9.5      09 Jan 2024 04:40  Mg     2.2     01-09      PT/INR/PTT ( 09 Jan 2024 04:40 )                       :                       :      X            :       32.2                  .        .                   .              .           .       X           .                                       Lipid Profile:   HgA1c:   TSH: Thyroid Stimulating Hormone, Serum: 8.01 uIU/mL  TELEMETRY:  SR 60's, no acute alarms, no                                                                   [facility]                                                         CARDIOLOGY AT [facility]                                                                  [address] [facility], [place] [address]                                                         Telephone: [phone]. Fax:[phone]                                                                             PROGRESS NOTE    Reason for follow up:  Aflutter/severe TR/HFrEF/severe MS.    Update:   Euvolemic on exam, on room air with no desats noted, S/P successful CV - remains in SR today.   CTS preparing to d.c patient as discussed bedside with Shira  Review of symptoms:   Cardiac:  No chest pain. No dyspnea. No palpitations.  Respiratory: no cough. No dyspnea  Gastrointestinal: No diarrhea. No abdominal pain. No bleeding.   Neuro: No focal neuro complaints.    Vitals:  T(C): 36.9 (01-09-24 @ 07:51), Max: 37.2 (01-08-24 @ 14:50)  HR: 56 (01-09-24 @ 07:51) (56 - 82)  BP: 135/82 (01-09-24 @ 07:51) (97/61 - 143/81)  RR: 18 (01-09-24 @ 07:51) (17 - 20)  SpO2: 98% (01-09-24 @ 07:51) (94% - 99%)  I&O's Summary    08 Jan 2024 07:01  -  09 Jan 2024 07:00  --------------------------------------------------------  IN: 0 mL / OUT: 2570 mL / NET: -2570 mL  Weight (kg): 73.663 (01-08 @ 14:50)    PHYSICAL EXAM:  Appearance: Comfortable. No acute distress  HEENT:  Atraumatic. Normocephalic.  Normal oral mucosa  Neurologic: A & O x 3, no gross focal deficits.  Cardiovascular: RRR S1 S2, No murmur, no rubs/gallops. No JVD  Respiratory: Lungs clear to auscultation, unlabored   Gastrointestinal:  Soft, Non-tender, + BS  Lower Extremities: 2+ Peripheral Pulses, No clubbing, cyanosis, or edema  Psychiatry: Patient is calm. No agitation.   Skin: warm and dry.    CURRENT CARDIAC MEDICATIONS:  furosemide    Tablet 20 milliGRAM(s) Oral daily  metoprolol tartrate 50 milliGRAM(s) Oral every 8 hours  spironolactone 25 milliGRAM(s) Oral daily    CURRENT OTHER MEDICATIONS:  acetaminophen     Tablet .. 650 milliGRAM(s) Oral every 6 hours PRN Mild Pain (1 - 3)  pantoprazole    Tablet 40 milliGRAM(s) Oral before breakfast  atorvastatin 80 milliGRAM(s) Oral at bedtime  apixaban 5 milliGRAM(s) Oral <User Schedule>  mupirocin 2% Ointment 1 Application(s) Both Nostrils every 12 hours, Stop order after: 10 Doses  sodium chloride 0.9% lock flush 3 milliLiter(s) IV Push every 8 hours    LABS:	 	  ( 05 Jan 2024 21:15 )  Troponin T  X    ,  CPK  39   , CKMB  X    , BNP X                              12.1   4.91  )-----------( 189      ( 09 Jan 2024 04:40 )             35.7     01-09    138  |  101  |  24.2<H>  ----------------------------<  98  4.9   |  26.0  |  0.95    Ca    9.5      09 Jan 2024 04:40  Mg     2.2     01-09      PT/INR/PTT ( 09 Jan 2024 04:40 )                       :                       :      X            :       32.2                  .        .                   .              .           .       X           .                                       Lipid Profile:   HgA1c:   TSH: Thyroid Stimulating Hormone, Serum: 8.01 uIU/mL  TELEMETRY:  SR 60's, no acute alarms, no                                                                   [facility]                                                         CARDIOLOGY AT [facility]                                                                  [address], [place] [address]                                                         Telephone: [phone]. Fax:[phone]                                                                             PROGRESS NOTE    Reason for follow up:  Aflutter/severe TR/HFrEF/severe MS.    Update:   Euvolemic on exam, on room air with no desats noted, S/P successful CV - remains in SR today.   CTS preparing to d.c patient as discussed bedside with Nicko  Review of symptoms:   Cardiac:  No chest pain. No dyspnea. No palpitations.  Respiratory: no cough. No dyspnea  Gastrointestinal: No diarrhea. No abdominal pain. No bleeding.   Neuro: No focal neuro complaints.    Vitals:  T(C): 36.9 (01-09-24 @ 07:51), Max: 37.2 (01-08-24 @ 14:50)  HR: 56 (01-09-24 @ 07:51) (56 - 82)  BP: 135/82 (01-09-24 @ 07:51) (97/61 - 143/81)  RR: 18 (01-09-24 @ 07:51) (17 - 20)  SpO2: 98% (01-09-24 @ 07:51) (94% - 99%)  I&O's Summary    08 Jan 2024 07:01  -  09 Jan 2024 07:00  --------------------------------------------------------  IN: 0 mL / OUT: 2570 mL / NET: -2570 mL  Weight (kg): 73.663 (01-08 @ 14:50)    PHYSICAL EXAM:  Appearance: Comfortable. No acute distress  HEENT:  Atraumatic. Normocephalic.  Normal oral mucosa  Neurologic: A & O x 3, no gross focal deficits.  Cardiovascular: RRR S1 S2, No murmur, no rubs/gallops. No JVD  Respiratory: Lungs clear to auscultation, unlabored   Gastrointestinal:  Soft, Non-tender, + BS  Lower Extremities: 2+ Peripheral Pulses, No clubbing, cyanosis, or edema  Psychiatry: Patient is calm. No agitation.   Skin: warm and dry.    CURRENT CARDIAC MEDICATIONS:  furosemide    Tablet 20 milliGRAM(s) Oral daily  metoprolol tartrate 50 milliGRAM(s) Oral every 8 hours  spironolactone 25 milliGRAM(s) Oral daily    CURRENT OTHER MEDICATIONS:  acetaminophen     Tablet .. 650 milliGRAM(s) Oral every 6 hours PRN Mild Pain (1 - 3)  pantoprazole    Tablet 40 milliGRAM(s) Oral before breakfast  atorvastatin 80 milliGRAM(s) Oral at bedtime  apixaban 5 milliGRAM(s) Oral <User Schedule>  mupirocin 2% Ointment 1 Application(s) Both Nostrils every 12 hours, Stop order after: 10 Doses  sodium chloride 0.9% lock flush 3 milliLiter(s) IV Push every 8 hours    LABS:	 	  ( 05 Jan 2024 21:15 )  Troponin T  X    ,  CPK  39   , CKMB  X    , BNP X                              12.1   4.91  )-----------( 189      ( 09 Jan 2024 04:40 )             35.7     01-09    138  |  101  |  24.2<H>  ----------------------------<  98  4.9   |  26.0  |  0.95    Ca    9.5      09 Jan 2024 04:40  Mg     2.2     01-09      PT/INR/PTT ( 09 Jan 2024 04:40 )                       :                       :      X            :       32.2                  .        .                   .              .           .       X           .                                       Lipid Profile:   HgA1c:   TSH: Thyroid Stimulating Hormone, Serum: 8.01 uIU/mL  TELEMETRY:  SR 60's, no acute alarms, no                                                                   [facility]                                                         CARDIOLOGY AT [facility]                                                                  [address], [place] [address]                                                         Telephone: [phone]. Fax:[phone]                                                                             PROGRESS NOTE    Reason for follow up:  Aflutter/severe TR/HFrEF/severe MS.    Update:   Euvolemic on exam, on room air with no desats noted, S/P successful CV - remains in SR today.   CTS preparing to d.c patient as discussed bedside with Nicko  Review of symptoms:   Cardiac:  No chest pain. No dyspnea. No palpitations.  Respiratory: no cough. No dyspnea  Gastrointestinal: No diarrhea. No abdominal pain. No bleeding.   Neuro: No focal neuro complaints.    Vitals:  T(C): 36.9 (01-09-24 @ 07:51), Max: 37.2 (01-08-24 @ 14:50)  HR: 56 (01-09-24 @ 07:51) (56 - 82)  BP: 135/82 (01-09-24 @ 07:51) (97/61 - 143/81)  RR: 18 (01-09-24 @ 07:51) (17 - 20)  SpO2: 98% (01-09-24 @ 07:51) (94% - 99%)  I&O's Summary    08 Jan 2024 07:01  -  09 Jan 2024 07:00  --------------------------------------------------------  IN: 0 mL / OUT: 2570 mL / NET: -2570 mL  Weight (kg): 73.663 (01-08 @ 14:50)    PHYSICAL EXAM:  Appearance: Comfortable. No acute distress  HEENT:  Atraumatic. Normocephalic.  Normal oral mucosa  Neurologic: A & O x 3, no gross focal deficits.  Cardiovascular: RRR S1 S2, No murmur, no rubs/gallops. No JVD  Respiratory: Lungs clear to auscultation, unlabored   Gastrointestinal:  Soft, Non-tender, + BS  Lower Extremities: 2+ Peripheral Pulses, No clubbing, cyanosis, or edema  Psychiatry: Patient is calm. No agitation.   Skin: warm and dry.    CURRENT CARDIAC MEDICATIONS:  furosemide    Tablet 20 milliGRAM(s) Oral daily  metoprolol tartrate 50 milliGRAM(s) Oral every 8 hours  spironolactone 25 milliGRAM(s) Oral daily    CURRENT OTHER MEDICATIONS:  acetaminophen     Tablet .. 650 milliGRAM(s) Oral every 6 hours PRN Mild Pain (1 - 3)  pantoprazole    Tablet 40 milliGRAM(s) Oral before breakfast  atorvastatin 80 milliGRAM(s) Oral at bedtime  apixaban 5 milliGRAM(s) Oral <User Schedule>  mupirocin 2% Ointment 1 Application(s) Both Nostrils every 12 hours, Stop order after: 10 Doses  sodium chloride 0.9% lock flush 3 milliLiter(s) IV Push every 8 hours    LABS:	 	  ( 05 Jan 2024 21:15 )  Troponin T  X    ,  CPK  39   , CKMB  X    , BNP X                              12.1   4.91  )-----------( 189      ( 09 Jan 2024 04:40 )             35.7     01-09    138  |  101  |  24.2<H>  ----------------------------<  98  4.9   |  26.0  |  0.95    Ca    9.5      09 Jan 2024 04:40  Mg     2.2     01-09      PT/INR/PTT ( 09 Jan 2024 04:40 )                       :                       :      X            :       32.2                  .        .                   .              .           .       X           .                                       Lipid Profile:   HgA1c:   TSH: Thyroid Stimulating Hormone, Serum: 8.01 uIU/mL  TELEMETRY:  SR 60's, no acute alarms, no

## 2024-01-09 NOTE — PROGRESS NOTE ADULT - PROBLEM SELECTOR PLAN 3
H/o MAZE procedure in 2013, with recurrent PAF since.   Patient takes Eliquis 5BID as an outpatient.   successful DCCV 1/8  heparin gtt transitioned to eliquis   Continue Lopressor as tolerated by HR and BP for HR control.

## 2024-01-09 NOTE — PROGRESS NOTE ADULT - PROBLEM SELECTOR PLAN 4
Elevated TSH of 8 on arrival.   TSH was 5.6 at [place] prior to transfer, with normal T4 and Thyroxine level. Elevated TSH of 8 on arrival.   TSH was 5.6 at [place] prior to transfer, with normal T4 and Thyroxine level.

## 2024-01-09 NOTE — PROGRESS NOTE ADULT - PROBLEM SELECTOR PLAN 2
Successful cardioversion yesterday  remains in SR today  Ct Eliquis  Ct lopressor 50mg po tid  EP following for further rec's

## 2024-01-09 NOTE — DISCHARGE NOTE NURSING/CASE MANAGEMENT/SOCIAL WORK - PATIENT PORTAL LINK FT
You can access the FollowMyHealth Patient Portal offered by [facility] by registering at the following website: [email or web address]. By joining [facility]’s FollowMyHealth portal, you will also be able to view your health information using other applications (apps) compatible with our system. You can access the FollowMyHealth Patient Portal offered by [facility] by registering at the following website: [email or web address]. By joining [facility]’s FollowMyHealth portal, you will also be able to view your health information using other applications (apps) compatible with our system.

## 2024-01-10 ENCOUNTER — TRANSCRIPTION ENCOUNTER (OUTPATIENT)
Age: 69
End: 2024-01-10

## 2024-01-12 ENCOUNTER — TRANSCRIPTION ENCOUNTER (OUTPATIENT)
Age: 69
End: 2024-01-12

## 2024-01-16 ENCOUNTER — APPOINTMENT (OUTPATIENT)
Dept: CARDIOTHORACIC SURGERY | Facility: CLINIC | Age: 69
End: 2024-01-16
Payer: MEDICARE

## 2024-01-16 VITALS
OXYGEN SATURATION: 99 % | BODY MASS INDEX: 23.49 KG/M2 | SYSTOLIC BLOOD PRESSURE: 118 MMHG | DIASTOLIC BLOOD PRESSURE: 72 MMHG | RESPIRATION RATE: 16 BRPM | WEIGHT: 155 LBS | HEIGHT: 68 IN | HEART RATE: 60 BPM

## 2024-01-16 DIAGNOSIS — Z86.79 PERSONAL HISTORY OF OTHER DISEASES OF THE CIRCULATORY SYSTEM: ICD-10-CM

## 2024-01-16 PROCEDURE — 99215 OFFICE O/P EST HI 40 MIN: CPT

## 2024-01-16 NOTE — HISTORY OF PRESENT ILLNESS
[FreeTextEntry1] : Ms. MULLINS is a 68-year-old female referred by Dr. Schwartz who presents for a follow up appointment regarding her prosthetic valvular disease. At our last office visit in November 2023, I recommended a valve in valve transcatheter mitral valve replacement (utilizing a TAVR valve) in conjunction with Dr. Giordano. 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 Patient ultimately transferred to [facility] for surgical evaluation. Cardiac Surgical Team offered transcatheter mitral valve replacement in the setting of previous bioprosthetic MVR via midline sternotomy, as re-sternotomy has inherent increased surgical risks. Patient still requesting open MV replacement, declining transcatheter option. Risks versus benefits thoroughly explained to patient. Will proceed with surgical workup for MV replacement via re-sternotomy.  Her past medical history includes HTN, HLD, breast cancer (lumpectomy with RT, 2009), Mitral valve replacement #29 Perimount Giovanna Jackson with MAZE ablation (April 2013 Dr Zhang [facility]), pAF (on Eliquis), HTN.  Evaluation for Re-operative mitral valve replacement tricuspid valve repair. She remains on diuretic therapy and feels improvement in some of her shortness of breath and edema. She remains with fatigue and mild shortness of breath. She denies any chest pain, palpitations, or syncope.

## 2024-01-16 NOTE — REVIEW OF SYSTEMS
[Feeling Poorly] : not feeling poorly [Feeling Tired] : feeling tired [Chest Pain] : no chest pain [Palpitations] : no palpitations [Lower Ext Edema] : lower extremity edema [Shortness Of Breath] : no shortness of breath [SOB on Exertion] : shortness of breath during exertion [Negative] : Heme/Lymph

## 2024-01-16 NOTE — DATA REVIEWED
[FreeTextEntry1] : TRANSESOPHAGEAL ECHOCARDIOGRAM REPORT 1/8/24: CONCLUSIONS:  1. Left ventricular systolic function is normal with an ejection fraction visually estimated at 50 to 55 %.  2. Mildly enlarged right ventricular cavity size and reduced systolic function.  3. No evidence of a patent foramen ovale by color flow Doppler.  4. Agitated saline injection was negative for intracardiac shunt.  5. Bioprosthetic valve present. in the mitral position. Degeneration of the mitral valve prosthesis. The prosthetic mitral valve appears to have pannus formation and has thickened leaflets. Transvalvular mitral gradients are elevated. Moderate-to-severe prosthetic mitral stenosis. There is centrally directed mild intravalvular mitral regurgitation. No paravalvular mitral regurgitation.  6. Moderate to severe tricuspid regurgitation. The tricuspid regurgitation is directed centrally.  7. No pericardial effusion seen.  8. No cardiac source of embolism was identified.  9. Moderate left pleural effusion noted. 10. Findings were discussed with patient on 1/8/2024. 11. Patient underwent successful restoration of sinus rhythm following the SARINA.    US DPLX CAROTIDS COMPL BI performed on 1/6/24: IMPRESSION: No significant hemodynamic stenosis of either carotid artery. Measurement of carotid stenosis is based on velocity parameters that  correlate the residual internal carotid diameter with that of the more  distal vessel in accordance with a method such as the North American  Symptomatic Carotid Endarterectomy Trial (NASCET).  --- End of Report ---  ALYSA FUNK MD; Attending Radiologist      Cardiac Catherization performed at [facility] 1/5/24: LM Left main artery - the segment is visually normal in size and structure. Angiography shows no disease  LAD Left anterior descending artery - angiography shows minor irregularities. First diagnonal - angiography shows minor irregularities  CX Circumflex - angiography shows minor irregularities. First obtuse marginal - angiography shows minor irregularities  RCA Right coronary artery- the segment is large, dominant. Angiography shows minor irregularities.      Transthoracic Echocardiogram performed at [facility] 1/5/24: -Left ventricular systolic function is low- normal -Severely dilated left atrium -Left atrial appendage is small, possibly partially occluded. No thrombus is identified -There is a bioprosthetic mitral valve, no more details available. There is moderate thickening of the prosthetic mitral valve leaflets, possible pannus formation. Prosthetic mitral valve leaflets restricted. Mild prosthesis regurgitation. Severe prosthesis stenosis, mean gradient across MV 12 mmHg at HR around 100bpm. -Trace aortic valve regurgitation -Moderate to severe tricuspid valve regurgitation   Transesophageal Echocardiogram from 06/21/23 at [facility] - Normal global LV systolic function - moderately dilated left atrium - mildly dilated right atrium - Moderate to severe mitral valve stenosis - The aortic valve is normal in structure and function.

## 2024-01-16 NOTE — PHYSICAL EXAM
[Sclera] : the sclera and conjunctiva were normal [Neck Appearance] : the appearance of the neck was normal [Respiration, Rhythm And Depth] : normal respiratory rhythm and effort [Auscultation Breath Sounds / Voice Sounds] : lungs were clear to auscultation bilaterally [Heart Rate And Rhythm] : heart rate was normal and rhythm regular [FreeTextEntry1] : NYHAC II  Grade 3 murmur [Examination Of The Chest] : the chest was normal in appearance [Abnormal Walk] : normal gait [Skin Color & Pigmentation] : normal skin color and pigmentation [Sensation] : the sensory exam was normal to light touch and pinprick [Oriented To Time, Place, And Person] : oriented to person, place, and time [Impaired Insight] : insight and judgment were intact

## 2024-01-16 NOTE — ASSESSMENT
[FreeTextEntry1] : Ms. MULLINS is a 68-year-old female referred by Dr. Schwartz who presents for a follow up appointment regarding her prosthetic valvular disease. Her past medical history includes HTN, HLD, breast cancer (lumpectomy with RT, 2009), Mitral valve replacement #29 Perimount Giovanna Jackson with MAZE ablation (April 2013 [facility]), pAF (on Eliquis), HTN.   She recently presented to [facility] with 3 weeks of worsening SOB and STEWART, acute on chronic diastolic CHF exacerbation, with SARINA showing severe prosthetic mitral stenosis and severe TR, s/p LHC with no obstructive CAD noted. Patient ultimately transferred to [facility] for surgical evaluation. Ultimately after imaging review re-operative sternotomy with Mitral Valve Replacement and Tricuspid Repair was discussed. Independent review of imaging and independent interpretation was performed at today's visit. She has severe mitral regurgitation as well as moderate to severe Tricuspid regurgitation as well.   The procedure, hospital stay and recovery was discussed in detail. All risks, benefits, and alternatives discussed at length with patient. All questions addressed. Patient would like to proceed with surgical intervention as discussed. This is a re-operative procedure and will incur a slightly higher risk of post operative complications such as bleeding. She is agreeable to proceed with risks discussed.   Preoperative checklist (Reviewed with patient in office) - Confirm allergies, including latex: NONE - Confirm pacemaker: NONE - Anticoagulation/antiplatelets noted and will be discontinued/continued: ELIQUIS (D/C 5 days) PREADMIT - SGLT-2 Inhibitors noted and will be discontinued 3 days prior to surgery: NONE   Testing: - Presurgical testing to be scheduled, which will include chest xray, electrocardiogram and standard labs   Surgical Plan: - Preadmit for Re-Operative Mitral valve replacement (bioprosthetic) with tricuspid valve repair            I, Dr. Jimenez personally performed the evaluation and management (E/M) services for this new patient. That E/M includes conducting the initial examination, assessing all conditions, and establishing the plan of care. Today, Dale Benson NP was here to observe my evaluation and management services for this patient.

## 2024-01-18 ENCOUNTER — RESULT REVIEW (OUTPATIENT)
Age: 69
End: 2024-01-18

## 2024-01-18 ENCOUNTER — OUTPATIENT (OUTPATIENT)
Dept: OUTPATIENT SERVICES | Facility: HOSPITAL | Age: 69
LOS: 1 days | End: 2024-01-18
Payer: MEDICARE

## 2024-01-18 VITALS
SYSTOLIC BLOOD PRESSURE: 110 MMHG | OXYGEN SATURATION: 98 % | HEIGHT: 68 IN | WEIGHT: 159.84 LBS | HEART RATE: 58 BPM | DIASTOLIC BLOOD PRESSURE: 70 MMHG | RESPIRATION RATE: 18 BRPM | TEMPERATURE: 98 F

## 2024-01-18 DIAGNOSIS — Z01.818 ENCOUNTER FOR OTHER PREPROCEDURAL EXAMINATION: ICD-10-CM

## 2024-01-18 DIAGNOSIS — Z98.890 OTHER SPECIFIED POSTPROCEDURAL STATES: Chronic | ICD-10-CM

## 2024-01-18 DIAGNOSIS — I07.1 RHEUMATIC TRICUSPID INSUFFICIENCY: ICD-10-CM

## 2024-01-18 DIAGNOSIS — Z95.2 PRESENCE OF PROSTHETIC HEART VALVE: ICD-10-CM

## 2024-01-18 DIAGNOSIS — I48.0 PAROXYSMAL ATRIAL FIBRILLATION: ICD-10-CM

## 2024-01-18 DIAGNOSIS — Z95.2 PRESENCE OF PROSTHETIC HEART VALVE: Chronic | ICD-10-CM

## 2024-01-18 DIAGNOSIS — Z91.89 OTHER SPECIFIED PERSONAL RISK FACTORS, NOT ELSEWHERE CLASSIFIED: ICD-10-CM

## 2024-01-18 DIAGNOSIS — I34.0 NONRHEUMATIC MITRAL (VALVE) INSUFFICIENCY: ICD-10-CM

## 2024-01-18 LAB
A1C WITH ESTIMATED AVERAGE GLUCOSE RESULT: 5.6 % — SIGNIFICANT CHANGE UP (ref 4–5.6)
ALBUMIN SERPL ELPH-MCNC: 4.7 G/DL — SIGNIFICANT CHANGE UP (ref 3.3–5.2)
ALP SERPL-CCNC: 90 U/L — SIGNIFICANT CHANGE UP (ref 40–120)
ALT FLD-CCNC: 27 U/L — SIGNIFICANT CHANGE UP
ANION GAP SERPL CALC-SCNC: 13 MMOL/L — SIGNIFICANT CHANGE UP (ref 5–17)
APPEARANCE UR: CLEAR — SIGNIFICANT CHANGE UP
APTT BLD: 33.3 SEC — SIGNIFICANT CHANGE UP (ref 24.5–35.6)
AST SERPL-CCNC: 29 U/L — SIGNIFICANT CHANGE UP
BASOPHILS # BLD AUTO: 0.03 K/UL — SIGNIFICANT CHANGE UP (ref 0–0.2)
BASOPHILS NFR BLD AUTO: 0.5 % — SIGNIFICANT CHANGE UP (ref 0–2)
BILIRUB SERPL-MCNC: 0.5 MG/DL — SIGNIFICANT CHANGE UP (ref 0.4–2)
BILIRUB UR-MCNC: NEGATIVE — SIGNIFICANT CHANGE UP
BLD GP AB SCN SERPL QL: SIGNIFICANT CHANGE UP
BUN SERPL-MCNC: 21.9 MG/DL — HIGH (ref 8–20)
CALCIUM SERPL-MCNC: 9.9 MG/DL — SIGNIFICANT CHANGE UP (ref 8.4–10.5)
CHLORIDE SERPL-SCNC: 98 MMOL/L — SIGNIFICANT CHANGE UP (ref 96–108)
CO2 SERPL-SCNC: 27 MMOL/L — SIGNIFICANT CHANGE UP (ref 22–29)
COLOR SPEC: YELLOW — SIGNIFICANT CHANGE UP
CREAT SERPL-MCNC: 1.02 MG/DL — SIGNIFICANT CHANGE UP (ref 0.5–1.3)
DIFF PNL FLD: NEGATIVE — SIGNIFICANT CHANGE UP
EGFR: 61 ML/MIN/1.73M2 — SIGNIFICANT CHANGE UP
EOSINOPHIL # BLD AUTO: 0.15 K/UL — SIGNIFICANT CHANGE UP (ref 0–0.5)
EOSINOPHIL NFR BLD AUTO: 2.3 % — SIGNIFICANT CHANGE UP (ref 0–6)
ESTIMATED AVERAGE GLUCOSE: 114 MG/DL — SIGNIFICANT CHANGE UP (ref 68–114)
GLUCOSE SERPL-MCNC: 101 MG/DL — HIGH (ref 70–99)
GLUCOSE UR QL: NEGATIVE MG/DL — SIGNIFICANT CHANGE UP
HCT VFR BLD CALC: 41.9 % — SIGNIFICANT CHANGE UP (ref 34.5–45)
HGB BLD-MCNC: 13.5 G/DL — SIGNIFICANT CHANGE UP (ref 11.5–15.5)
IMM GRANULOCYTES NFR BLD AUTO: 0.3 % — SIGNIFICANT CHANGE UP (ref 0–0.9)
INR BLD: 1.24 RATIO — HIGH (ref 0.85–1.18)
KETONES UR-MCNC: NEGATIVE MG/DL — SIGNIFICANT CHANGE UP
LEUKOCYTE ESTERASE UR-ACNC: NEGATIVE — SIGNIFICANT CHANGE UP
LYMPHOCYTES # BLD AUTO: 1.02 K/UL — SIGNIFICANT CHANGE UP (ref 1–3.3)
LYMPHOCYTES # BLD AUTO: 15.7 % — SIGNIFICANT CHANGE UP (ref 13–44)
MAGNESIUM SERPL-MCNC: 1.9 MG/DL — SIGNIFICANT CHANGE UP (ref 1.6–2.6)
MCHC RBC-ENTMCNC: 30.8 PG — SIGNIFICANT CHANGE UP (ref 27–34)
MCHC RBC-ENTMCNC: 32.2 GM/DL — SIGNIFICANT CHANGE UP (ref 32–36)
MCV RBC AUTO: 95.4 FL — SIGNIFICANT CHANGE UP (ref 80–100)
MONOCYTES # BLD AUTO: 0.54 K/UL — SIGNIFICANT CHANGE UP (ref 0–0.9)
MONOCYTES NFR BLD AUTO: 8.3 % — SIGNIFICANT CHANGE UP (ref 2–14)
MRSA PCR RESULT.: SIGNIFICANT CHANGE UP
NEUTROPHILS # BLD AUTO: 4.73 K/UL — SIGNIFICANT CHANGE UP (ref 1.8–7.4)
NEUTROPHILS NFR BLD AUTO: 72.9 % — SIGNIFICANT CHANGE UP (ref 43–77)
NITRITE UR-MCNC: NEGATIVE — SIGNIFICANT CHANGE UP
NT-PROBNP SERPL-SCNC: 1514 PG/ML — HIGH (ref 0–300)
PH UR: 6.5 — SIGNIFICANT CHANGE UP (ref 5–8)
PLATELET # BLD AUTO: 263 K/UL — SIGNIFICANT CHANGE UP (ref 150–400)
POTASSIUM SERPL-MCNC: 4.5 MMOL/L — SIGNIFICANT CHANGE UP (ref 3.5–5.3)
POTASSIUM SERPL-SCNC: 4.5 MMOL/L — SIGNIFICANT CHANGE UP (ref 3.5–5.3)
PREALB SERPL-MCNC: 29 MG/DL — SIGNIFICANT CHANGE UP (ref 18–38)
PROT SERPL-MCNC: 7.6 G/DL — SIGNIFICANT CHANGE UP (ref 6.6–8.7)
PROT UR-MCNC: NEGATIVE MG/DL — SIGNIFICANT CHANGE UP
PROTHROM AB SERPL-ACNC: 13.7 SEC — HIGH (ref 9.5–13)
RBC # BLD: 4.39 M/UL — SIGNIFICANT CHANGE UP (ref 3.8–5.2)
RBC # FLD: 12.9 % — SIGNIFICANT CHANGE UP (ref 10.3–14.5)
S AUREUS DNA NOSE QL NAA+PROBE: SIGNIFICANT CHANGE UP
SODIUM SERPL-SCNC: 137 MMOL/L — SIGNIFICANT CHANGE UP (ref 135–145)
SP GR SPEC: 1.01 — SIGNIFICANT CHANGE UP (ref 1–1.03)
T3 SERPL-MCNC: 142 NG/DL — SIGNIFICANT CHANGE UP (ref 80–200)
T4 AB SER-ACNC: 6.1 UG/DL — SIGNIFICANT CHANGE UP (ref 4.5–12)
TSH SERPL-MCNC: 7.61 UIU/ML — HIGH (ref 0.27–4.2)
UROBILINOGEN FLD QL: 0.2 MG/DL — SIGNIFICANT CHANGE UP (ref 0.2–1)
WBC # BLD: 6.49 K/UL — SIGNIFICANT CHANGE UP (ref 3.8–10.5)
WBC # FLD AUTO: 6.49 K/UL — SIGNIFICANT CHANGE UP (ref 3.8–10.5)

## 2024-01-18 PROCEDURE — G0463: CPT

## 2024-01-18 PROCEDURE — 93005 ELECTROCARDIOGRAM TRACING: CPT

## 2024-01-18 PROCEDURE — 71046 X-RAY EXAM CHEST 2 VIEWS: CPT | Mod: 26

## 2024-01-18 PROCEDURE — 93010 ELECTROCARDIOGRAM REPORT: CPT

## 2024-01-18 PROCEDURE — 71046 X-RAY EXAM CHEST 2 VIEWS: CPT

## 2024-01-18 NOTE — H&P PST ADULT - HISTORY OF PRESENT ILLNESS
69 y/o female with PMH of HTN, HLD, left breast cancer (s/p XRT and lumpectomy; 2009), pAF (on Eliquis) and Mitral valve stenosis s/p Mitral valve replacement #29 Perimount Giovanna Jackson with MAZE ablation (April 2013 Dr Zhang [facility]) presents to PST with complaints of shortness of breath.     Patient is scheduled for mitral valve repair, possible replace, tricuspid valve replacement with Dr. Jimneez on 1/31/23, to be pre- admitted on 1/30/24.       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 Patient ultimately transferred to [facility] for surgical evaluation. Cardiac Surgical Team offered transcatheter mitral valve replacement in the setting of previous bioprosthetic MVR via midline sternotomy, as re-sternotomy has inherent increased surgical risks. Patient still requesting open MV replacement, declining transcatheter option. Risks versus benefits thoroughly explained to patient. Will proceed with surgical workup for MV replacement via re-sternotomy. 69 y/o female with PMH of HTN, HLD, left breast cancer (s/p XRT and lumpectomy; 2009), pAF (on Eliquis) and Mitral valve stenosis s/p Mitral valve replacement #29 Perimount Giovanna Jackson with MAZE ablation (April 2013 Dr Zhang [facility]) presents to PST with complaints of worsening shortness of breath. States in 06/2023 she started to notice worsening shortness of breath and STEWART. She has had 2 episodes of atrial fibrillation with cardioversions since 06/2023 with most recent cardioversion 01/2024.   Denies chest pain, palpitations, dizziness, lightheadedness or near syncope. Patient is scheduled for mitral valve repair, possible replace, tricuspid valve replacement with Dr. Jimenez on 1/31/23, to be pre- admitted on 1/30/24.       However, she recently presented to [facility] with 3 weeks of worsening SOB and STEWART, acute on chronic diastolic CHF exacerbation, with SARINA showing severe prosthetic mitral stenosis and severe TR, s/p LHC with no obstructive CAD noted. Patient ultimately transferred to [facility] for surgical evaluation. Cardiac Surgical Team offered transcatheter mitral valve replacement in the setting of previous bioprosthetic MVR via midline sternotomy, as re-sternotomy has inherent increased surgical risks. Patient still requesting open MV replacement, declining transcatheter option. Risks versus benefits thoroughly explained to patient. Will proceed with surgical workup for MV replacement via re-sternotomy. 67 y/o female with PMH of HTN, HLD, left breast cancer (s/p XRT and lumpectomy; 2009), pAF (on Eliquis) and Mitral valve stenosis s/p Mitral valve replacement #29 Perimount Giovanna Jackson with MAZE ablation (April 2013 Dr Zhang [facility]) presents to PST with complaints of worsening shortness of breath. States in 06/2023 she started to notice worsening shortness of breath and STEWART which has gotten progressively worse since. She has also had 2 episodes of atrial fibrillation with cardioversions since 06/2023 with most recent cardioversion 01/2024. She was recently admitted to [facility] with complaints of 3 weeks of worsening SOB and STEWART, acute on chronic diastolic CHF exacerbation, with SARINA showing severe prosthetic mitral stenosis and severe TR, s/p LHC with no obstructive CAD noted. Denies chest pain, palpitations, dizziness, lightheadedness or near syncope. Patient is scheduled for mitral valve repair, possible replace, tricuspid valve replacement with Dr. Jimenez on 1/31/23, to be pre- admitted on 1/30/24.

## 2024-01-18 NOTE — H&P PST ADULT - NSICDXPASTMEDICALHX_GEN_ALL_CORE_FT
PAST MEDICAL HISTORY:  Afib     Breast cancer left; s/p lumpectomy and XRT    HLD (hyperlipidemia)     HTN (hypertension)     Mitral valve insufficiency     Tricuspid valve insufficiency

## 2024-01-18 NOTE — H&P PST ADULT - ASSESSMENT
Patient educated on surgical scrub, preadmission instructions and day of procedure medications, verbalizes understanding. Pt instructed to stop vitamins/supplements/herbal medications/ASA/NSAIDS for one week prior to surgery and discuss with PMD. As per Dr. Jimenez discontinue eliquis 5 days prior to procedure.    CAPRINI SCORE    AGE RELATED RISK FACTORS                                                             [ ] Age 41-60 years                                            (1 Point)  [ ] Age: 61-74 years                                           (2 Points)                 [ ] Age= 75 years                                                (3 Points)             DISEASE RELATED RISK FACTORS                                                       [ ] Edema in the lower extremities                 (1 Point)                     [ ] Varicose veins                                               (1 Point)                                 [ ] BMI > 25 Kg/m2                                            (1 Point)                                  [ ] Serious infection (ie PNA)                            (1 Point)                     [ ] Lung disease ( COPD, Emphysema)            (1 Point)                                                                          [ ] Acute myocardial infarction                         (1 Point)                  [ ] Congestive heart failure (in the previous month)  (1 Point)         [ ] Inflammatory bowel disease                            (1 Point)                  [ ] Central venous access, PICC or Port               (2 points)       (within the last month)                                                                [ ] Stroke (in the previous month)                        (5 Points)    [ ] Previous or present malignancy                       (2 points)                                                                                                                                                         HEMATOLOGY RELATED FACTORS                                                         [ ] Prior episodes of VTE                                     (3 Points)                     [ ] Positive family history for VTE                      (3 Points)                  [ ] Prothrombin 27231 A                                     (3 Points)                     [ ] Factor V Leiden                                                (3 Points)                        [ ] Lupus anticoagulants                                      (3 Points)                                                           [ ] Anticardiolipin antibodies                              (3 Points)                                                       [ ] High homocysteine in the blood                   (3 Points)                                             [ ] Other congenital or acquired thrombophilia      (3 Points)                                                [ ] Heparin induced thrombocytopenia                  (3 Points)                                        MOBILITY RELATED FACTORS  [ ] Bed rest                                                         (1 Point)  [ ] Plaster cast                                                    (2 points)  [ ] Bed bound for more than 72 hours           (2 Points)    GENDER SPECIFIC FACTORS  [ ] Pregnancy or had a baby within the last month   (1 Point)  [ ] Post-partum < 6 weeks                                   (1 Point)  [ ] Hormonal therapy  or oral contraception   (1 Point)  [ ] History of pregnancy complications              (1 point)  [ ] Unexplained or recurrent              (1 Point)    OTHER RISK FACTORS                                           (1 Point)  [ ] BMI >40, smoking, diabetes requiring insulin, chemotherapy  blood transfusions and length of surgery over 2 hours    SURGERY RELATED RISK FACTORS  [ ]  Section within the last month     (1 Point)  [ ] Minor surgery                                                  (1 Point)  [ ] Arthroscopic surgery                                       (2 Points)  [ ] Planned major surgery lasting more            (2 Points)      than 45 minutes     [ ] Elective hip or knee joint replacement       (5 points)       surgery                                                TRAUMA RELATED RISK FACTORS  [ ] Fracture of the hip, pelvis, or leg                       (5 Points)  [ ] Spinal cord injury resulting in paralysis             (5 points)       (in the previous month)    [ ] Paralysis  (less than 1 month)                             (5 Points)  [ ] Multiple Trauma within 1 month                        (5 Points)    Total Score [        ]    Caprini Score 0-2: Low Risk, NO VTE prophylaxis required for most patients, encourage ambulation  Caprini Score 3-6: Moderate Risk , pharmacologic VTE prophylaxis is indicated for most patients (in the absence of contraindications)  Caprini Score Greater than or =7: High risk, pharmocologic VTE prophylaxis indicated for most patients (in the absence of contraindications)    OPIOID RISK TOOL    OLIVIA EACH BOX THAT APPLIES AND ADD TOTALS AT THE END    FAMILY HISTORY OF SUBSTANCE ABUSE                 FEMALE         MALE                                                Alcohol                             [  ]1 pt          [  ]3pts                                               Illegal Durgs                     [  ]2 pts        [  ]3pts                                               Rx Drugs                           [  ]4 pts        [  ]4 pts    PERSONAL HISTORY OF SUBSTANCE ABUSE                                                                                          Alcohol                             [  ]3 pts       [  ]3 pts                                               Illegal Drugs                     [  ]4 pts        [  ]4 pts                                               Rx Drugs                           [  ]5 pts        [  ]5 pts    AGE BETWEEN 16-45 YEARS                                      [  ]1 pt         [  ]1 pt    HISTORY OF PREADOLESCENT   SEXUAL ABUSE                                                             [  ]3 pts        [  ]0pts    PSYCHOLOGICAL DISEASE                     ADD, OCD, Bipolar, Schizophrenia        [  ]2 pts         [  ]2 pts                      Depression                                               [  ]1 pt           [  ]1 pt           SCORING TOTAL   (add numbers and type here)              (***)                                     A score of 3 or lower indicated LOW risk for future opioid abuse  A score of 4 to 7 indicated moderate risk for future opioid abuse  A score of 8 or higher indicates a high risk for opioid abuse     Patient educated on surgical scrub, preadmission instructions and day of procedure medications, verbalizes understanding. Pt instructed to stop vitamins/supplements/herbal medications/ASA/NSAIDS for one week prior to surgery and discuss with PMD. As per Dr. Jimenez discontinue eliquis 5 days prior to procedure.    CAPRINI SCORE    AGE RELATED RISK FACTORS                                                             [ ] Age 41-60 years                                            (1 Point)  [x ] Age: 61-74 years                                           (2 Points)                 [ ] Age= 75 years                                                (3 Points)             DISEASE RELATED RISK FACTORS                                                       [ ] Edema in the lower extremities                 (1 Point)                     [ ] Varicose veins                                               (1 Point)                                 [ ] BMI > 25 Kg/m2                                            (1 Point)                                  [ ] Serious infection (ie PNA)                            (1 Point)                     [ ] Lung disease ( COPD, Emphysema)            (1 Point)                                                                          [ ] Acute myocardial infarction                         (1 Point)                  [ ] Congestive heart failure (in the previous month)  (1 Point)         [ ] Inflammatory bowel disease                            (1 Point)                  [ ] Central venous access, PICC or Port               (2 points)       (within the last month)                                                                [ ] Stroke (in the previous month)                        (5 Points)    [x ] Previous or present malignancy                       (2 points)                                                                                                                                                         HEMATOLOGY RELATED FACTORS                                                         [ ] Prior episodes of VTE                                     (3 Points)                     [ ] Positive family history for VTE                      (3 Points)                  [ ] Prothrombin 50997 A                                     (3 Points)                     [ ] Factor V Leiden                                                (3 Points)                        [ ] Lupus anticoagulants                                      (3 Points)                                                           [ ] Anticardiolipin antibodies                              (3 Points)                                                       [ ] High homocysteine in the blood                   (3 Points)                                             [ ] Other congenital or acquired thrombophilia      (3 Points)                                                [ ] Heparin induced thrombocytopenia                  (3 Points)                                        MOBILITY RELATED FACTORS  [ ] Bed rest                                                         (1 Point)  [ ] Plaster cast                                                    (2 points)  [ ] Bed bound for more than 72 hours           (2 Points)    GENDER SPECIFIC FACTORS  [ ] Pregnancy or had a baby within the last month   (1 Point)  [ ] Post-partum < 6 weeks                                   (1 Point)  [ ] Hormonal therapy  or oral contraception   (1 Point)  [ ] History of pregnancy complications              (1 point)  [ ] Unexplained or recurrent              (1 Point)    OTHER RISK FACTORS                                           (1 Point)  [x ] BMI >40, smoking, diabetes requiring insulin, chemotherapy  blood transfusions and length of surgery over 2 hours    SURGERY RELATED RISK FACTORS  [ ]  Section within the last month     (1 Point)  [ ] Minor surgery                                                  (1 Point)  [ ] Arthroscopic surgery                                       (2 Points)  [x ] Planned major surgery lasting more            (2 Points)      than 45 minutes     [ ] Elective hip or knee joint replacement       (5 points)       surgery                                                TRAUMA RELATED RISK FACTORS  [ ] Fracture of the hip, pelvis, or leg                       (5 Points)  [ ] Spinal cord injury resulting in paralysis             (5 points)       (in the previous month)    [ ] Paralysis  (less than 1 month)                             (5 Points)  [ ] Multiple Trauma within 1 month                        (5 Points)    Total Score [  7    ]    Caprini Score 0-2: Low Risk, NO VTE prophylaxis required for most patients, encourage ambulation  Caprini Score 3-6: Moderate Risk , pharmacologic VTE prophylaxis is indicated for most patients (in the absence of contraindications)  Caprini Score Greater than or =7: High risk, pharmocologic VTE prophylaxis indicated for most patients (in the absence of contraindications)    OPIOID RISK TOOL    OLIVIA EACH BOX THAT APPLIES AND ADD TOTALS AT THE END    FAMILY HISTORY OF SUBSTANCE ABUSE                 FEMALE         MALE                                                Alcohol                             [  ]1 pt          [  ]3pts                                               Illegal Durgs                     [  ]2 pts        [  ]3pts                                               Rx Drugs                           [  ]4 pts        [  ]4 pts    PERSONAL HISTORY OF SUBSTANCE ABUSE                                                                                          Alcohol                             [  ]3 pts       [  ]3 pts                                               Illegal Drugs                     [  ]4 pts        [  ]4 pts                                               Rx Drugs                           [  ]5 pts        [  ]5 pts    AGE BETWEEN 16-45 YEARS                                      [  ]1 pt         [  ]1 pt    HISTORY OF PREADOLESCENT   SEXUAL ABUSE                                                             [  ]3 pts        [  ]0pts    PSYCHOLOGICAL DISEASE                     ADD, OCD, Bipolar, Schizophrenia        [  ]2 pts         [  ]2 pts                      Depression                                               [  ]1 pt           [  ]1 pt           SCORING TOTAL   (add numbers and type here)              (*0**)                                     A score of 3 or lower indicated LOW risk for future opioid abuse  A score of 4 to 7 indicated moderate risk for future opioid abuse  A score of 8 or higher indicates a high risk for opioid abuse   67 y/o female with PMH of HTN, HLD, left breast cancer (s/p XRT and lumpectomy; ), pAF (on Eliquis) and Mitral valve stenosis s/p Mitral valve replacement #29 [facility] with MAZE ablation (2013 Dr Zhang [facility]) presents to PST with complaints of worsening shortness of breath. States in 2023 she started to notice worsening shortness of breath and STEWART which has gotten progressively worse since. She has also had 2 episodes of atrial fibrillation with cardioversions since 2023 with most recent cardioversion 2024. She was recently admitted to [facility] with complaints of 3 weeks of worsening SOB and STEWART, acute on chronic diastolic CHF exacerbation, with SARINA showing severe prosthetic mitral stenosis and severe TR, s/p LHC with no obstructive CAD noted. Denies chest pain, palpitations, dizziness, lightheadedness or near syncope. Patient is scheduled for mitral valve repair, possible replace, tricuspid valve replacement with Dr. Jimenez on 23, to be pre- admitted on 24. Patient educated on surgical scrub, preadmission instructions and day of procedure medications, verbalizes understanding. Pt instructed to stop vitamins/supplements/herbal medications/NSAIDS for one week prior to surgery and discuss with PMD. As per Dr. Jimenez discontinue Eliquis 5 days prior to procedure.    CAPRINI SCORE    AGE RELATED RISK FACTORS                                                             [ ] Age 41-60 years                                            (1 Point)  [x ] Age: 61-74 years                                           (2 Points)                 [ ] Age= 75 years                                                (3 Points)             DISEASE RELATED RISK FACTORS                                                       [ ] Edema in the lower extremities                 (1 Point)                     [ ] Varicose veins                                               (1 Point)                                 [ ] BMI > 25 Kg/m2                                            (1 Point)                                  [ ] Serious infection (ie PNA)                            (1 Point)                     [ ] Lung disease ( COPD, Emphysema)            (1 Point)                                                                          [ ] Acute myocardial infarction                         (1 Point)                  [ ] Congestive heart failure (in the previous month)  (1 Point)         [ ] Inflammatory bowel disease                            (1 Point)                  [ ] Central venous access, PICC or Port               (2 points)       (within the last month)                                                                [ ] Stroke (in the previous month)                        (5 Points)    [x ] Previous or present malignancy                       (2 points)                                                                                                                                                         HEMATOLOGY RELATED FACTORS                                                         [ ] Prior episodes of VTE                                     (3 Points)                     [ ] Positive family history for VTE                      (3 Points)                  [ ] Prothrombin 95180 A                                     (3 Points)                     [ ] Factor V Leiden                                                (3 Points)                        [ ] Lupus anticoagulants                                      (3 Points)                                                           [ ] Anticardiolipin antibodies                              (3 Points)                                                       [ ] High homocysteine in the blood                   (3 Points)                                             [ ] Other congenital or acquired thrombophilia      (3 Points)                                                [ ] Heparin induced thrombocytopenia                  (3 Points)                                        MOBILITY RELATED FACTORS  [ ] Bed rest                                                         (1 Point)  [ ] Plaster cast                                                    (2 points)  [ ] Bed bound for more than 72 hours           (2 Points)    GENDER SPECIFIC FACTORS  [ ] Pregnancy or had a baby within the last month   (1 Point)  [ ] Post-partum < 6 weeks                                   (1 Point)  [ ] Hormonal therapy  or oral contraception   (1 Point)  [ ] History of pregnancy complications              (1 point)  [ ] Unexplained or recurrent              (1 Point)    OTHER RISK FACTORS                                           (1 Point)  [x ] BMI >40, smoking, diabetes requiring insulin, chemotherapy  blood transfusions and length of surgery over 2 hours    SURGERY RELATED RISK FACTORS  [ ]  Section within the last month     (1 Point)  [ ] Minor surgery                                                  (1 Point)  [ ] Arthroscopic surgery                                       (2 Points)  [x ] Planned major surgery lasting more            (2 Points)      than 45 minutes     [ ] Elective hip or knee joint replacement       (5 points)       surgery                                                TRAUMA RELATED RISK FACTORS  [ ] Fracture of the hip, pelvis, or leg                       (5 Points)  [ ] Spinal cord injury resulting in paralysis             (5 points)       (in the previous month)    [ ] Paralysis  (less than 1 month)                             (5 Points)  [ ] Multiple Trauma within 1 month                        (5 Points)    Total Score [  7    ]    Caprini Score 0-2: Low Risk, NO VTE prophylaxis required for most patients, encourage ambulation  Caprini Score 3-6: Moderate Risk , pharmacologic VTE prophylaxis is indicated for most patients (in the absence of contraindications)  Caprini Score Greater than or =7: High risk, pharmocologic VTE prophylaxis indicated for most patients (in the absence of contraindications)    OPIOID RISK TOOL    OLIVIA EACH BOX THAT APPLIES AND ADD TOTALS AT THE END    FAMILY HISTORY OF SUBSTANCE ABUSE                 FEMALE         MALE                                                Alcohol                             [  ]1 pt          [  ]3pts                                               Illegal Durgs                     [  ]2 pts        [  ]3pts                                               Rx Drugs                           [  ]4 pts        [  ]4 pts    PERSONAL HISTORY OF SUBSTANCE ABUSE                                                                                          Alcohol                             [  ]3 pts       [  ]3 pts                                               Illegal Drugs                     [  ]4 pts        [  ]4 pts                                               Rx Drugs                           [  ]5 pts        [  ]5 pts    AGE BETWEEN 16-45 YEARS                                      [  ]1 pt         [  ]1 pt    HISTORY OF PREADOLESCENT   SEXUAL ABUSE                                                             [  ]3 pts        [  ]0pts    PSYCHOLOGICAL DISEASE                     ADD, OCD, Bipolar, Schizophrenia        [  ]2 pts         [  ]2 pts                      Depression                                               [  ]1 pt           [  ]1 pt           SCORING TOTAL   (add numbers and type here)              (*0**)                                     A score of 3 or lower indicated LOW risk for future opioid abuse  A score of 4 to 7 indicated moderate risk for future opioid abuse  A score of 8 or higher indicates a high risk for opioid abuse

## 2024-01-18 NOTE — H&P PST ADULT - PROBLEM SELECTOR PLAN 1
Patient is scheduled for mitral valve repair, possible replace, tricuspid valve replacement with Dr. Jimenez on 1/31/23, to be pre- admitted on 1/30/24.

## 2024-01-18 NOTE — H&P PST ADULT - NEGATIVE CARDIOVASCULAR SYMPTOMS
no chest pain/no palpitations/no orthopnea/no paroxysmal nocturnal dyspnea/no peripheral edema/no claudication no chest pain/no palpitations/no orthopnea/no paroxysmal nocturnal dyspnea/no claudication

## 2024-01-18 NOTE — H&P PST ADULT - OTHER CARE PROVIDERS
Dr. Tayo White (PCP) // Dr. Schwartz ([facility]), Dr. Wilkerson (Interventional Cardiologist), Dr. Zhang (CT Surgeon) Dr. Kacey El [phone] (PCP) // Dr. Schwartz ([facility]), Dr. Wilkerson (Interventional Cardiologist), Dr. Zhang (CT Surgeon)

## 2024-01-18 NOTE — H&P PST ADULT - NSICDXFAMILYHX_GEN_ALL_CORE_FT
FAMILY HISTORY:  Mother  Still living? Unknown  FH: breast cancer, Age at diagnosis: Age Unknown  FH: leukemia, Age at diagnosis: Age Unknown    Sibling  Still living? Unknown  FH: breast cancer, Age at diagnosis: Age Unknown    Aunt  Still living? Unknown  FH: breast cancer, Age at diagnosis: Age Unknown

## 2024-01-18 NOTE — H&P PST ADULT - NSICDXPASTSURGICALHX_GEN_ALL_CORE_FT
PAST SURGICAL HISTORY:  H/O prosthetic mitral valve bioprosthetic bovine 2013    History of cardiac cath     S/P breast lumpectomy left     PAST SURGICAL HISTORY:  H/O prosthetic mitral valve bioprosthetic bovine 2013    H/O right wrist surgery     History of cardiac cath     S/P breast lumpectomy left

## 2024-01-20 LAB
CULTURE RESULTS: SIGNIFICANT CHANGE UP
SPECIMEN SOURCE: SIGNIFICANT CHANGE UP

## 2024-01-29 RX ORDER — SODIUM CHLORIDE 9 MG/ML
3 INJECTION INTRAMUSCULAR; INTRAVENOUS; SUBCUTANEOUS EVERY 8 HOURS
Refills: 0 | Status: DISCONTINUED | OUTPATIENT
Start: 2024-01-30 | End: 2024-01-31

## 2024-01-30 ENCOUNTER — INPATIENT (INPATIENT)
Facility: HOSPITAL | Age: 69
LOS: 8 days | Discharge: HOME | DRG: 219 | End: 2024-02-08
Attending: THORACIC SURGERY (CARDIOTHORACIC VASCULAR SURGERY) | Admitting: THORACIC SURGERY (CARDIOTHORACIC VASCULAR SURGERY)
Payer: MEDICARE

## 2024-01-30 VITALS
HEART RATE: 61 BPM | HEIGHT: 68 IN | SYSTOLIC BLOOD PRESSURE: 115 MMHG | RESPIRATION RATE: 18 BRPM | WEIGHT: 159.39 LBS | OXYGEN SATURATION: 96 % | TEMPERATURE: 98 F | DIASTOLIC BLOOD PRESSURE: 70 MMHG

## 2024-01-30 DIAGNOSIS — Z98.890 OTHER SPECIFIED POSTPROCEDURAL STATES: Chronic | ICD-10-CM

## 2024-01-30 DIAGNOSIS — I34.0 NONRHEUMATIC MITRAL (VALVE) INSUFFICIENCY: ICD-10-CM

## 2024-01-30 DIAGNOSIS — Z95.2 PRESENCE OF PROSTHETIC HEART VALVE: Chronic | ICD-10-CM

## 2024-01-30 LAB
A1C WITH ESTIMATED AVERAGE GLUCOSE RESULT: 5.8 % — HIGH (ref 4–5.6)
ALBUMIN SERPL ELPH-MCNC: 4.4 G/DL — SIGNIFICANT CHANGE UP (ref 3.3–5.2)
ALP SERPL-CCNC: 84 U/L — SIGNIFICANT CHANGE UP (ref 40–120)
ALT FLD-CCNC: 23 U/L — SIGNIFICANT CHANGE UP
ANION GAP SERPL CALC-SCNC: 12 MMOL/L — SIGNIFICANT CHANGE UP (ref 5–17)
ANION GAP SERPL CALC-SCNC: 13 MMOL/L — SIGNIFICANT CHANGE UP (ref 5–17)
APPEARANCE UR: CLEAR — SIGNIFICANT CHANGE UP
APTT BLD: 197.7 SEC — CRITICAL HIGH (ref 24.5–35.6)
APTT BLD: 28 SEC — SIGNIFICANT CHANGE UP (ref 24.5–35.6)
AST SERPL-CCNC: 23 U/L — SIGNIFICANT CHANGE UP
BASOPHILS # BLD AUTO: 0.02 K/UL — SIGNIFICANT CHANGE UP (ref 0–0.2)
BASOPHILS NFR BLD AUTO: 0.3 % — SIGNIFICANT CHANGE UP (ref 0–2)
BILIRUB SERPL-MCNC: 0.4 MG/DL — SIGNIFICANT CHANGE UP (ref 0.4–2)
BILIRUB UR-MCNC: NEGATIVE — SIGNIFICANT CHANGE UP
BLD GP AB SCN SERPL QL: SIGNIFICANT CHANGE UP
BUN SERPL-MCNC: 21.8 MG/DL — HIGH (ref 8–20)
BUN SERPL-MCNC: 24.3 MG/DL — HIGH (ref 8–20)
CALCIUM SERPL-MCNC: 10.4 MG/DL — SIGNIFICANT CHANGE UP (ref 8.4–10.5)
CALCIUM SERPL-MCNC: 9.5 MG/DL — SIGNIFICANT CHANGE UP (ref 8.4–10.5)
CHLORIDE SERPL-SCNC: 96 MMOL/L — SIGNIFICANT CHANGE UP (ref 96–108)
CHLORIDE SERPL-SCNC: 98 MMOL/L — SIGNIFICANT CHANGE UP (ref 96–108)
CO2 SERPL-SCNC: 26 MMOL/L — SIGNIFICANT CHANGE UP (ref 22–29)
CO2 SERPL-SCNC: 27 MMOL/L — SIGNIFICANT CHANGE UP (ref 22–29)
COLOR SPEC: YELLOW — SIGNIFICANT CHANGE UP
CREAT SERPL-MCNC: 1.25 MG/DL — SIGNIFICANT CHANGE UP (ref 0.5–1.3)
CREAT SERPL-MCNC: 1.37 MG/DL — HIGH (ref 0.5–1.3)
DIFF PNL FLD: NEGATIVE — SIGNIFICANT CHANGE UP
EGFR: 43 ML/MIN/1.73M2 — LOW
EGFR: 48 ML/MIN/1.73M2 — LOW
EOSINOPHIL # BLD AUTO: 0.12 K/UL — SIGNIFICANT CHANGE UP (ref 0–0.5)
EOSINOPHIL NFR BLD AUTO: 1.6 % — SIGNIFICANT CHANGE UP (ref 0–6)
ESTIMATED AVERAGE GLUCOSE: 120 MG/DL — HIGH (ref 68–114)
GLUCOSE SERPL-MCNC: 104 MG/DL — HIGH (ref 70–99)
GLUCOSE SERPL-MCNC: 94 MG/DL — SIGNIFICANT CHANGE UP (ref 70–99)
GLUCOSE UR QL: NEGATIVE MG/DL — SIGNIFICANT CHANGE UP
HCT VFR BLD CALC: 36.9 % — SIGNIFICANT CHANGE UP (ref 34.5–45)
HCT VFR BLD CALC: 38.2 % — SIGNIFICANT CHANGE UP (ref 34.5–45)
HGB BLD-MCNC: 12.4 G/DL — SIGNIFICANT CHANGE UP (ref 11.5–15.5)
HGB BLD-MCNC: 12.4 G/DL — SIGNIFICANT CHANGE UP (ref 11.5–15.5)
IMM GRANULOCYTES NFR BLD AUTO: 0.4 % — SIGNIFICANT CHANGE UP (ref 0–0.9)
INR BLD: 1.04 RATIO — SIGNIFICANT CHANGE UP (ref 0.85–1.18)
KETONES UR-MCNC: NEGATIVE MG/DL — SIGNIFICANT CHANGE UP
LEUKOCYTE ESTERASE UR-ACNC: NEGATIVE — SIGNIFICANT CHANGE UP
LYMPHOCYTES # BLD AUTO: 0.94 K/UL — LOW (ref 1–3.3)
LYMPHOCYTES # BLD AUTO: 12.5 % — LOW (ref 13–44)
MCHC RBC-ENTMCNC: 30.2 PG — SIGNIFICANT CHANGE UP (ref 27–34)
MCHC RBC-ENTMCNC: 31.4 PG — SIGNIFICANT CHANGE UP (ref 27–34)
MCHC RBC-ENTMCNC: 32.5 GM/DL — SIGNIFICANT CHANGE UP (ref 32–36)
MCHC RBC-ENTMCNC: 33.6 GM/DL — SIGNIFICANT CHANGE UP (ref 32–36)
MCV RBC AUTO: 92.9 FL — SIGNIFICANT CHANGE UP (ref 80–100)
MCV RBC AUTO: 93.4 FL — SIGNIFICANT CHANGE UP (ref 80–100)
MONOCYTES # BLD AUTO: 0.51 K/UL — SIGNIFICANT CHANGE UP (ref 0–0.9)
MONOCYTES NFR BLD AUTO: 6.8 % — SIGNIFICANT CHANGE UP (ref 2–14)
NEUTROPHILS # BLD AUTO: 5.93 K/UL — SIGNIFICANT CHANGE UP (ref 1.8–7.4)
NEUTROPHILS NFR BLD AUTO: 78.4 % — HIGH (ref 43–77)
NITRITE UR-MCNC: NEGATIVE — SIGNIFICANT CHANGE UP
NT-PROBNP SERPL-SCNC: 1916 PG/ML — HIGH (ref 0–300)
PA ADP PRP-ACNC: 207 PRU — SIGNIFICANT CHANGE UP (ref 180–376)
PH UR: 5.5 — SIGNIFICANT CHANGE UP (ref 5–8)
PLATELET # BLD AUTO: 189 K/UL — SIGNIFICANT CHANGE UP (ref 150–400)
PLATELET # BLD AUTO: 202 K/UL — SIGNIFICANT CHANGE UP (ref 150–400)
POTASSIUM SERPL-MCNC: 3.9 MMOL/L — SIGNIFICANT CHANGE UP (ref 3.5–5.3)
POTASSIUM SERPL-MCNC: 4.2 MMOL/L — SIGNIFICANT CHANGE UP (ref 3.5–5.3)
POTASSIUM SERPL-SCNC: 3.9 MMOL/L — SIGNIFICANT CHANGE UP (ref 3.5–5.3)
POTASSIUM SERPL-SCNC: 4.2 MMOL/L — SIGNIFICANT CHANGE UP (ref 3.5–5.3)
PROT SERPL-MCNC: 7.2 G/DL — SIGNIFICANT CHANGE UP (ref 6.6–8.7)
PROT UR-MCNC: NEGATIVE MG/DL — SIGNIFICANT CHANGE UP
PROTHROM AB SERPL-ACNC: 11.5 SEC — SIGNIFICANT CHANGE UP (ref 9.5–13)
RBC # BLD: 3.95 M/UL — SIGNIFICANT CHANGE UP (ref 3.8–5.2)
RBC # BLD: 4.11 M/UL — SIGNIFICANT CHANGE UP (ref 3.8–5.2)
RBC # FLD: 12.4 % — SIGNIFICANT CHANGE UP (ref 10.3–14.5)
RBC # FLD: 12.5 % — SIGNIFICANT CHANGE UP (ref 10.3–14.5)
SODIUM SERPL-SCNC: 135 MMOL/L — SIGNIFICANT CHANGE UP (ref 135–145)
SODIUM SERPL-SCNC: 137 MMOL/L — SIGNIFICANT CHANGE UP (ref 135–145)
SP GR SPEC: 1.01 — SIGNIFICANT CHANGE UP (ref 1–1.03)
T4 FREE SERPL-MCNC: 1.3 NG/DL — SIGNIFICANT CHANGE UP (ref 0.9–1.8)
TSH SERPL-MCNC: 4.64 UIU/ML — HIGH (ref 0.27–4.2)
UROBILINOGEN FLD QL: 0.2 MG/DL — SIGNIFICANT CHANGE UP (ref 0.2–1)
WBC # BLD: 7.5 K/UL — SIGNIFICANT CHANGE UP (ref 3.8–10.5)
WBC # BLD: 7.55 K/UL — SIGNIFICANT CHANGE UP (ref 3.8–10.5)
WBC # FLD AUTO: 7.5 K/UL — SIGNIFICANT CHANGE UP (ref 3.8–10.5)
WBC # FLD AUTO: 7.55 K/UL — SIGNIFICANT CHANGE UP (ref 3.8–10.5)

## 2024-01-30 PROCEDURE — 71045 X-RAY EXAM CHEST 1 VIEW: CPT | Mod: 26

## 2024-01-30 PROCEDURE — 93010 ELECTROCARDIOGRAM REPORT: CPT

## 2024-01-30 PROCEDURE — 99223 1ST HOSP IP/OBS HIGH 75: CPT | Mod: FS

## 2024-01-30 PROCEDURE — 99024 POSTOP FOLLOW-UP VISIT: CPT

## 2024-01-30 RX ORDER — VANCOMYCIN HCL 1 G
1000 VIAL (EA) INTRAVENOUS ONCE
Refills: 0 | Status: COMPLETED | OUTPATIENT
Start: 2024-01-31 | End: 2024-01-31

## 2024-01-30 RX ORDER — METOPROLOL TARTRATE 50 MG
50 TABLET ORAL
Refills: 0 | Status: DISCONTINUED | OUTPATIENT
Start: 2024-01-30 | End: 2024-01-31

## 2024-01-30 RX ORDER — HEPARIN SODIUM 5000 [USP'U]/ML
6000 INJECTION INTRAVENOUS; SUBCUTANEOUS ONCE
Refills: 0 | Status: COMPLETED | OUTPATIENT
Start: 2024-01-30 | End: 2024-01-30

## 2024-01-30 RX ORDER — HEPARIN SODIUM 5000 [USP'U]/ML
6000 INJECTION INTRAVENOUS; SUBCUTANEOUS EVERY 6 HOURS
Refills: 0 | Status: DISCONTINUED | OUTPATIENT
Start: 2024-01-30 | End: 2024-01-31

## 2024-01-30 RX ORDER — ATORVASTATIN CALCIUM 80 MG/1
80 TABLET, FILM COATED ORAL AT BEDTIME
Refills: 0 | Status: DISCONTINUED | OUTPATIENT
Start: 2024-01-30 | End: 2024-01-31

## 2024-01-30 RX ORDER — CHLORHEXIDINE GLUCONATE 213 G/1000ML
1 SOLUTION TOPICAL
Refills: 0 | Status: DISCONTINUED | OUTPATIENT
Start: 2024-01-30 | End: 2024-01-31

## 2024-01-30 RX ORDER — SPIRONOLACTONE 25 MG/1
25 TABLET, FILM COATED ORAL DAILY
Refills: 0 | Status: DISCONTINUED | OUTPATIENT
Start: 2024-01-30 | End: 2024-01-30

## 2024-01-30 RX ORDER — HEPARIN SODIUM 5000 [USP'U]/ML
INJECTION INTRAVENOUS; SUBCUTANEOUS
Qty: 25000 | Refills: 0 | Status: DISCONTINUED | OUTPATIENT
Start: 2024-01-30 | End: 2024-01-31

## 2024-01-30 RX ORDER — ACETAMINOPHEN 500 MG
650 TABLET ORAL EVERY 6 HOURS
Refills: 0 | Status: DISCONTINUED | OUTPATIENT
Start: 2024-01-30 | End: 2024-01-31

## 2024-01-30 RX ORDER — FUROSEMIDE 40 MG
20 TABLET ORAL DAILY
Refills: 0 | Status: DISCONTINUED | OUTPATIENT
Start: 2024-01-30 | End: 2024-01-30

## 2024-01-30 RX ORDER — CEFUROXIME AXETIL 250 MG
1500 TABLET ORAL ONCE
Refills: 0 | Status: COMPLETED | OUTPATIENT
Start: 2024-01-30 | End: 2024-01-31

## 2024-01-30 RX ORDER — CHLORHEXIDINE GLUCONATE 213 G/1000ML
15 SOLUTION TOPICAL
Refills: 0 | Status: DISCONTINUED | OUTPATIENT
Start: 2024-01-30 | End: 2024-01-31

## 2024-01-30 RX ORDER — SODIUM CHLORIDE 9 MG/ML
1000 INJECTION INTRAMUSCULAR; INTRAVENOUS; SUBCUTANEOUS
Refills: 0 | Status: DISCONTINUED | OUTPATIENT
Start: 2024-01-30 | End: 2024-01-31

## 2024-01-30 RX ORDER — HEPARIN SODIUM 5000 [USP'U]/ML
3000 INJECTION INTRAVENOUS; SUBCUTANEOUS EVERY 6 HOURS
Refills: 0 | Status: DISCONTINUED | OUTPATIENT
Start: 2024-01-30 | End: 2024-01-31

## 2024-01-30 RX ADMIN — CHLORHEXIDINE GLUCONATE 15 MILLILITER(S): 213 SOLUTION TOPICAL at 17:09

## 2024-01-30 RX ADMIN — CHLORHEXIDINE GLUCONATE 1 APPLICATION(S): 213 SOLUTION TOPICAL at 17:09

## 2024-01-30 RX ADMIN — SODIUM CHLORIDE 3 MILLILITER(S): 9 INJECTION INTRAMUSCULAR; INTRAVENOUS; SUBCUTANEOUS at 21:52

## 2024-01-30 RX ADMIN — SODIUM CHLORIDE 50 MILLILITER(S): 9 INJECTION INTRAMUSCULAR; INTRAVENOUS; SUBCUTANEOUS at 17:11

## 2024-01-30 RX ADMIN — SODIUM CHLORIDE 3 MILLILITER(S): 9 INJECTION INTRAMUSCULAR; INTRAVENOUS; SUBCUTANEOUS at 14:29

## 2024-01-30 RX ADMIN — HEPARIN SODIUM 1300 UNIT(S)/HR: 5000 INJECTION INTRAVENOUS; SUBCUTANEOUS at 14:22

## 2024-01-30 RX ADMIN — Medication 50 MILLIGRAM(S): at 17:08

## 2024-01-30 RX ADMIN — HEPARIN SODIUM 1300 UNIT(S)/HR: 5000 INJECTION INTRAVENOUS; SUBCUTANEOUS at 19:37

## 2024-01-30 RX ADMIN — HEPARIN SODIUM 6000 UNIT(S): 5000 INJECTION INTRAVENOUS; SUBCUTANEOUS at 14:20

## 2024-01-30 RX ADMIN — ATORVASTATIN CALCIUM 80 MILLIGRAM(S): 80 TABLET, FILM COATED ORAL at 22:39

## 2024-01-30 RX ADMIN — HEPARIN SODIUM 1100 UNIT(S)/HR: 5000 INJECTION INTRAVENOUS; SUBCUTANEOUS at 22:35

## 2024-01-30 RX ADMIN — HEPARIN SODIUM 0 UNIT(S)/HR: 5000 INJECTION INTRAVENOUS; SUBCUTANEOUS at 20:54

## 2024-01-30 NOTE — CONSULT NOTE ADULT - ASSESSMENT
Assessment & Plan=    69 y/o female with PMH of HTN, HLD, left breast cancer (s/p XRT and lumpectomy; 2009), pAF (on Eliquis) and Mitral valve stenosis s/p Mitral valve replacement #29 Perimount Giovanna Jackson with MAZE ablation (April 2013 Dr Zhang [facility]) presents to PST with complaints of worsening shortness of breath. Nephrology consult called in for worsening of S. Creatinine before repair of cardiac valves.    CKD=    - Worsening of serum creatinine, may be in setting of diuretics ( Furosemide 20 mg x OD & Spironolactone 25 mg x OD ).   Will hold diuretics.   Will start gental hydration @ 75 ml/hours for 6 hours.    Will repeat CMP tomorrow morning. Will review labs and reassess patient.    If no improvement in creatinine levels, may need renal ultrasounds.     HTN=      BP is controlled with current regimen which is continued.     Electrolytes=    In ranges.     Anemia of CKD=    Hgb is good range.  Assessment & Plan=    67 y/o female with PMH of HTN, HLD, left breast cancer (s/p XRT and lumpectomy; 2009), pAF (on Eliquis) and Mitral valve stenosis s/p Mitral valve replacement #29 Perimount Giovanna Jackson with MAZE ablation (April 2013 Dr Zhang [facility]) presents to PST with complaints of worsening shortness of breath. Nephrology consult called in for worsening of S. Creatinine before repair of cardiac valves.    CKD=    - Worsening of serum creatinine, may be in setting of diuretics ( Furosemide 20 mg x OD & Spironolactone 25 mg x OD ).   Will hold diuretics.   Will start gental hydration @ 75 ml/hours for 6 hours.    Will repeat CMP tomorrow morning. Will review labs and reassess patient.    If no improvement in creatinine levels, may need renal ultrasounds    HTN=      BP is controlled with current regimen which is continued.     Electrolytes=    In ranges.     Anemia of CKD=    Hgb is good range.

## 2024-01-30 NOTE — CONSULT NOTE ADULT - NS ATTEND AMEND GEN_ALL_CORE FT
Ismael on CKD-  likely in setting of diuretics use.  Scr improved with gentle hydration.  Nephrology follow up prn

## 2024-01-30 NOTE — PATIENT PROFILE ADULT - ARE SIGNIFICANT INDICATORS COMPLETE.
Lab work was to look for inflammatory process.  Discussion of oral steroid post lab results.  No discussion of wrist injection.  Per chart has previously had a CT injection.     LVM for patient to call back and discuss  Regine Raza MS ATC     Yes

## 2024-01-30 NOTE — CONSULT NOTE ADULT - SUBJECTIVE AND OBJECTIVE BOX
[facility] DIVISION OF KIDNEY DISEASES AND HYPERTENSION -- INITIAL CONSULT NOTE  --------------------------------------------------------------------------------  HPI:        PAST HISTORY  --------------------------------------------------------------------------------  PAST MEDICAL & SURGICAL HISTORY:  Afib      Breast cancer  left; s/p lumpectomy and XRT      Mitral valve insufficiency      Tricuspid valve insufficiency      HTN (hypertension)      HLD (hyperlipidemia)      S/P breast lumpectomy  left      H/O prosthetic mitral valve  bioprosthetic bovine 2013      History of cardiac cath      H/O right wrist surgery        FAMILY HISTORY:  FH: breast cancer (Mother, Sibling, Aunt)    FH: leukemia (Mother)      PAST SOCIAL HISTORY:    ALLERGIES & MEDICATIONS  --------------------------------------------------------------------------------  Allergies    No Known Allergies    Intolerances      Standing Inpatient Medications  atorvastatin 80 milliGRAM(s) Oral at bedtime  cefuroxime  IVPB 1500 milliGRAM(s) IV Intermittent once  chlorhexidine 0.12% Liquid 15 milliLiter(s) Swish and Spit two times a day  chlorhexidine 4% Liquid 1 Application(s) Topical two times a day  heparin  Infusion.  Unit(s)/Hr IV Continuous <Continuous>  metoprolol tartrate 50 milliGRAM(s) Oral two times a day  sodium chloride 0.9% lock flush 3 milliLiter(s) IV Push every 8 hours  sodium chloride 0.9%. 1000 milliLiter(s) IV Continuous <Continuous>    PRN Inpatient Medications  acetaminophen     Tablet .. 650 milliGRAM(s) Oral every 6 hours PRN  heparin   Injectable 6000 Unit(s) IV Push every 6 hours PRN  heparin   Injectable 3000 Unit(s) IV Push every 6 hours PRN      REVIEW OF SYSTEMS  --------------------------------------------------------------------------------  Gen: No weight changes, fatigue, fevers/chills, weakness  Skin: No rashes  Head/Eyes/Ears/Mouth: No headache; Normal hearing; Normal vision w/o blurriness; No sinus pain/discomfort, sore throat  Respiratory: No dyspnea, cough, wheezing, hemoptysis  CV: No chest pain, PND, orthopnea  GI: No abdominal pain, diarrhea, constipation, nausea, vomiting, melena, hematochezia  : No increased frequency, dysuria, hematuria, nocturia  MSK: No joint pain/swelling; no back pain; no edema  Neuro: No dizziness/lightheadedness, weakness, seizures, numbness, tingling  Heme: No easy bruising or bleeding  Endo: No heat/cold intolerance  Psych: No significant nervousness, anxiety, stress, depression    All other systems were reviewed and are negative, except as noted.    VITALS/PHYSICAL EXAM  --------------------------------------------------------------------------------  T(C): 36.6 (01-30-24 @ 11:00), Max: 36.6 (01-30-24 @ 11:00)  HR: 61 (01-30-24 @ 11:00) (61 - 61)  BP: 115/70 (01-30-24 @ 11:00) (115/70 - 115/70)  RR: 18 (01-30-24 @ 11:00) (18 - 18)  SpO2: 96% (01-30-24 @ 11:00) (96% - 96%)  Wt(kg): --  Height (cm): 172.7 (01-30-24 @ 11:00)  Weight (kg): 72.3 (01-30-24 @ 11:00)  BMI (kg/m2): 24.2 (01-30-24 @ 11:00)  BSA (m2): 1.86 (01-30-24 @ 11:00)      Physical Exam:  	Gen: NAD, well-appearing  	HEENT: PERRL, supple neck, clear oropharynx  	Pulm: CTA B/L  	CV: RRR, S1S2; no rub  	Back: No spinal or CVA tenderness; no sacral edema  	Abd: +BS, soft, nontender/nondistended  	: No suprapubic tenderness  	UE: Warm, FROM, no clubbing, intact strength; no edema; no asterixis  	LE: Warm, FROM, no clubbing, intact strength; no edema  	Neuro: No focal deficits, intact gait  	Psych: Normal affect and mood  	Skin: Warm, without rashes  	Vascular access:    LABS/STUDIES  --------------------------------------------------------------------------------              12.4   7.55  >-----------<  202      [01-30-24 @ 11:36]              38.2     135  |  96  |  21.8  ----------------------------<  94      [01-30-24 @ 11:36]  4.2   |  27.0  |  1.37        Ca     10.4     [01-30-24 @ 11:36]    TPro  7.2  /  Alb  4.4  /  TBili  0.4  /  DBili  x   /  AST  23  /  ALT  23  /  AlkPhos  84  [01-30-24 @ 11:36]    PT/INR: PT 11.5 , INR 1.04       [01-30-24 @ 11:36]  PTT: 28.0       [01-30-24 @ 11:36]      Creatinine Trend:  SCr 1.37 [01-30 @ 11:36]  SCr 1.02 [01-18 @ 09:50]  SCr 0.95 [01-09 @ 04:40]  SCr 0.90 [01-08 @ 05:03]  SCr 0.87 [01-07 @ 04:47]    Urinalysis - [01-30-24 @ 14:00]      Color Yellow / Appearance Clear / SG 1.013 / pH 5.5      Gluc Negative / Ketone Negative  / Bili Negative / Urobili 0.2       Blood Negative / Protein Negative / Leuk Est Negative / Nitrite Negative      RBC  / WBC  / Hyaline  / Gran  / Sq Epi  / Non Sq Epi  / Bacteria       TSH 4.64      [01-30-24 @ 11:36]       [facility] DIVISION OF KIDNEY DISEASES AND HYPERTENSION -- INITIAL CONSULT NOTE  --------------------------------------------------------------------------------  HPI:	  67 y/o female with PMH of HTN, HLD, left breast cancer (s/p XRT and lumpectomy; 2009), pAF (on Eliquis) and Mitral valve stenosis s/p Mitral valve replacement #29 Perimount Giovanna Jackson with MAZE ablation (April 2013 Dr Zhang [facility]) presents to PST with complaints of worsening shortness of breath. States in 06/2023 she started to notice worsening shortness of breath and STEWART which has gotten progressively worse since. She has also had 2 episodes of atrial fibrillation with cardioversions since 06/2023 with most recent cardioversion 01/2024. She was recently admitted to [facility] with complaints of 3 weeks of worsening SOB and STEWART, acute on chronic diastolic CHF exacerbation, with SARINA showing severe prosthetic mitral stenosis and severe TR, s/p LHC with no obstructive CAD noted. Denies chest pain, palpitations, dizziness, lightheadedness or near syncope. Patient is scheduled for mitral valve repair, possible replace, tricuspid valve replacement with Dr. Jimenez on 1/31/23, to be pre- admitted on 1/30/24. Nephrology consult is called in for worsening of creatinine since last labs, 1.37 from 1.02 mg/dL.        PAST HISTORY  --------------------------------------------------------------------------------  PAST MEDICAL & SURGICAL HISTORY:  Afib      Breast cancer  left; s/p lumpectomy and XRT      Mitral valve insufficiency      Tricuspid valve insufficiency      HTN (hypertension)      HLD (hyperlipidemia)      S/P breast lumpectomy  left      H/O prosthetic mitral valve  bioprosthetic bovine 2013      History of cardiac cath      H/O right wrist surgery        FAMILY HISTORY:  FH: breast cancer (Mother, Sibling, Aunt)    FH: leukemia (Mother)      PAST SOCIAL HISTORY:    ALLERGIES & MEDICATIONS  --------------------------------------------------------------------------------  Allergies    No Known Allergies    Intolerances      Standing Inpatient Medications  atorvastatin 80 milliGRAM(s) Oral at bedtime  cefuroxime  IVPB 1500 milliGRAM(s) IV Intermittent once  chlorhexidine 0.12% Liquid 15 milliLiter(s) Swish and Spit two times a day  chlorhexidine 4% Liquid 1 Application(s) Topical two times a day  heparin  Infusion.  Unit(s)/Hr IV Continuous <Continuous>  metoprolol tartrate 50 milliGRAM(s) Oral two times a day  sodium chloride 0.9% lock flush 3 milliLiter(s) IV Push every 8 hours  sodium chloride 0.9%. 1000 milliLiter(s) IV Continuous <Continuous>    PRN Inpatient Medications  acetaminophen     Tablet .. 650 milliGRAM(s) Oral every 6 hours PRN  heparin   Injectable 6000 Unit(s) IV Push every 6 hours PRN  heparin   Injectable 3000 Unit(s) IV Push every 6 hours PRN      REVIEW OF SYSTEMS  --------------------------------------------------------------------------------  Gen: No weight changes, fatigue, fevers/chills, weakness  Skin: No rashes  Head/Eyes/Ears/Mouth: No headache; Normal hearing; Normal vision w/o blurriness; No sinus pain/discomfort, sore throat  Respiratory: No dyspnea, cough, wheezing, hemoptysis  CV: No chest pain, PND, orthopnea  GI: No abdominal pain, diarrhea, constipation, nausea, vomiting, melena, hematochezia  : No increased frequency, dysuria, hematuria, nocturia  MSK: No joint pain/swelling; no back pain; no edema  Neuro: No dizziness/lightheadedness, weakness, seizures, numbness, tingling  Heme: h/o easy bruising.   Endo: No heat/cold intolerance  Psych: No significant nervousness, anxiety, stress, depression    All other systems were reviewed and are negative, except as noted.    VITALS/PHYSICAL EXAM  --------------------------------------------------------------------------------  T(C): 36.6 (01-30-24 @ 11:00), Max: 36.6 (01-30-24 @ 11:00)  HR: 61 (01-30-24 @ 11:00) (61 - 61)  BP: 115/70 (01-30-24 @ 11:00) (115/70 - 115/70)  RR: 18 (01-30-24 @ 11:00) (18 - 18)  SpO2: 96% (01-30-24 @ 11:00) (96% - 96%)  Wt(kg): --  Height (cm): 172.7 (01-30-24 @ 11:00)  Weight (kg): 72.3 (01-30-24 @ 11:00)  BMI (kg/m2): 24.2 (01-30-24 @ 11:00)  BSA (m2): 1.86 (01-30-24 @ 11:00)      Physical Exam:  	Gen: NAD, well-appearing, well groomed.   	HEENT:  supple neck, clear oropharynx. Non icteric sclera.   	Pulm: CTA B/L. NO added sounds.   	CV: RRR, S1S2; no rub  	Back: No spinal or CVA tenderness; no sacral edema  	Abd: +BS, soft, nontender/nondistended  	: No suprapubic tenderness  	UE: Warm, FROM, no clubbing, intact strength; no edema; no asterixis  	LE: Warm, FROM, no clubbing, intact strength; no edema  	Neuro: No focal deficits, intact gait  	Psych: Normal affect and mood  	Skin: Warm, without rashes      LABS/STUDIES  --------------------------------------------------------------------------------              12.4   7.55  >-----------<  202      [01-30-24 @ 11:36]              38.2     135  |  96  |  21.8  ----------------------------<  94      [01-30-24 @ 11:36]  4.2   |  27.0  |  1.37        Ca     10.4     [01-30-24 @ 11:36]    TPro  7.2  /  Alb  4.4  /  TBili  0.4  /  DBili  x   /  AST  23  /  ALT  23  /  AlkPhos  84  [01-30-24 @ 11:36]    PT/INR: PT 11.5 , INR 1.04       [01-30-24 @ 11:36]  PTT: 28.0       [01-30-24 @ 11:36]      Creatinine Trend:  SCr 1.37 [01-30 @ 11:36]  SCr 1.02 [01-18 @ 09:50]  SCr 0.95 [01-09 @ 04:40]  SCr 0.90 [01-08 @ 05:03]  SCr 0.87 [01-07 @ 04:47]    Urinalysis - [01-30-24 @ 14:00]      Color Yellow / Appearance Clear / SG 1.013 / pH 5.5      Gluc Negative / Ketone Negative  / Bili Negative / Urobili 0.2       Blood Negative / Protein Negative / Leuk Est Negative / Nitrite Negative      RBC  / WBC  / Hyaline  / Gran  / Sq Epi  / Non Sq Epi  / Bacteria       TSH 4.64      [01-30-24 @ 11:36]

## 2024-01-30 NOTE — PROVIDER CONTACT NOTE (CRITICAL VALUE NOTIFICATION) - ACTION/TREATMENT ORDERED:
aPTT results 197.7, CTS CADY Chen notified, per PA hold Heparin gtt for 2 hours instead of 1 hour per nomogram, then decrease rate to 1100 units/hr.

## 2024-01-30 NOTE — PATIENT PROFILE ADULT - FUNCTIONAL ASSESSMENT - DAILY ACTIVITY ASSESSMENT TYPE
Jessenia tested positive for MRSA from 7/26 clinic pre op visit. Per recommendations from Dr. Woodard, I e-prescribed topical Bactroban to be applied to inside of both nares 3x daily starting today until OR date on 8/21/19.  left a voicemail for mother to discuss diagnosis,  Prescription, and indications for use. I will attempt to call mother/father back today.   Admission

## 2024-01-30 NOTE — PATIENT PROFILE ADULT - FALL HARM RISK - HARM RISK INTERVENTIONS

## 2024-01-30 NOTE — PROGRESS NOTE ADULT - SUBJECTIVE AND OBJECTIVE BOX
Patient seen and examined.  Denies CP, SOB, N/V. Admitted for heparin drip prior to surgery.     T(C): 36.6 (01-30-24 @ 11:00)  T(F): 97.9 (01-30-24 @ 11:00)  HR: 61 (01-30-24 @ 11:00)  BP: 115/70 (01-30-24 @ 11:00)  BP(mean): 85 (01-30-24 @ 11:00)    RR: 18 (01-30-24 @ 11:00)  SpO2: 96% (01-30-24 @ 11:00)      Physical Exam:  Gen: A&Ox3  Pulm:  CTA b/l, no r/r/w  CV:  S1S2, RRR,  Abd: +BS, soft, NT, ND  Ext:  +DP b/l, no c/c/e      I&O's Detail            CAPILLARY BLOOD GLUCOSE            Medications:  acetaminophen     Tablet .. 650 milliGRAM(s) Oral every 6 hours PRN  atorvastatin 80 milliGRAM(s) Oral at bedtime  cefuroxime  IVPB 1500 milliGRAM(s) IV Intermittent once  chlorhexidine 0.12% Liquid 15 milliLiter(s) Swish and Spit two times a day  chlorhexidine 4% Liquid 1 Application(s) Topical two times a day  furosemide    Tablet 20 milliGRAM(s) Oral daily  heparin   Injectable 6000 Unit(s) IV Push every 6 hours PRN  heparin   Injectable 3000 Unit(s) IV Push every 6 hours PRN  heparin   Injectable 6000 Unit(s) IV Push once  heparin  Infusion.  Unit(s)/Hr IV Continuous <Continuous>  metoprolol tartrate 50 milliGRAM(s) Oral two times a day  sodium chloride 0.9% lock flush 3 milliLiter(s) IV Push every 8 hours  spironolactone 25 milliGRAM(s) Oral daily

## 2024-01-31 ENCOUNTER — TRANSCRIPTION ENCOUNTER (OUTPATIENT)
Age: 69
End: 2024-01-31

## 2024-01-31 ENCOUNTER — APPOINTMENT (OUTPATIENT)
Dept: CARDIOTHORACIC SURGERY | Facility: HOSPITAL | Age: 69
End: 2024-01-31

## 2024-01-31 LAB
ALBUMIN SERPL ELPH-MCNC: 3.2 G/DL — LOW (ref 3.3–5.2)
ALP SERPL-CCNC: 52 U/L — SIGNIFICANT CHANGE UP (ref 40–120)
ALT FLD-CCNC: 12 U/L — SIGNIFICANT CHANGE UP
ANION GAP SERPL CALC-SCNC: 13 MMOL/L — SIGNIFICANT CHANGE UP (ref 5–17)
ANION GAP SERPL CALC-SCNC: 14 MMOL/L — SIGNIFICANT CHANGE UP (ref 5–17)
APTT BLD: 28.2 SEC — SIGNIFICANT CHANGE UP (ref 24.5–35.6)
APTT BLD: 90.7 SEC — HIGH (ref 24.5–35.6)
AST SERPL-CCNC: 27 U/L — SIGNIFICANT CHANGE UP
BASE EXCESS BLDA CALC-SCNC: -1.1 MMOL/L — SIGNIFICANT CHANGE UP (ref -2–3)
BASE EXCESS BLDA CALC-SCNC: -2.6 MMOL/L — LOW (ref -2–3)
BASE EXCESS BLDA CALC-SCNC: -3.7 MMOL/L — LOW (ref -2–3)
BASE EXCESS BLDA CALC-SCNC: 0.1 MMOL/L — SIGNIFICANT CHANGE UP (ref -2–3)
BASE EXCESS BLDA CALC-SCNC: 0.1 MMOL/L — SIGNIFICANT CHANGE UP (ref -2–3)
BASE EXCESS BLDA CALC-SCNC: 0.9 MMOL/L — SIGNIFICANT CHANGE UP (ref -2–3)
BASE EXCESS BLDA CALC-SCNC: 1 MMOL/L — SIGNIFICANT CHANGE UP (ref -2–3)
BASE EXCESS BLDA CALC-SCNC: 2.4 MMOL/L — SIGNIFICANT CHANGE UP (ref -2–3)
BASE EXCESS BLDV CALC-SCNC: 1.4 MMOL/L — SIGNIFICANT CHANGE UP (ref -2–3)
BASE EXCESS BLDV CALC-SCNC: 3.1 MMOL/L — HIGH (ref -2–3)
BASE EXCESS BLDV CALC-SCNC: 5.9 MMOL/L — HIGH (ref -2–3)
BASOPHILS # BLD AUTO: 0.01 K/UL — SIGNIFICANT CHANGE UP (ref 0–0.2)
BASOPHILS NFR BLD AUTO: 0.1 % — SIGNIFICANT CHANGE UP (ref 0–2)
BILIRUB SERPL-MCNC: 0.6 MG/DL — SIGNIFICANT CHANGE UP (ref 0.4–2)
BUN SERPL-MCNC: 16.9 MG/DL — SIGNIFICANT CHANGE UP (ref 8–20)
BUN SERPL-MCNC: 27.3 MG/DL — HIGH (ref 8–20)
CA-I BLDA-SCNC: 0.92 MMOL/L — LOW (ref 1.15–1.33)
CA-I BLDA-SCNC: 0.95 MMOL/L — LOW (ref 1.15–1.33)
CA-I BLDA-SCNC: 0.99 MMOL/L — LOW (ref 1.15–1.33)
CA-I BLDA-SCNC: 1.01 MMOL/L — LOW (ref 1.15–1.33)
CA-I BLDA-SCNC: 1.15 MMOL/L — SIGNIFICANT CHANGE UP (ref 1.15–1.33)
CA-I BLDA-SCNC: 1.16 MMOL/L — SIGNIFICANT CHANGE UP (ref 1.15–1.33)
CA-I BLDA-SCNC: 1.19 MMOL/L — SIGNIFICANT CHANGE UP (ref 1.15–1.33)
CA-I BLDA-SCNC: 1.2 MMOL/L — SIGNIFICANT CHANGE UP (ref 1.15–1.33)
CA-I SERPL-SCNC: 0.9 MMOL/L — LOW (ref 1.15–1.33)
CA-I SERPL-SCNC: 0.97 MMOL/L — LOW (ref 1.15–1.33)
CA-I SERPL-SCNC: 1.01 MMOL/L — LOW (ref 1.15–1.33)
CALCIUM SERPL-MCNC: 8.3 MG/DL — LOW (ref 8.4–10.5)
CALCIUM SERPL-MCNC: 9.3 MG/DL — SIGNIFICANT CHANGE UP (ref 8.4–10.5)
CHLORIDE BLDA-SCNC: 102 MMOL/L — SIGNIFICANT CHANGE UP (ref 96–108)
CHLORIDE BLDA-SCNC: 103 MMOL/L — SIGNIFICANT CHANGE UP (ref 96–108)
CHLORIDE BLDA-SCNC: 103 MMOL/L — SIGNIFICANT CHANGE UP (ref 96–108)
CHLORIDE BLDA-SCNC: 104 MMOL/L — SIGNIFICANT CHANGE UP (ref 96–108)
CHLORIDE BLDA-SCNC: 105 MMOL/L — SIGNIFICANT CHANGE UP (ref 96–108)
CHLORIDE BLDV-SCNC: 100 MMOL/L — SIGNIFICANT CHANGE UP (ref 96–108)
CHLORIDE BLDV-SCNC: 102 MMOL/L — SIGNIFICANT CHANGE UP (ref 96–108)
CHLORIDE BLDV-SCNC: 103 MMOL/L — SIGNIFICANT CHANGE UP (ref 96–108)
CHLORIDE SERPL-SCNC: 102 MMOL/L — SIGNIFICANT CHANGE UP (ref 96–108)
CHLORIDE SERPL-SCNC: 105 MMOL/L — SIGNIFICANT CHANGE UP (ref 96–108)
CK MB CFR SERPL CALC: 18.4 NG/ML — HIGH (ref 0–6.7)
CK SERPL-CCNC: 156 U/L — SIGNIFICANT CHANGE UP (ref 25–170)
CO2 SERPL-SCNC: 21 MMOL/L — LOW (ref 22–29)
CO2 SERPL-SCNC: 25 MMOL/L — SIGNIFICANT CHANGE UP (ref 22–29)
COHGB MFR BLDA: 0.4 % — SIGNIFICANT CHANGE UP
COHGB MFR BLDA: 0.9 % — SIGNIFICANT CHANGE UP
COHGB MFR BLDA: 1.1 % — SIGNIFICANT CHANGE UP
COHGB MFR BLDA: 1.3 % — SIGNIFICANT CHANGE UP
COHGB MFR BLDV: 0.5 % — SIGNIFICANT CHANGE UP
COHGB MFR BLDV: 1.4 % — SIGNIFICANT CHANGE UP
COHGB MFR BLDV: 1.5 % — SIGNIFICANT CHANGE UP
CREAT SERPL-MCNC: 0.87 MG/DL — SIGNIFICANT CHANGE UP (ref 0.5–1.3)
CREAT SERPL-MCNC: 1.12 MG/DL — SIGNIFICANT CHANGE UP (ref 0.5–1.3)
EGFR: 54 ML/MIN/1.73M2 — LOW
EGFR: 73 ML/MIN/1.73M2 — SIGNIFICANT CHANGE UP
EOSINOPHIL # BLD AUTO: 0.03 K/UL — SIGNIFICANT CHANGE UP (ref 0–0.5)
EOSINOPHIL NFR BLD AUTO: 0.4 % — SIGNIFICANT CHANGE UP (ref 0–6)
GAS PNL BLDA: SIGNIFICANT CHANGE UP
GAS PNL BLDA: SIGNIFICANT CHANGE UP
GAS PNL BLDV: 134 MMOL/L — LOW (ref 136–145)
GAS PNL BLDV: 135 MMOL/L — LOW (ref 136–145)
GAS PNL BLDV: 136 MMOL/L — SIGNIFICANT CHANGE UP (ref 136–145)
GLUCOSE BLDA-MCNC: 145 MG/DL — HIGH (ref 70–99)
GLUCOSE BLDA-MCNC: 149 MG/DL — HIGH (ref 70–99)
GLUCOSE BLDA-MCNC: 151 MG/DL — HIGH (ref 70–99)
GLUCOSE BLDA-MCNC: 152 MG/DL — HIGH (ref 70–99)
GLUCOSE BLDA-MCNC: 153 MG/DL — HIGH (ref 70–99)
GLUCOSE BLDA-MCNC: 155 MG/DL — HIGH (ref 70–99)
GLUCOSE BLDA-MCNC: 157 MG/DL — HIGH (ref 70–99)
GLUCOSE BLDA-MCNC: 99 MG/DL — SIGNIFICANT CHANGE UP (ref 70–99)
GLUCOSE BLDC GLUCOMTR-MCNC: 128 MG/DL — HIGH (ref 70–99)
GLUCOSE BLDC GLUCOMTR-MCNC: 132 MG/DL — HIGH (ref 70–99)
GLUCOSE BLDC GLUCOMTR-MCNC: 136 MG/DL — HIGH (ref 70–99)
GLUCOSE BLDC GLUCOMTR-MCNC: 136 MG/DL — HIGH (ref 70–99)
GLUCOSE BLDC GLUCOMTR-MCNC: 137 MG/DL — HIGH (ref 70–99)
GLUCOSE BLDC GLUCOMTR-MCNC: 138 MG/DL — HIGH (ref 70–99)
GLUCOSE BLDC GLUCOMTR-MCNC: 152 MG/DL — HIGH (ref 70–99)
GLUCOSE BLDV-MCNC: 141 MG/DL — HIGH (ref 70–99)
GLUCOSE BLDV-MCNC: 147 MG/DL — HIGH (ref 70–99)
GLUCOSE BLDV-MCNC: 155 MG/DL — HIGH (ref 70–99)
GLUCOSE SERPL-MCNC: 103 MG/DL — HIGH (ref 70–99)
GLUCOSE SERPL-MCNC: 130 MG/DL — HIGH (ref 70–99)
HCO3 BLDA-SCNC: 21 MMOL/L — SIGNIFICANT CHANGE UP (ref 21–28)
HCO3 BLDA-SCNC: 23 MMOL/L — SIGNIFICANT CHANGE UP (ref 21–28)
HCO3 BLDA-SCNC: 24 MMOL/L — SIGNIFICANT CHANGE UP (ref 21–28)
HCO3 BLDA-SCNC: 25 MMOL/L — SIGNIFICANT CHANGE UP (ref 21–28)
HCO3 BLDA-SCNC: 26 MMOL/L — SIGNIFICANT CHANGE UP (ref 21–28)
HCO3 BLDV-SCNC: 26 MMOL/L — SIGNIFICANT CHANGE UP (ref 22–29)
HCO3 BLDV-SCNC: 27 MMOL/L — SIGNIFICANT CHANGE UP (ref 22–29)
HCO3 BLDV-SCNC: 30 MMOL/L — HIGH (ref 22–29)
HCT VFR BLD CALC: 26.8 % — LOW (ref 34.5–45)
HCT VFR BLD CALC: 35.5 % — SIGNIFICANT CHANGE UP (ref 34.5–45)
HCT VFR BLDA CALC: 24 % — SIGNIFICANT CHANGE UP
HCT VFR BLDA CALC: 26 % — SIGNIFICANT CHANGE UP
HCT VFR BLDA CALC: 26 % — SIGNIFICANT CHANGE UP
HCT VFR BLDA CALC: 27 % — SIGNIFICANT CHANGE UP
HCT VFR BLDA CALC: 29 % — SIGNIFICANT CHANGE UP
HCT VFR BLDA CALC: 31 % — SIGNIFICANT CHANGE UP
HCT VFR BLDA CALC: 35 % — SIGNIFICANT CHANGE UP
HGB BLD CALC-MCNC: 8 G/DL — LOW (ref 11.7–16.1)
HGB BLD CALC-MCNC: 8.1 G/DL — LOW (ref 11.7–16.1)
HGB BLD CALC-MCNC: 8.6 G/DL — LOW (ref 11.7–16.1)
HGB BLD-MCNC: 12.1 G/DL — SIGNIFICANT CHANGE UP (ref 11.5–15.5)
HGB BLD-MCNC: 9.5 G/DL — LOW (ref 11.5–15.5)
HGB BLDA-MCNC: 10.4 G/DL — LOW (ref 11.7–16.1)
HGB BLDA-MCNC: 11.7 G/DL — SIGNIFICANT CHANGE UP (ref 11.7–16.1)
HGB BLDA-MCNC: 8.1 G/DL — LOW (ref 11.7–16.1)
HGB BLDA-MCNC: 8.5 G/DL — LOW (ref 11.7–16.1)
HGB BLDA-MCNC: 8.9 G/DL — LOW (ref 11.7–16.1)
HGB BLDA-MCNC: 8.9 G/DL — LOW (ref 11.7–16.1)
HGB BLDA-MCNC: 9 G/DL — LOW (ref 11.7–16.1)
HGB BLDA-MCNC: 9.5 G/DL — LOW (ref 11.7–16.1)
IMM GRANULOCYTES NFR BLD AUTO: 1.1 % — HIGH (ref 0–0.9)
INR BLD: 1.18 RATIO — SIGNIFICANT CHANGE UP (ref 0.85–1.18)
LACTATE BLDA-MCNC: 1.2 MMOL/L — SIGNIFICANT CHANGE UP (ref 0.5–2)
LACTATE BLDA-MCNC: 1.2 MMOL/L — SIGNIFICANT CHANGE UP (ref 0.5–2)
LACTATE BLDA-MCNC: 1.3 MMOL/L — SIGNIFICANT CHANGE UP (ref 0.5–2)
LACTATE BLDA-MCNC: 1.3 MMOL/L — SIGNIFICANT CHANGE UP (ref 0.5–2)
LACTATE BLDA-MCNC: 1.4 MMOL/L — SIGNIFICANT CHANGE UP (ref 0.5–2)
LACTATE BLDA-MCNC: 2.1 MMOL/L — HIGH (ref 0.5–2)
LACTATE BLDA-MCNC: 2.5 MMOL/L — HIGH (ref 0.5–2)
LACTATE BLDA-MCNC: 2.9 MMOL/L — HIGH (ref 0.5–2)
LACTATE BLDV-MCNC: 1.2 MMOL/L — SIGNIFICANT CHANGE UP (ref 0.5–2)
LACTATE BLDV-MCNC: 1.2 MMOL/L — SIGNIFICANT CHANGE UP (ref 0.5–2)
LACTATE BLDV-MCNC: 1.3 MMOL/L — SIGNIFICANT CHANGE UP (ref 0.5–2)
LYMPHOCYTES # BLD AUTO: 0.6 K/UL — LOW (ref 1–3.3)
LYMPHOCYTES # BLD AUTO: 7.3 % — LOW (ref 13–44)
MAGNESIUM SERPL-MCNC: 1.9 MG/DL — SIGNIFICANT CHANGE UP (ref 1.6–2.6)
MAGNESIUM SERPL-MCNC: 2.3 MG/DL — SIGNIFICANT CHANGE UP (ref 1.6–2.6)
MCHC RBC-ENTMCNC: 31.8 PG — SIGNIFICANT CHANGE UP (ref 27–34)
MCHC RBC-ENTMCNC: 33.2 PG — SIGNIFICANT CHANGE UP (ref 27–34)
MCHC RBC-ENTMCNC: 34.1 GM/DL — SIGNIFICANT CHANGE UP (ref 32–36)
MCHC RBC-ENTMCNC: 35.4 GM/DL — SIGNIFICANT CHANGE UP (ref 32–36)
MCV RBC AUTO: 93.4 FL — SIGNIFICANT CHANGE UP (ref 80–100)
MCV RBC AUTO: 93.7 FL — SIGNIFICANT CHANGE UP (ref 80–100)
METHGB MFR BLDA: 0.8 % — SIGNIFICANT CHANGE UP
METHGB MFR BLDA: 0.8 % — SIGNIFICANT CHANGE UP
METHGB MFR BLDA: 0.9 % — SIGNIFICANT CHANGE UP
METHGB MFR BLDA: 1 % — SIGNIFICANT CHANGE UP
METHGB MFR BLDA: 1 % — SIGNIFICANT CHANGE UP
METHGB MFR BLDA: 1.1 % — SIGNIFICANT CHANGE UP
METHGB MFR BLDV: 0.6 % — SIGNIFICANT CHANGE UP
METHGB MFR BLDV: 1 % — SIGNIFICANT CHANGE UP
METHGB MFR BLDV: 1 % — SIGNIFICANT CHANGE UP
MONOCYTES # BLD AUTO: 0.36 K/UL — SIGNIFICANT CHANGE UP (ref 0–0.9)
MONOCYTES NFR BLD AUTO: 4.4 % — SIGNIFICANT CHANGE UP (ref 2–14)
NEUTROPHILS # BLD AUTO: 7.13 K/UL — SIGNIFICANT CHANGE UP (ref 1.8–7.4)
NEUTROPHILS NFR BLD AUTO: 86.7 % — HIGH (ref 43–77)
OXYHGB MFR BLDA: 97 % — HIGH (ref 90–95)
OXYHGB MFR BLDA: 98 % — HIGH (ref 90–95)
PCO2 BLDA: 30 MMHG — LOW (ref 32–45)
PCO2 BLDA: 31 MMHG — LOW (ref 32–45)
PCO2 BLDA: 34 MMHG — SIGNIFICANT CHANGE UP (ref 32–45)
PCO2 BLDA: 39 MMHG — SIGNIFICANT CHANGE UP (ref 32–45)
PCO2 BLDA: 43 MMHG — SIGNIFICANT CHANGE UP (ref 32–45)
PCO2 BLDA: 43 MMHG — SIGNIFICANT CHANGE UP (ref 32–45)
PCO2 BLDA: 44 MMHG — SIGNIFICANT CHANGE UP (ref 32–45)
PCO2 BLDA: 44 MMHG — SIGNIFICANT CHANGE UP (ref 32–45)
PCO2 BLDV: 39 MMHG — SIGNIFICANT CHANGE UP (ref 39–42)
PCO2 BLDV: 40 MMHG — SIGNIFICANT CHANGE UP (ref 39–42)
PCO2 BLDV: 43 MMHG — HIGH (ref 39–42)
PH BLDA: 7.34 — LOW (ref 7.35–7.45)
PH BLDA: 7.36 — SIGNIFICANT CHANGE UP (ref 7.35–7.45)
PH BLDA: 7.37 — SIGNIFICANT CHANGE UP (ref 7.35–7.45)
PH BLDA: 7.38 — SIGNIFICANT CHANGE UP (ref 7.35–7.45)
PH BLDA: 7.41 — SIGNIFICANT CHANGE UP (ref 7.35–7.45)
PH BLDA: 7.43 — SIGNIFICANT CHANGE UP (ref 7.35–7.45)
PH BLDA: 7.47 — HIGH (ref 7.35–7.45)
PH BLDA: 7.53 — HIGH (ref 7.35–7.45)
PH BLDV: 7.42 — SIGNIFICANT CHANGE UP (ref 7.32–7.43)
PH BLDV: 7.45 — HIGH (ref 7.32–7.43)
PH BLDV: 7.45 — HIGH (ref 7.32–7.43)
PHOSPHATE SERPL-MCNC: 3 MG/DL — SIGNIFICANT CHANGE UP (ref 2.4–4.7)
PLATELET # BLD AUTO: 117 K/UL — LOW (ref 150–400)
PLATELET # BLD AUTO: 179 K/UL — SIGNIFICANT CHANGE UP (ref 150–400)
PO2 BLDA: 235 MMHG — HIGH (ref 83–108)
PO2 BLDA: 332 MMHG — HIGH (ref 83–108)
PO2 BLDA: 397 MMHG — HIGH (ref 83–108)
PO2 BLDA: 451 MMHG — HIGH (ref 83–108)
PO2 BLDA: 490 MMHG — HIGH (ref 83–108)
PO2 BLDA: >496 MMHG — HIGH (ref 83–108)
PO2 BLDV: 65 MMHG — HIGH (ref 25–45)
PO2 BLDV: 67 MMHG — HIGH (ref 25–45)
PO2 BLDV: 68 MMHG — HIGH (ref 25–45)
POTASSIUM BLDA-SCNC: 3.9 MMOL/L — SIGNIFICANT CHANGE UP (ref 3.5–5.1)
POTASSIUM BLDA-SCNC: 4.2 MMOL/L — SIGNIFICANT CHANGE UP (ref 3.5–5.1)
POTASSIUM BLDA-SCNC: 4.2 MMOL/L — SIGNIFICANT CHANGE UP (ref 3.5–5.1)
POTASSIUM BLDA-SCNC: 4.5 MMOL/L — SIGNIFICANT CHANGE UP (ref 3.5–5.1)
POTASSIUM BLDA-SCNC: 4.5 MMOL/L — SIGNIFICANT CHANGE UP (ref 3.5–5.1)
POTASSIUM BLDA-SCNC: 5.3 MMOL/L — HIGH (ref 3.5–5.1)
POTASSIUM BLDV-SCNC: 4 MMOL/L — SIGNIFICANT CHANGE UP (ref 3.5–5.1)
POTASSIUM BLDV-SCNC: 5 MMOL/L — SIGNIFICANT CHANGE UP (ref 3.5–5.1)
POTASSIUM BLDV-SCNC: 5.2 MMOL/L — HIGH (ref 3.5–5.1)
POTASSIUM SERPL-MCNC: 4.3 MMOL/L — SIGNIFICANT CHANGE UP (ref 3.5–5.3)
POTASSIUM SERPL-MCNC: 4.4 MMOL/L — SIGNIFICANT CHANGE UP (ref 3.5–5.3)
POTASSIUM SERPL-SCNC: 4.3 MMOL/L — SIGNIFICANT CHANGE UP (ref 3.5–5.3)
POTASSIUM SERPL-SCNC: 4.4 MMOL/L — SIGNIFICANT CHANGE UP (ref 3.5–5.3)
PROT SERPL-MCNC: 4.6 G/DL — LOW (ref 6.6–8.7)
PROTHROM AB SERPL-ACNC: 13 SEC — SIGNIFICANT CHANGE UP (ref 9.5–13)
RBC # BLD: 2.86 M/UL — LOW (ref 3.8–5.2)
RBC # BLD: 3.8 M/UL — SIGNIFICANT CHANGE UP (ref 3.8–5.2)
RBC # FLD: 12.5 % — SIGNIFICANT CHANGE UP (ref 10.3–14.5)
RBC # FLD: 12.8 % — SIGNIFICANT CHANGE UP (ref 10.3–14.5)
SAO2 % BLDA: 100 % — HIGH (ref 94–98)
SAO2 % BLDA: 99.4 % — HIGH (ref 94–98)
SAO2 % BLDA: 99.4 % — HIGH (ref 94–98)
SAO2 % BLDA: 99.5 % — HIGH (ref 94–98)
SAO2 % BLDA: 99.6 % — HIGH (ref 94–98)
SAO2 % BLDA: 99.6 % — HIGH (ref 94–98)
SAO2 % BLDA: 99.7 % — HIGH (ref 94–98)
SAO2 % BLDA: 99.7 % — HIGH (ref 94–98)
SAO2 % BLDV: 94.8 % — HIGH (ref 67–88)
SAO2 % BLDV: 96.1 % — HIGH (ref 67–88)
SAO2 % BLDV: 96.5 % — HIGH (ref 67–88)
SODIUM BLDA-SCNC: 131 MMOL/L — LOW (ref 136–145)
SODIUM BLDA-SCNC: 133 MMOL/L — LOW (ref 136–145)
SODIUM BLDA-SCNC: 134 MMOL/L — LOW (ref 136–145)
SODIUM BLDA-SCNC: 134 MMOL/L — LOW (ref 136–145)
SODIUM BLDA-SCNC: 135 MMOL/L — LOW (ref 136–145)
SODIUM BLDA-SCNC: 138 MMOL/L — SIGNIFICANT CHANGE UP (ref 136–145)
SODIUM SERPL-SCNC: 140 MMOL/L — SIGNIFICANT CHANGE UP (ref 135–145)
SODIUM SERPL-SCNC: 140 MMOL/L — SIGNIFICANT CHANGE UP (ref 135–145)
TROPONIN T, HIGH SENSITIVITY RESULT: 498 NG/L — HIGH (ref 0–51)
WBC # BLD: 5.62 K/UL — SIGNIFICANT CHANGE UP (ref 3.8–10.5)
WBC # BLD: 8.22 K/UL — SIGNIFICANT CHANGE UP (ref 3.8–10.5)
WBC # FLD AUTO: 5.62 K/UL — SIGNIFICANT CHANGE UP (ref 3.8–10.5)
WBC # FLD AUTO: 8.22 K/UL — SIGNIFICANT CHANGE UP (ref 3.8–10.5)

## 2024-01-31 PROCEDURE — 33464 VALVULOPLASTY TRICUSPID: CPT

## 2024-01-31 PROCEDURE — 71045 X-RAY EXAM CHEST 1 VIEW: CPT | Mod: 26

## 2024-01-31 PROCEDURE — 93312 ECHO TRANSESOPHAGEAL: CPT | Mod: 26

## 2024-01-31 PROCEDURE — 33430 REPLACEMENT OF MITRAL VALVE: CPT | Mod: AS

## 2024-01-31 PROCEDURE — 33530 CORONARY ARTERY BYPASS/REOP: CPT | Mod: AS

## 2024-01-31 PROCEDURE — 93010 ELECTROCARDIOGRAM REPORT: CPT

## 2024-01-31 PROCEDURE — 33430 REPLACEMENT OF MITRAL VALVE: CPT

## 2024-01-31 PROCEDURE — 33464 VALVULOPLASTY TRICUSPID: CPT | Mod: AS

## 2024-01-31 PROCEDURE — 33530 CORONARY ARTERY BYPASS/REOP: CPT

## 2024-01-31 PROCEDURE — 99291 CRITICAL CARE FIRST HOUR: CPT

## 2024-01-31 DEVICE — COR-KNOT MINI DEVICE COMBO KIT: Type: IMPLANTABLE DEVICE | Status: FUNCTIONAL

## 2024-01-31 DEVICE — CATH VENT VENTRICULAR PVC 18FR X 4.25" TIP PERFORATION: Type: IMPLANTABLE DEVICE | Status: FUNCTIONAL

## 2024-01-31 DEVICE — PACING WIRE WHITE M-25 WINGED WIRE 37MM X 89MM: Type: IMPLANTABLE DEVICE | Status: FUNCTIONAL

## 2024-01-31 DEVICE — ANNULOPLASTY BAND TRICUSPID TRI-AD 2.0 ADAMS 28MM: Type: IMPLANTABLE DEVICE | Status: FUNCTIONAL

## 2024-01-31 DEVICE — KIT A-LINE 1LUM 20G X 12CM SAFE KIT: Type: IMPLANTABLE DEVICE | Status: FUNCTIONAL

## 2024-01-31 DEVICE — MEDIASTINAL CATH DRAIN 9MM: Type: IMPLANTABLE DEVICE | Status: FUNCTIONAL

## 2024-01-31 DEVICE — BIOGLUE 5ML SYR: Type: IMPLANTABLE DEVICE | Status: FUNCTIONAL

## 2024-01-31 DEVICE — CANNULA RETROGRADE CARDIOPLEGIA SELF-INFLATING 14FR PRE-SHAPED STYLET/HANDLE: Type: IMPLANTABLE DEVICE | Status: FUNCTIONAL

## 2024-01-31 DEVICE — ARISTA 3GR: Type: IMPLANTABLE DEVICE | Status: FUNCTIONAL

## 2024-01-31 DEVICE — PROGEL PLEURAL AIR LEAK 4ML: Type: IMPLANTABLE DEVICE | Status: FUNCTIONAL

## 2024-01-31 DEVICE — CANNULA ATRASUMP 1/4" X 38CM: Type: IMPLANTABLE DEVICE | Status: FUNCTIONAL

## 2024-01-31 DEVICE — SHEATH INTRODUCER TERUMO PINNACLE PERIPHERAL 4FR X 10CM X 0.035" MINI WIRE: Type: IMPLANTABLE DEVICE | Status: FUNCTIONAL

## 2024-01-31 DEVICE — SURGICEL FIBRILLAR 4 X 4": Type: IMPLANTABLE DEVICE | Status: FUNCTIONAL

## 2024-01-31 DEVICE — IMPLANTABLE DEVICE: Type: IMPLANTABLE DEVICE | Status: FUNCTIONAL

## 2024-01-31 DEVICE — CANNULA VENOUS 1 STAGE RIGHT ANGLE 30FR X 3/8": Type: IMPLANTABLE DEVICE | Status: FUNCTIONAL

## 2024-01-31 DEVICE — PACING WIRE ORANGE M-25 WINGED WIRE 37MM X 89MM: Type: IMPLANTABLE DEVICE | Status: FUNCTIONAL

## 2024-01-31 DEVICE — KIT CVC 2LUM MAC 9FR CHG: Type: IMPLANTABLE DEVICE | Status: FUNCTIONAL

## 2024-01-31 DEVICE — CANNULA AORTIC ROOT 12G X 14CM FLANGED: Type: IMPLANTABLE DEVICE | Status: FUNCTIONAL

## 2024-01-31 RX ORDER — ACETAMINOPHEN 500 MG
1000 TABLET ORAL ONCE
Refills: 0 | Status: COMPLETED | OUTPATIENT
Start: 2024-02-03 | End: 2024-02-03

## 2024-01-31 RX ORDER — NOREPINEPHRINE BITARTRATE/D5W 8 MG/250ML
0.05 PLASTIC BAG, INJECTION (ML) INTRAVENOUS
Qty: 8 | Refills: 0 | Status: DISCONTINUED | OUTPATIENT
Start: 2024-01-31 | End: 2024-02-01

## 2024-01-31 RX ORDER — ACETAMINOPHEN 500 MG
1000 TABLET ORAL ONCE
Refills: 0 | Status: COMPLETED | OUTPATIENT
Start: 2024-02-03

## 2024-01-31 RX ORDER — KETOROLAC TROMETHAMINE 30 MG/ML
15 SYRINGE (ML) INJECTION EVERY 6 HOURS
Refills: 0 | Status: DISCONTINUED | OUTPATIENT
Start: 2024-01-31 | End: 2024-02-01

## 2024-01-31 RX ORDER — ASPIRIN/CALCIUM CARB/MAGNESIUM 324 MG
325 TABLET ORAL DAILY
Refills: 0 | Status: DISCONTINUED | OUTPATIENT
Start: 2024-02-01 | End: 2024-02-08

## 2024-01-31 RX ORDER — INSULIN HUMAN 100 [IU]/ML
2 INJECTION, SOLUTION SUBCUTANEOUS
Qty: 100 | Refills: 0 | Status: DISCONTINUED | OUTPATIENT
Start: 2024-01-31 | End: 2024-02-01

## 2024-01-31 RX ORDER — ASPIRIN/CALCIUM CARB/MAGNESIUM 324 MG
324 TABLET ORAL ONCE
Refills: 0 | Status: COMPLETED | OUTPATIENT
Start: 2024-01-31 | End: 2024-01-31

## 2024-01-31 RX ORDER — ALBUMIN HUMAN 25 %
250 VIAL (ML) INTRAVENOUS ONCE
Refills: 0 | Status: COMPLETED | OUTPATIENT
Start: 2024-01-31 | End: 2024-02-01

## 2024-01-31 RX ORDER — ACETAMINOPHEN 500 MG
975 TABLET ORAL EVERY 6 HOURS
Refills: 0 | Status: COMPLETED | OUTPATIENT
Start: 2024-01-31 | End: 2024-02-03

## 2024-01-31 RX ORDER — PROPOFOL 10 MG/ML
5 INJECTION, EMULSION INTRAVENOUS
Qty: 1000 | Refills: 0 | Status: DISCONTINUED | OUTPATIENT
Start: 2024-01-31 | End: 2024-01-31

## 2024-01-31 RX ORDER — ACETAMINOPHEN 500 MG
1000 TABLET ORAL ONCE
Refills: 0 | Status: COMPLETED | OUTPATIENT
Start: 2024-01-31 | End: 2024-01-31

## 2024-01-31 RX ORDER — CEFUROXIME AXETIL 250 MG
1500 TABLET ORAL EVERY 8 HOURS
Refills: 0 | Status: COMPLETED | OUTPATIENT
Start: 2024-01-31 | End: 2024-02-01

## 2024-01-31 RX ORDER — POTASSIUM CHLORIDE 20 MEQ
10 PACKET (EA) ORAL
Refills: 0 | Status: DISCONTINUED | OUTPATIENT
Start: 2024-01-31 | End: 2024-01-31

## 2024-01-31 RX ORDER — ONDANSETRON 8 MG/1
4 TABLET, FILM COATED ORAL EVERY 6 HOURS
Refills: 0 | Status: DISCONTINUED | OUTPATIENT
Start: 2024-01-31 | End: 2024-02-08

## 2024-01-31 RX ORDER — DEXTROSE 50 % IN WATER 50 %
50 SYRINGE (ML) INTRAVENOUS
Refills: 0 | Status: DISCONTINUED | OUTPATIENT
Start: 2024-01-31 | End: 2024-02-03

## 2024-01-31 RX ORDER — CHLORHEXIDINE GLUCONATE 213 G/1000ML
1 SOLUTION TOPICAL DAILY
Refills: 0 | Status: DISCONTINUED | OUTPATIENT
Start: 2024-01-31 | End: 2024-02-08

## 2024-01-31 RX ORDER — VANCOMYCIN HCL 1 G
1000 VIAL (EA) INTRAVENOUS EVERY 12 HOURS
Refills: 0 | Status: COMPLETED | OUTPATIENT
Start: 2024-01-31 | End: 2024-02-02

## 2024-01-31 RX ORDER — DEXTROSE 50 % IN WATER 50 %
25 SYRINGE (ML) INTRAVENOUS
Refills: 0 | Status: DISCONTINUED | OUTPATIENT
Start: 2024-01-31 | End: 2024-02-03

## 2024-01-31 RX ORDER — SENNA PLUS 8.6 MG/1
2 TABLET ORAL AT BEDTIME
Refills: 0 | Status: DISCONTINUED | OUTPATIENT
Start: 2024-02-01 | End: 2024-02-08

## 2024-01-31 RX ORDER — ACETAMINOPHEN 500 MG
650 TABLET ORAL EVERY 6 HOURS
Refills: 0 | Status: COMPLETED | OUTPATIENT
Start: 2024-02-03 | End: 2025-01-01

## 2024-01-31 RX ORDER — SODIUM CHLORIDE 9 MG/ML
1000 INJECTION INTRAMUSCULAR; INTRAVENOUS; SUBCUTANEOUS
Refills: 0 | Status: DISCONTINUED | OUTPATIENT
Start: 2024-01-31 | End: 2024-02-03

## 2024-01-31 RX ORDER — POLYETHYLENE GLYCOL 3350 17 G/17G
17 POWDER, FOR SOLUTION ORAL DAILY
Refills: 0 | Status: DISCONTINUED | OUTPATIENT
Start: 2024-02-01 | End: 2024-02-08

## 2024-01-31 RX ORDER — PANTOPRAZOLE SODIUM 20 MG/1
40 TABLET, DELAYED RELEASE ORAL ONCE
Refills: 0 | Status: COMPLETED | OUTPATIENT
Start: 2024-01-31 | End: 2024-01-31

## 2024-01-31 RX ORDER — GABAPENTIN 400 MG/1
100 CAPSULE ORAL EVERY 8 HOURS
Refills: 0 | Status: DISCONTINUED | OUTPATIENT
Start: 2024-02-01 | End: 2024-02-04

## 2024-01-31 RX ORDER — OXYCODONE HYDROCHLORIDE 5 MG/1
10 TABLET ORAL EVERY 4 HOURS
Refills: 0 | Status: DISCONTINUED | OUTPATIENT
Start: 2024-02-01 | End: 2024-02-05

## 2024-01-31 RX ORDER — AMIODARONE HYDROCHLORIDE 400 MG/1
400 TABLET ORAL
Refills: 0 | Status: DISCONTINUED | OUTPATIENT
Start: 2024-01-31 | End: 2024-01-31

## 2024-01-31 RX ORDER — CHLORHEXIDINE GLUCONATE 213 G/1000ML
15 SOLUTION TOPICAL EVERY 12 HOURS
Refills: 0 | Status: DISCONTINUED | OUTPATIENT
Start: 2024-01-31 | End: 2024-01-31

## 2024-01-31 RX ORDER — ASCORBIC ACID 60 MG
500 TABLET,CHEWABLE ORAL
Refills: 0 | Status: COMPLETED | OUTPATIENT
Start: 2024-01-31 | End: 2024-02-05

## 2024-01-31 RX ORDER — FENTANYL CITRATE 50 UG/ML
25 INJECTION INTRAVENOUS ONCE
Refills: 0 | Status: DISCONTINUED | OUTPATIENT
Start: 2024-01-31 | End: 2024-01-31

## 2024-01-31 RX ORDER — SODIUM CHLORIDE 9 MG/ML
500 INJECTION, SOLUTION INTRAVENOUS ONCE
Refills: 0 | Status: COMPLETED | OUTPATIENT
Start: 2024-01-31 | End: 2024-01-31

## 2024-01-31 RX ORDER — OXYCODONE HYDROCHLORIDE 5 MG/1
5 TABLET ORAL EVERY 4 HOURS
Refills: 0 | Status: DISCONTINUED | OUTPATIENT
Start: 2024-02-01 | End: 2024-02-05

## 2024-01-31 RX ORDER — MUPIROCIN 20 MG/G
1 OINTMENT TOPICAL
Refills: 0 | Status: DISCONTINUED | OUTPATIENT
Start: 2024-01-31 | End: 2024-02-03

## 2024-01-31 RX ORDER — PANTOPRAZOLE SODIUM 20 MG/1
40 TABLET, DELAYED RELEASE ORAL DAILY
Refills: 0 | Status: DISCONTINUED | OUTPATIENT
Start: 2024-02-01 | End: 2024-02-08

## 2024-01-31 RX ADMIN — FENTANYL CITRATE 25 MICROGRAM(S): 50 INJECTION INTRAVENOUS at 21:50

## 2024-01-31 RX ADMIN — CHLORHEXIDINE GLUCONATE 15 MILLILITER(S): 213 SOLUTION TOPICAL at 18:27

## 2024-01-31 RX ADMIN — PANTOPRAZOLE SODIUM 40 MILLIGRAM(S): 20 TABLET, DELAYED RELEASE ORAL at 18:27

## 2024-01-31 RX ADMIN — SODIUM CHLORIDE 3 MILLILITER(S): 9 INJECTION INTRAMUSCULAR; INTRAVENOUS; SUBCUTANEOUS at 05:03

## 2024-01-31 RX ADMIN — CHLORHEXIDINE GLUCONATE 15 MILLILITER(S): 213 SOLUTION TOPICAL at 05:17

## 2024-01-31 RX ADMIN — Medication 324 MILLIGRAM(S): at 18:30

## 2024-01-31 RX ADMIN — FENTANYL CITRATE 25 MICROGRAM(S): 50 INJECTION INTRAVENOUS at 22:50

## 2024-01-31 RX ADMIN — Medication 250 MILLIGRAM(S): at 19:30

## 2024-01-31 RX ADMIN — Medication 400 MILLIGRAM(S): at 20:55

## 2024-01-31 RX ADMIN — Medication 100 MILLIGRAM(S): at 18:27

## 2024-01-31 RX ADMIN — AMIODARONE HYDROCHLORIDE 400 MILLIGRAM(S): 400 TABLET ORAL at 18:28

## 2024-01-31 RX ADMIN — Medication 1000 MILLIGRAM(S): at 21:55

## 2024-01-31 RX ADMIN — Medication 100 MILLIGRAM(S): at 23:42

## 2024-01-31 RX ADMIN — MUPIROCIN 1 APPLICATION(S): 20 OINTMENT TOPICAL at 18:27

## 2024-01-31 RX ADMIN — SODIUM CHLORIDE 3000 MILLILITER(S): 9 INJECTION, SOLUTION INTRAVENOUS at 13:50

## 2024-01-31 RX ADMIN — CHLORHEXIDINE GLUCONATE 1 APPLICATION(S): 213 SOLUTION TOPICAL at 13:46

## 2024-01-31 RX ADMIN — CHLORHEXIDINE GLUCONATE 1 APPLICATION(S): 213 SOLUTION TOPICAL at 05:24

## 2024-01-31 NOTE — BRIEF OPERATIVE NOTE - NSICDXBRIEFPREOP_GEN_ALL_CORE_FT
PRE-OP DIAGNOSIS:  Mitral stenosis 31-Jan-2024 13:22:12  Rosa Zhu  Tricuspid regurgitation 31-Jan-2024 13:22:24  Rosa Zhu

## 2024-01-31 NOTE — BRIEF OPERATIVE NOTE - NSICDXBRIEFPOSTOP_GEN_ALL_CORE_FT
POST-OP DIAGNOSIS:  Mitral stenosis 31-Jan-2024 13:22:35  Rosa Zhu  Tricuspid regurgitation 31-Jan-2024 13:22:45  Rosa Zhu

## 2024-01-31 NOTE — PROGRESS NOTE ADULT - SUBJECTIVE AND OBJECTIVE BOX
GLORY MULLINS  MRN-[id number]    HPI:  67 y/o female with PMH of HTN, HLD, left breast cancer (s/p XRT and lumpectomy; 2009), pAF (on Eliquis) and Mitral valve stenosis s/p Mitral valve replacement #29 Perimount Giovanna Jackson with MAZE ablation (April 2013 Dr Zhang [facility]) presents to PST with complaints of worsening shortness of breath. States in 06/2023 she started to notice worsening shortness of breath and STEWART which has gotten progressively worse since. She has also had 2 episodes of atrial fibrillation with cardioversions since 06/2023 with most recent cardioversion 01/2024. She was recently admitted to [facility] with complaints of 3 weeks of worsening SOB and STEWART, acute on chronic diastolic CHF exacerbation, with SARINA showing severe prosthetic mitral stenosis and severe TR, s/p LHC with no obstructive CAD noted. Denies chest pain, palpitations, dizziness, lightheadedness or near syncope. Patient is scheduled for mitral valve repair, possible replace, tricuspid valve replacement with Dr. Jimenez on 1/31/23, to be pre- admitted on 1/30/24.  (18 Jan 2024 09:29)      Surgery/Hospital Course:  ·  PRE-OP DIAGNOSIS:  Mitral stenosis   Tricuspid regurgitation   ·  POST-OP DIAGNOSIS:  Mitral stenosis   Tricuspid regurgitation  ·  PROCEDURES:  Repeat sternotomy 31-Jan-2024   Replacement, mitral valve, with SARINA, adult, 29mm Pizarro Mitral Valve   Repair of tricuspid valve in adult , 28mm Triad Ring -  Today:  No acute events     ICU Vital Signs Last 24 Hrs  T(C): 36.5 (31 Jan 2024 17:00), Max: 36.8 (31 Jan 2024 05:05)  T(F): 97.7 (31 Jan 2024 17:00), Max: 98.2 (31 Jan 2024 05:05)  HR: 62 (31 Jan 2024 17:00) (52 - 81)  BP: 159/77 (31 Jan 2024 13:00) (107/55 - 159/77)  BP(mean): 110 (31 Jan 2024 13:00) (110 - 110)  ABP: 108/59 (31 Jan 2024 17:00) (94/55 - 158/74)  ABP(mean): 77 (31 Jan 2024 17:00) (70 - 106)  RR: 12 (31 Jan 2024 17:00) (12 - 53)  SpO2: 100% (31 Jan 2024 17:00) (95% - 100%)    O2 Parameters below as of 31 Jan 2024 17:00  Patient On (Oxygen Delivery Method): ventilator    O2 Concentration (%): 40        Physical Exam:  Gen: Sedated  CNS: non focal 	  Neck: no JVD  RES : clear , no wheezing              CVS: Regular  rhythm. Normal S1/S2  Abd: Soft, non-distended. Bowel sounds present.  Skin: No rash.  Ext:  no edema    ============================I/O===========================   I&O's Detail    31 Jan 2024 07:01  -  31 Jan 2024 17:21  --------------------------------------------------------  IN:    Lactated Ringers Bolus: 500 mL    Norepinephrine: 12 mL    PRBCs (Packed Red Blood Cells): 315 mL    Propofol: 65 mL    sodium chloride 0.9%: 25 mL    sodium chloride 0.9%: 50 mL  Total IN: 967 mL    OUT:    Chest Tube (mL): 160 mL    Indwelling Catheter - Urethral (mL): 600 mL    Insulin: 0 mL  Total OUT: 760 mL    Total NET: 207 mL        ============================ LABS =========================                        9.5    8.22  )-----------( 117      ( 31 Jan 2024 13:15 )             26.8     01-31    140  |  105  |  16.9  ----------------------------<  130<H>  4.4   |  21.0<L>  |  0.87    Ca    8.3<L>      31 Jan 2024 13:15  Phos  3.0     01-31  Mg     2.3     01-31    TPro  4.6<L>  /  Alb  3.2<L>  /  TBili  0.6  /  DBili  x   /  AST  27  /  ALT  12  /  AlkPhos  52  01-31    LIVER FUNCTIONS - ( 31 Jan 2024 13:15 )  Alb: 3.2 g/dL / Pro: 4.6 g/dL / ALK PHOS: 52 U/L / ALT: 12 U/L / AST: 27 U/L / GGT: x           PT/INR - ( 31 Jan 2024 13:15 )   PT: 13.0 sec;   INR: 1.18 ratio         PTT - ( 31 Jan 2024 13:15 )  PTT:28.2 sec  ABG - ( 31 Jan 2024 13:14 )  pH, Arterial: 7.380 pH, Blood: x     /  pCO2: 41    /  pO2: 140   / HCO3: 24    / Base Excess: -0.8  /  SaO2: 100.0             Urinalysis Basic - ( 31 Jan 2024 13:15 )    Color: x / Appearance: x / SG: x / pH: x  Gluc: 130 mg/dL / Ketone: x  / Bili: x / Urobili: x   Blood: x / Protein: x / Nitrite: x   Leuk Esterase: x / RBC: x / WBC x   Sq Epi: x / Non Sq Epi: x / Bacteria: x      ======================Micro/Rad/Cardio=================  Culture: Reviewed   CXR: Reviewed  Echo:Reviewed  ======================================================  PAST MEDICAL & SURGICAL HISTORY:  Afib      Breast cancer  left; s/p lumpectomy and XRT      Mitral valve insufficiency      Tricuspid valve insufficiency      HTN (hypertension)      HLD (hyperlipidemia)      S/P breast lumpectomy  left      H/O prosthetic mitral valve  bioprosthetic bovine 2013      History of cardiac cath      H/O right wrist surgery        ====================ASSESSMENT ==============  67 y/o female with PMH of HTN, HLD, left breast cancer (s/p XRT and lumpectomy; 2009), pAF (on Eliquis) and Mitral valve stenosis s/p Mitral valve replacement #29 Perimount Giovanna Jackson with MAZE ablation (April 2013 Dr Zhang [facility]) presents to PST with complaints of worsening shortness of breath. States in 06/2023 she started to notice worsening shortness of breath and STEWART which has gotten progressively worse since. She has also had 2 episodes of atrial fibrillation with cardioversions since 06/2023 with most recent cardioversion 01/2024. She was recently admitted to [facility] with complaints of 3 weeks of worsening SOB and STEWART, acute on chronic diastolic CHF exacerbation, with SARINA showing severe prosthetic mitral stenosis and severe TR, s/p LHC with no obstructive CAD noted. Denies chest pain, palpitations, dizziness, lightheadedness or near syncope. Patient is scheduled for mitral valve repair, possible replace, tricuspid valve replacement with Dr. Jimenez on 1/31/23, to be pre- admitted on 1/30/24.  (18 Jan 2024 09:29)    ---Afib  ---Breast cancer  ---HTN (hypertension)  ---HLD (hyperlipidemia)  ---H/O prosthetic mitral valve  ---History of cardiac cath  ---Mitral stenosis   ---Tricuspid regurgitation  ---S/p Repeat sternotomy 31-Jan-2024               Replacement, mitral valve, with SARINA, adult, 29mm Pizarro Mitral Valve               Repair of tricuspid valve in adult , 28mm Triad Ring -  ---Post op Hypovolemia  ---Post op respiratory insufficiency       Plan:  ====================== NEUROLOGY=====================  acetaminophen     Tablet .. 975 milliGRAM(s) Oral every 6 hours  propofol Infusion 5 MICROgram(s)/kG/Min (2.17 mL/Hr) IV Continuous <Continuous>    ==================== RESPIRATORY======================  Post op respiratory insufficiency  Mechanical Ventilation:  Mode: AC/ CMV (Assist Control/ Continuous Mandatory Ventilation)  RR (machine): 12  TV (machine): 480  FiO2: 40  PEEP: 6  MAP: 9  PIP: 22      ====================CARDIOVASCULAR==================  Post op Hypovolemia  aMIOdarone    Tablet 400 milliGRAM(s) Oral two times a day  norepinephrine Infusion 0.05 MICROgram(s)/kG/Min (6.78 mL/Hr) IV Continuous <Continuous>    ===================HEMATOLOGIC/ONC ===================  Monitor H&H/Plts    aspirin  chewable 324 milliGRAM(s) Oral once    ===================== RENAL =========================  Continue monitoring urine output, I&OS, BUN/Cr     ==================== GASTROINTESTINAL===================  ascorbic acid 500 milliGRAM(s) Oral two times a day  pantoprazole  Injectable 40 milliGRAM(s) IV Push once  potassium chloride  10 mEq/50 mL IVPB 10 milliEquivalent(s) IV Intermittent every 1 hour  potassium chloride  10 mEq/50 mL IVPB 10 milliEquivalent(s) IV Intermittent every 1 hour  potassium chloride  10 mEq/50 mL IVPB 10 milliEquivalent(s) IV Intermittent every 1 hour  sodium chloride 0.9%. 1000 milliLiter(s) (10 mL/Hr) IV Continuous <Continuous>  sodium chloride 0.9%. 1000 milliLiter(s) (5 mL/Hr) IV Continuous <Continuous>    =======================    ENDOCRINE  =====================  dextrose 50% Injectable 50 milliLiter(s) IV Push every 15 minutes  dextrose 50% Injectable 25 milliLiter(s) IV Push every 15 minutes  insulin regular Infusion 2 Unit(s)/Hr (2 mL/Hr) IV Continuous <Continuous>    ========================INFECTIOUS DISEASE================  cefuroxime  IVPB 1500 milliGRAM(s) IV Intermittent every 8 hours  vancomycin  IVPB 1000 milliGRAM(s) IV Intermittent every 12 hours      -Close hemodynamic , ventilatory and drain monitoring and management per post op routine .  -Monitor Neurologic status ,   -Head of the bed should remain elevated to 45 degrees,  -Monitor adequacy of oxygenation and ventilation and attempt to wean oxygen ,  -Monitor for arrhythmias and monitor parameters for organ perfusion,  -Glycemic control is satisfactory,  -Nutritional goals will be met using po eventually , insure adequate caloric intake and monitor the same ,  -Electrolytes have been repleted as necessary , pain control has been achieved  and wound care has been carried out ,  -Stress ulcer and VTE prophylaxis will be achieved,  -Agressive PT and early mobility and ambulation goals will be met,  -The family was updated about the course and plan .      I have spent 35 minutes providing acute care for this critically ill patient     Patient requires continuous monitoring with bedside rhythm monitoring, pulse ox monitoring, and intermittent blood gas analysis. Care plan discussed with ICU care team. Patient remained critical and at risk for life threatening decompensation.            GLORY MULLINS  MRN-[id number]    HPI:  69 y/o female with PMH of HTN, HLD, left breast cancer (s/p XRT and lumpectomy; 2009), pAF (on Eliquis) and Mitral valve stenosis s/p Mitral valve replacement #29 Perimount Giovanna Jackson with MAZE ablation (April 2013 Dr Zhang [facility]) presents to PST with complaints of worsening shortness of breath. States in 06/2023 she started to notice worsening shortness of breath and STEWART which has gotten progressively worse since. She has also had 2 episodes of atrial fibrillation with cardioversions since 06/2023 with most recent cardioversion 01/2024. She was recently admitted to [facility] with complaints of 3 weeks of worsening SOB and STEWART, acute on chronic diastolic CHF exacerbation, with SARINA showing severe prosthetic mitral stenosis and severe TR, s/p LHC with no obstructive CAD noted. Denies chest pain, palpitations, dizziness, lightheadedness or near syncope. Patient is scheduled for mitral valve repair, possible replace, tricuspid valve replacement with Dr. Jimenez on 1/31/23, to be pre- admitted on 1/30/24.  (18 Jan 2024 09:29)      Surgery/Hospital Course:  ·  PRE-OP DIAGNOSIS:  Mitral stenosis   Tricuspid regurgitation   ·  POST-OP DIAGNOSIS:  Mitral stenosis   Tricuspid regurgitation  ·  PROCEDURES:  Repeat sternotomy 31-Jan-2024   Replacement, mitral valve, with SARINA, adult, 29mm Pizarro Mitral Valve   Repair of tricuspid valve in adult , 28mm Triad Ring -  Today:  No acute events     ICU Vital Signs Last 24 Hrs  T(C): 36.5 (31 Jan 2024 17:00), Max: 36.8 (31 Jan 2024 05:05)  T(F): 97.7 (31 Jan 2024 17:00), Max: 98.2 (31 Jan 2024 05:05)  HR: 62 (31 Jan 2024 17:00) (52 - 81)  BP: 159/77 (31 Jan 2024 13:00) (107/55 - 159/77)  BP(mean): 110 (31 Jan 2024 13:00) (110 - 110)  ABP: 108/59 (31 Jan 2024 17:00) (94/55 - 158/74)  ABP(mean): 77 (31 Jan 2024 17:00) (70 - 106)  RR: 12 (31 Jan 2024 17:00) (12 - 53)  SpO2: 100% (31 Jan 2024 17:00) (95% - 100%)    O2 Parameters below as of 31 Jan 2024 17:00  Patient On (Oxygen Delivery Method): ventilator    O2 Concentration (%): 40        Physical Exam:  Gen: Sedated  CNS: non focal 	  Neck: no JVD  RES : clear , no wheezing              CVS: Regular  rhythm. Normal S1/S2  Abd: Soft, non-distended. Bowel sounds present.  Skin: No rash.  Ext:  no edema    ============================I/O===========================   I&O's Detail    31 Jan 2024 07:01  -  31 Jan 2024 17:21  --------------------------------------------------------  IN:    Lactated Ringers Bolus: 500 mL    Norepinephrine: 12 mL    PRBCs (Packed Red Blood Cells): 315 mL    Propofol: 65 mL    sodium chloride 0.9%: 25 mL    sodium chloride 0.9%: 50 mL  Total IN: 967 mL    OUT:    Chest Tube (mL): 160 mL    Indwelling Catheter - Urethral (mL): 600 mL    Insulin: 0 mL  Total OUT: 760 mL    Total NET: 207 mL        ============================ LABS =========================                        9.5    8.22  )-----------( 117      ( 31 Jan 2024 13:15 )             26.8     01-31    140  |  105  |  16.9  ----------------------------<  130<H>  4.4   |  21.0<L>  |  0.87    Ca    8.3<L>      31 Jan 2024 13:15  Phos  3.0     01-31  Mg     2.3     01-31    TPro  4.6<L>  /  Alb  3.2<L>  /  TBili  0.6  /  DBili  x   /  AST  27  /  ALT  12  /  AlkPhos  52  01-31    LIVER FUNCTIONS - ( 31 Jan 2024 13:15 )  Alb: 3.2 g/dL / Pro: 4.6 g/dL / ALK PHOS: 52 U/L / ALT: 12 U/L / AST: 27 U/L / GGT: x           PT/INR - ( 31 Jan 2024 13:15 )   PT: 13.0 sec;   INR: 1.18 ratio         PTT - ( 31 Jan 2024 13:15 )  PTT:28.2 sec  ABG - ( 31 Jan 2024 13:14 )  pH, Arterial: 7.380 pH, Blood: x     /  pCO2: 41    /  pO2: 140   / HCO3: 24    / Base Excess: -0.8  /  SaO2: 100.0             Urinalysis Basic - ( 31 Jan 2024 13:15 )    Color: x / Appearance: x / SG: x / pH: x  Gluc: 130 mg/dL / Ketone: x  / Bili: x / Urobili: x   Blood: x / Protein: x / Nitrite: x   Leuk Esterase: x / RBC: x / WBC x   Sq Epi: x / Non Sq Epi: x / Bacteria: x      ======================Micro/Rad/Cardio=================  Culture: Reviewed   CXR: Reviewed  Echo:Reviewed  ======================================================  PAST MEDICAL & SURGICAL HISTORY:  Afib      Breast cancer  left; s/p lumpectomy and XRT      Mitral valve insufficiency      Tricuspid valve insufficiency      HTN (hypertension)      HLD (hyperlipidemia)      S/P breast lumpectomy  left      H/O prosthetic mitral valve  bioprosthetic bovine 2013      History of cardiac cath      H/O right wrist surgery        ====================ASSESSMENT ==============  69 y/o female with PMH of HTN, HLD, left breast cancer (s/p XRT and lumpectomy; 2009), pAF (on Eliquis) and Mitral valve stenosis s/p Mitral valve replacement #29 Perimount Giovanna Jackson with MAZE ablation (April 2013 Dr Zhang [facility]) presents to PST with complaints of worsening shortness of breath. States in 06/2023 she started to notice worsening shortness of breath and STEWART which has gotten progressively worse since. She has also had 2 episodes of atrial fibrillation with cardioversions since 06/2023 with most recent cardioversion 01/2024. She was recently admitted to [facility] with complaints of 3 weeks of worsening SOB and STEWART, acute on chronic diastolic CHF exacerbation, with SARINA showing severe prosthetic mitral stenosis and severe TR, s/p LHC with no obstructive CAD noted. Denies chest pain, palpitations, dizziness, lightheadedness or near syncope. Patient is scheduled for mitral valve repair, possible replace, tricuspid valve replacement with Dr. Jimenez on 1/31/23, to be pre- admitted on 1/30/24.  (18 Jan 2024 09:29)    ---Afib  ---Breast cancer  ---HTN (hypertension)  ---HLD (hyperlipidemia)  ---H/O prosthetic mitral valve  ---History of cardiac cath  ---Mitral stenosis   ---Tricuspid regurgitation  ---S/p Repeat sternotomy 31-Jan-2024               Replacement, mitral valve, with SARINA, adult, 29mm Pizarro Mitral Valve               Repair of tricuspid valve in adult , 28mm Triad Ring -  ---Post op Hypovolemic shock  ---Post op respiratory insufficiency       Plan:  ====================== NEUROLOGY=====================  acetaminophen     Tablet .. 975 milliGRAM(s) Oral every 6 hours  propofol Infusion 5 MICROgram(s)/kG/Min (2.17 mL/Hr) IV Continuous <Continuous>    ==================== RESPIRATORY======================  Post op respiratory insufficiency  Mechanical Ventilation:  Mode: AC/ CMV (Assist Control/ Continuous Mandatory Ventilation)  RR (machine): 12  TV (machine): 480  FiO2: 40  PEEP: 6  MAP: 9  PIP: 22      ====================CARDIOVASCULAR==================  Post op Hypovolemia  aMIOdarone    Tablet 400 milliGRAM(s) Oral two times a day  norepinephrine Infusion 0.05 MICROgram(s)/kG/Min (6.78 mL/Hr) IV Continuous <Continuous>    ===================HEMATOLOGIC/ONC ===================  Monitor H&H/Plts    aspirin  chewable 324 milliGRAM(s) Oral once    ===================== RENAL =========================  Continue monitoring urine output, I&OS, BUN/Cr     ==================== GASTROINTESTINAL===================  ascorbic acid 500 milliGRAM(s) Oral two times a day  pantoprazole  Injectable 40 milliGRAM(s) IV Push once  potassium chloride  10 mEq/50 mL IVPB 10 milliEquivalent(s) IV Intermittent every 1 hour  potassium chloride  10 mEq/50 mL IVPB 10 milliEquivalent(s) IV Intermittent every 1 hour  potassium chloride  10 mEq/50 mL IVPB 10 milliEquivalent(s) IV Intermittent every 1 hour  sodium chloride 0.9%. 1000 milliLiter(s) (10 mL/Hr) IV Continuous <Continuous>  sodium chloride 0.9%. 1000 milliLiter(s) (5 mL/Hr) IV Continuous <Continuous>    =======================    ENDOCRINE  =====================  dextrose 50% Injectable 50 milliLiter(s) IV Push every 15 minutes  dextrose 50% Injectable 25 milliLiter(s) IV Push every 15 minutes  insulin regular Infusion 2 Unit(s)/Hr (2 mL/Hr) IV Continuous <Continuous>    ========================INFECTIOUS DISEASE================  cefuroxime  IVPB 1500 milliGRAM(s) IV Intermittent every 8 hours  vancomycin  IVPB 1000 milliGRAM(s) IV Intermittent every 12 hours      -Close hemodynamic , ventilatory and drain monitoring and management per post op routine .  -Monitor Neurologic status ,   -Head of the bed should remain elevated to 45 degrees,  -Monitor adequacy of oxygenation and ventilation and attempt to wean oxygen ,  -Monitor for arrhythmias and monitor parameters for organ perfusion,  -Glycemic control is satisfactory,  -Nutritional goals will be met using po eventually , insure adequate caloric intake and monitor the same ,  -Electrolytes have been repleted as necessary , pain control has been achieved  and wound care has been carried out ,  -Stress ulcer and VTE prophylaxis will be achieved,  -Agressive PT and early mobility and ambulation goals will be met,  -The family was updated about the course and plan .      I have spent 35 minutes providing acute care for this critically ill patient     Patient requires continuous monitoring with bedside rhythm monitoring, pulse ox monitoring, and intermittent blood gas analysis. Care plan discussed with ICU care team. Patient remained critical and at risk for life threatening decompensation.

## 2024-01-31 NOTE — BRIEF OPERATIVE NOTE - COMMENTS
No qualified resident was available to assist in this case. I have personally first assisted the Cardiothoracic Surgeon listed on this brief op note throughout the entirety of this case.   Unable to quantify blood loss amount due to intra-op cell saver use.

## 2024-01-31 NOTE — PRE-OP CHECKLIST - AS BP NONINV SITE
Debbie Marie, patient's mother is calling to ask whether her son's Metadate medication will be changed or not based on the teacher questionnaire responses. She would like you to reply to her question via [employer].     Thanks,     Destiny    right upper arm

## 2024-01-31 NOTE — BRIEF OPERATIVE NOTE - NSICDXBRIEFPROCEDURE_GEN_ALL_CORE_FT
PROCEDURES:  Repeat sternotomy 31-Jan-2024 13:20:02  Rosa Zhu  Replacement, mitral valve, with SARINA, adult 31-Jan-2024 13:20:16  Rosa Zhu  Repair of tricuspid valve in adult 31-Jan-2024 13:21:26  Rosa Zhu   PROCEDURES:  Repeat sternotomy 31-Jan-2024 13:20:02  Rosa Zhu  Replacement, mitral valve, with SARINA, adult 31-Jan-2024 13:20:16 29mm Pizarro Mitral Valve Rosa Zhu  Repair of tricuspid valve in adult 31-Jan-2024 13:21:26 28mm Triad Ring Rosa Zhu

## 2024-02-01 ENCOUNTER — RESULT REVIEW (OUTPATIENT)
Age: 69
End: 2024-02-01

## 2024-02-01 DIAGNOSIS — I10 ESSENTIAL (PRIMARY) HYPERTENSION: ICD-10-CM

## 2024-02-01 DIAGNOSIS — Z29.9 ENCOUNTER FOR PROPHYLACTIC MEASURES, UNSPECIFIED: ICD-10-CM

## 2024-02-01 DIAGNOSIS — E78.5 HYPERLIPIDEMIA, UNSPECIFIED: ICD-10-CM

## 2024-02-01 LAB
ALBUMIN SERPL ELPH-MCNC: 3.6 G/DL — SIGNIFICANT CHANGE UP (ref 3.3–5.2)
ALP SERPL-CCNC: 58 U/L — SIGNIFICANT CHANGE UP (ref 40–120)
ALT FLD-CCNC: 14 U/L — SIGNIFICANT CHANGE UP
ANION GAP SERPL CALC-SCNC: 12 MMOL/L — SIGNIFICANT CHANGE UP (ref 5–17)
APTT BLD: 25.7 SEC — SIGNIFICANT CHANGE UP (ref 24.5–35.6)
AST SERPL-CCNC: 36 U/L — HIGH
BASOPHILS # BLD AUTO: 0.01 K/UL — SIGNIFICANT CHANGE UP (ref 0–0.2)
BASOPHILS NFR BLD AUTO: 0.1 % — SIGNIFICANT CHANGE UP (ref 0–2)
BILIRUB SERPL-MCNC: 0.8 MG/DL — SIGNIFICANT CHANGE UP (ref 0.4–2)
BUN SERPL-MCNC: 20.1 MG/DL — HIGH (ref 8–20)
CALCIUM SERPL-MCNC: 8.3 MG/DL — LOW (ref 8.4–10.5)
CHLORIDE SERPL-SCNC: 108 MMOL/L — SIGNIFICANT CHANGE UP (ref 96–108)
CK MB CFR SERPL CALC: 14.1 NG/ML — HIGH (ref 0–6.7)
CK SERPL-CCNC: 187 U/L — HIGH (ref 25–170)
CO2 SERPL-SCNC: 22 MMOL/L — SIGNIFICANT CHANGE UP (ref 22–29)
CREAT SERPL-MCNC: 0.97 MG/DL — SIGNIFICANT CHANGE UP (ref 0.5–1.3)
EGFR: 64 ML/MIN/1.73M2 — SIGNIFICANT CHANGE UP
EOSINOPHIL # BLD AUTO: 0.01 K/UL — SIGNIFICANT CHANGE UP (ref 0–0.5)
EOSINOPHIL NFR BLD AUTO: 0.1 % — SIGNIFICANT CHANGE UP (ref 0–6)
GLUCOSE BLDC GLUCOMTR-MCNC: 118 MG/DL — HIGH (ref 70–99)
GLUCOSE BLDC GLUCOMTR-MCNC: 118 MG/DL — HIGH (ref 70–99)
GLUCOSE BLDC GLUCOMTR-MCNC: 121 MG/DL — HIGH (ref 70–99)
GLUCOSE BLDC GLUCOMTR-MCNC: 124 MG/DL — HIGH (ref 70–99)
GLUCOSE BLDC GLUCOMTR-MCNC: 128 MG/DL — HIGH (ref 70–99)
GLUCOSE BLDC GLUCOMTR-MCNC: 132 MG/DL — HIGH (ref 70–99)
GLUCOSE BLDC GLUCOMTR-MCNC: 157 MG/DL — HIGH (ref 70–99)
GLUCOSE SERPL-MCNC: 132 MG/DL — HIGH (ref 70–99)
HCT VFR BLD CALC: 30.3 % — LOW (ref 34.5–45)
HGB BLD-MCNC: 10.1 G/DL — LOW (ref 11.5–15.5)
IMM GRANULOCYTES NFR BLD AUTO: 0.5 % — SIGNIFICANT CHANGE UP (ref 0–0.9)
INR BLD: 1.1 RATIO — SIGNIFICANT CHANGE UP (ref 0.85–1.18)
LYMPHOCYTES # BLD AUTO: 0.35 K/UL — LOW (ref 1–3.3)
LYMPHOCYTES # BLD AUTO: 4.3 % — LOW (ref 13–44)
MAGNESIUM SERPL-MCNC: 2 MG/DL — SIGNIFICANT CHANGE UP (ref 1.6–2.6)
MCHC RBC-ENTMCNC: 30.3 PG — SIGNIFICANT CHANGE UP (ref 27–34)
MCHC RBC-ENTMCNC: 33.3 GM/DL — SIGNIFICANT CHANGE UP (ref 32–36)
MCV RBC AUTO: 91 FL — SIGNIFICANT CHANGE UP (ref 80–100)
MONOCYTES # BLD AUTO: 0.56 K/UL — SIGNIFICANT CHANGE UP (ref 0–0.9)
MONOCYTES NFR BLD AUTO: 6.8 % — SIGNIFICANT CHANGE UP (ref 2–14)
NEUTROPHILS # BLD AUTO: 7.22 K/UL — SIGNIFICANT CHANGE UP (ref 1.8–7.4)
NEUTROPHILS NFR BLD AUTO: 88.2 % — HIGH (ref 43–77)
PLATELET # BLD AUTO: 127 K/UL — LOW (ref 150–400)
POTASSIUM SERPL-MCNC: 4.5 MMOL/L — SIGNIFICANT CHANGE UP (ref 3.5–5.3)
POTASSIUM SERPL-SCNC: 4.5 MMOL/L — SIGNIFICANT CHANGE UP (ref 3.5–5.3)
PROT SERPL-MCNC: 5.3 G/DL — LOW (ref 6.6–8.7)
PROTHROM AB SERPL-ACNC: 12.2 SEC — SIGNIFICANT CHANGE UP (ref 9.5–13)
RBC # BLD: 3.33 M/UL — LOW (ref 3.8–5.2)
RBC # FLD: 13.4 % — SIGNIFICANT CHANGE UP (ref 10.3–14.5)
SODIUM SERPL-SCNC: 142 MMOL/L — SIGNIFICANT CHANGE UP (ref 135–145)
TROPONIN T, HIGH SENSITIVITY RESULT: 385 NG/L — HIGH (ref 0–51)
WBC # BLD: 8.19 K/UL — SIGNIFICANT CHANGE UP (ref 3.8–10.5)
WBC # FLD AUTO: 8.19 K/UL — SIGNIFICANT CHANGE UP (ref 3.8–10.5)

## 2024-02-01 PROCEDURE — 93010 ELECTROCARDIOGRAM REPORT: CPT

## 2024-02-01 PROCEDURE — 71045 X-RAY EXAM CHEST 1 VIEW: CPT | Mod: 26

## 2024-02-01 PROCEDURE — 88305 TISSUE EXAM BY PATHOLOGIST: CPT | Mod: 26

## 2024-02-01 PROCEDURE — 99291 CRITICAL CARE FIRST HOUR: CPT

## 2024-02-01 RX ORDER — ALBUMIN HUMAN 25 %
250 VIAL (ML) INTRAVENOUS ONCE
Refills: 0 | Status: COMPLETED | OUTPATIENT
Start: 2024-02-01 | End: 2024-02-01

## 2024-02-01 RX ORDER — DEXTROSE 50 % IN WATER 50 %
15 SYRINGE (ML) INTRAVENOUS ONCE
Refills: 0 | Status: DISCONTINUED | OUTPATIENT
Start: 2024-02-01 | End: 2024-02-03

## 2024-02-01 RX ORDER — INSULIN LISPRO 100/ML
VIAL (ML) SUBCUTANEOUS
Refills: 0 | Status: DISCONTINUED | OUTPATIENT
Start: 2024-02-01 | End: 2024-02-05

## 2024-02-01 RX ORDER — MAGNESIUM SULFATE 500 MG/ML
2 VIAL (ML) INJECTION ONCE
Refills: 0 | Status: COMPLETED | OUTPATIENT
Start: 2024-02-01 | End: 2024-02-01

## 2024-02-01 RX ORDER — SODIUM CHLORIDE 9 MG/ML
1000 INJECTION, SOLUTION INTRAVENOUS
Refills: 0 | Status: DISCONTINUED | OUTPATIENT
Start: 2024-02-01 | End: 2024-02-03

## 2024-02-01 RX ORDER — INSULIN LISPRO 100/ML
2 VIAL (ML) SUBCUTANEOUS
Refills: 0 | Status: DISCONTINUED | OUTPATIENT
Start: 2024-02-01 | End: 2024-02-01

## 2024-02-01 RX ORDER — ACETAMINOPHEN 500 MG
1000 TABLET ORAL ONCE
Refills: 0 | Status: COMPLETED | OUTPATIENT
Start: 2024-02-01 | End: 2024-02-01

## 2024-02-01 RX ORDER — GLUCAGON INJECTION, SOLUTION 0.5 MG/.1ML
1 INJECTION, SOLUTION SUBCUTANEOUS ONCE
Refills: 0 | Status: DISCONTINUED | OUTPATIENT
Start: 2024-02-01 | End: 2024-02-03

## 2024-02-01 RX ORDER — ENOXAPARIN SODIUM 100 MG/ML
40 INJECTION SUBCUTANEOUS EVERY 24 HOURS
Refills: 0 | Status: DISCONTINUED | OUTPATIENT
Start: 2024-02-01 | End: 2024-02-06

## 2024-02-01 RX ADMIN — Medication 2 UNIT(S): at 07:56

## 2024-02-01 RX ADMIN — Medication 100 MILLIGRAM(S): at 16:44

## 2024-02-01 RX ADMIN — OXYCODONE HYDROCHLORIDE 5 MILLIGRAM(S): 5 TABLET ORAL at 16:30

## 2024-02-01 RX ADMIN — Medication 325 MILLIGRAM(S): at 12:05

## 2024-02-01 RX ADMIN — Medication 25 GRAM(S): at 22:13

## 2024-02-01 RX ADMIN — Medication 975 MILLIGRAM(S): at 18:56

## 2024-02-01 RX ADMIN — Medication 975 MILLIGRAM(S): at 23:00

## 2024-02-01 RX ADMIN — OXYCODONE HYDROCHLORIDE 5 MILLIGRAM(S): 5 TABLET ORAL at 16:00

## 2024-02-01 RX ADMIN — Medication 975 MILLIGRAM(S): at 05:01

## 2024-02-01 RX ADMIN — Medication 15 MILLIGRAM(S): at 05:02

## 2024-02-01 RX ADMIN — Medication 15 MILLIGRAM(S): at 12:01

## 2024-02-01 RX ADMIN — ENOXAPARIN SODIUM 40 MILLIGRAM(S): 100 INJECTION SUBCUTANEOUS at 12:05

## 2024-02-01 RX ADMIN — MUPIROCIN 1 APPLICATION(S): 20 OINTMENT TOPICAL at 18:54

## 2024-02-01 RX ADMIN — Medication 500 MILLILITER(S): at 01:21

## 2024-02-01 RX ADMIN — Medication 15 MILLIGRAM(S): at 00:04

## 2024-02-01 RX ADMIN — Medication 25 GRAM(S): at 06:54

## 2024-02-01 RX ADMIN — Medication 100 MILLIGRAM(S): at 23:00

## 2024-02-01 RX ADMIN — Medication 975 MILLIGRAM(S): at 18:38

## 2024-02-01 RX ADMIN — Medication 500 MILLIGRAM(S): at 18:38

## 2024-02-01 RX ADMIN — Medication 15 MILLIGRAM(S): at 18:55

## 2024-02-01 RX ADMIN — Medication 15 MILLIGRAM(S): at 06:01

## 2024-02-01 RX ADMIN — SENNA PLUS 2 TABLET(S): 8.6 TABLET ORAL at 21:01

## 2024-02-01 RX ADMIN — Medication 15 MILLIGRAM(S): at 01:04

## 2024-02-01 RX ADMIN — Medication 975 MILLIGRAM(S): at 06:01

## 2024-02-01 RX ADMIN — Medication 250 MILLIGRAM(S): at 19:51

## 2024-02-01 RX ADMIN — PANTOPRAZOLE SODIUM 40 MILLIGRAM(S): 20 TABLET, DELAYED RELEASE ORAL at 12:05

## 2024-02-01 RX ADMIN — GABAPENTIN 100 MILLIGRAM(S): 400 CAPSULE ORAL at 13:47

## 2024-02-01 RX ADMIN — CHLORHEXIDINE GLUCONATE 1 APPLICATION(S): 213 SOLUTION TOPICAL at 11:52

## 2024-02-01 RX ADMIN — Medication 400 MILLIGRAM(S): at 13:10

## 2024-02-01 RX ADMIN — GABAPENTIN 100 MILLIGRAM(S): 400 CAPSULE ORAL at 21:01

## 2024-02-01 RX ADMIN — Medication 125 MILLILITER(S): at 06:53

## 2024-02-01 RX ADMIN — Medication 500 MILLIGRAM(S): at 05:02

## 2024-02-01 RX ADMIN — OXYCODONE HYDROCHLORIDE 10 MILLIGRAM(S): 5 TABLET ORAL at 21:02

## 2024-02-01 RX ADMIN — Medication 15 MILLIGRAM(S): at 18:38

## 2024-02-01 RX ADMIN — MUPIROCIN 1 APPLICATION(S): 20 OINTMENT TOPICAL at 05:02

## 2024-02-01 RX ADMIN — Medication 15 MILLIGRAM(S): at 23:01

## 2024-02-01 RX ADMIN — GABAPENTIN 100 MILLIGRAM(S): 400 CAPSULE ORAL at 05:01

## 2024-02-01 RX ADMIN — Medication 100 MILLIGRAM(S): at 07:58

## 2024-02-01 RX ADMIN — Medication 2: at 21:06

## 2024-02-01 RX ADMIN — OXYCODONE HYDROCHLORIDE 10 MILLIGRAM(S): 5 TABLET ORAL at 22:02

## 2024-02-01 RX ADMIN — Medication 15 MILLIGRAM(S): at 12:30

## 2024-02-01 RX ADMIN — Medication 250 MILLIGRAM(S): at 09:00

## 2024-02-01 RX ADMIN — Medication 1000 MILLIGRAM(S): at 14:00

## 2024-02-01 NOTE — PROGRESS NOTE ADULT - SUBJECTIVE AND OBJECTIVE BOX
GLORY MULLINS  MRN-[id number]    HPI:  69 y/o female with PMH of HTN, HLD, left breast cancer (s/p XRT and lumpectomy; 2009), pAF (on Eliquis) and Mitral valve stenosis s/p Mitral valve replacement #29 [facility] Giovanna Jackson with MAZE ablation (April 2013 Dr Zhang [facility]) presents to PST with complaints of worsening shortness of breath. States in 06/2023 she started to notice worsening shortness of breath and STEWART which has gotten progressively worse since. She has also had 2 episodes of atrial fibrillation with cardioversions since 06/2023 with most recent cardioversion 01/2024. She was recently admitted to [facility] with complaints of 3 weeks of worsening SOB and STEWART, acute on chronic diastolic CHF exacerbation, with SARINA showing severe prosthetic mitral stenosis and severe TR, s/p LHC with no obstructive CAD noted. Denies chest pain, palpitations, dizziness, lightheadedness or near syncope. Patient is scheduled for mitral valve repair, possible replace, tricuspid valve replacement with Dr. Jimenez on 1/31/23, to be pre- admitted on 1/30/24.  (18 Jan 2024 09:29)    Surgery/Hospital Course:  ·  PRE-OP DIAGNOSIS:  Mitral stenosis   Tricuspid regurgitation   ·  POST-OP DIAGNOSIS:  Mitral stenosis   Tricuspid regurgitation  ·  PROCEDURES:  Repeat sternotomy 31-Jan-2024   Replacement, mitral valve, with SARINA, adult, 29mm Pizarro Mitral Valve   Repair of tricuspid valve in adult , 28mm Triad Ring -  Today:  No acute events -    ICU Vital Signs Last 24 Hrs  T(C): 37.2 (01 Feb 2024 12:00), Max: 37.3 (31 Jan 2024 19:00)  T(F): 99 (01 Feb 2024 12:00), Max: 99.1 (31 Jan 2024 19:00)  HR: 77 (01 Feb 2024 14:00) (50 - 77)  BP: 100/56 (01 Feb 2024 14:00) (100/56 - 166/77)  BP(mean): 74 (01 Feb 2024 14:00) (74 - 110)  ABP: 123/57 (01 Feb 2024 14:00) (84/45 - 139/64)  ABP(mean): 78 (01 Feb 2024 14:00) (60 - 97)  RR: 18 (01 Feb 2024 14:00) (9 - 39)  SpO2: 95% (01 Feb 2024 14:00) (95% - 100%)    O2 Parameters below as of 01 Feb 2024 14:00  Patient On (Oxygen Delivery Method): room air            Physical Exam:  Gen: A&O   CNS: non focal 	  Neck: no JVD  RES : clear , no wheezing              CVS: Regular  rhythm. Normal S1/S2  Abd: Soft, non-distended. Bowel sounds present.  Skin: No rash.  Ext:  no edema    ============================I/O===========================   I&O's Detail    31 Jan 2024 07:01  -  01 Feb 2024 07:00  --------------------------------------------------------  IN:    Albumin 5%  - 250 mL: 500 mL    IV PiggyBack: 250 mL    IV PiggyBack: 100 mL    IV PiggyBack: 100 mL    IV PiggyBack: 50 mL    Lactated Ringers Bolus: 1000 mL    Norepinephrine: 20.8 mL    PRBCs (Packed Red Blood Cells): 315 mL    Propofol: 91 mL    sodium chloride 0.9%: 190 mL    sodium chloride 0.9%: 95 mL  Total IN: 2711.8 mL    OUT:    Chest Tube (mL): 335 mL    Indwelling Catheter - Urethral (mL): 1005 mL    Insulin: 0 mL  Total OUT: 1340 mL    Total NET: 1371.8 mL      01 Feb 2024 07:01  -  01 Feb 2024 14:52  --------------------------------------------------------  IN:    IV PiggyBack: 250 mL    IV PiggyBack: 50 mL    Oral Fluid: 240 mL    sodium chloride 0.9%: 25 mL    sodium chloride 0.9%: 50 mL  Total IN: 615 mL    OUT:    Chest Tube (mL): 50 mL    Indwelling Catheter - Urethral (mL): 165 mL    Insulin: 0 mL  Total OUT: 215 mL    Total NET: 400 mL        ============================ LABS =========================                        10.1   8.19  )-----------( 127      ( 01 Feb 2024 02:00 )             30.3     02-01    142  |  108  |  20.1<H>  ----------------------------<  132<H>  4.5   |  22.0  |  0.97    Ca    8.3<L>      01 Feb 2024 02:00  Phos  3.0     01-31  Mg     2.0     02-01    TPro  5.3<L>  /  Alb  3.6  /  TBili  0.8  /  DBili  x   /  AST  36<H>  /  ALT  14  /  AlkPhos  58  02-01    LIVER FUNCTIONS - ( 01 Feb 2024 02:00 )  Alb: 3.6 g/dL / Pro: 5.3 g/dL / ALK PHOS: 58 U/L / ALT: 14 U/L / AST: 36 U/L / GGT: x           PT/INR - ( 01 Feb 2024 02:00 )   PT: 12.2 sec;   INR: 1.10 ratio         PTT - ( 01 Feb 2024 02:00 )  PTT:25.7 sec  ABG - ( 31 Jan 2024 23:03 )  pH, Arterial: 7.400 pH, Blood: x     /  pCO2: 41    /  pO2: 109   / HCO3: 25    / Base Excess: 0.6   /  SaO2: 99.8              Urinalysis Basic - ( 01 Feb 2024 02:00 )    Color: x / Appearance: x / SG: x / pH: x  Gluc: 132 mg/dL / Ketone: x  / Bili: x / Urobili: x   Blood: x / Protein: x / Nitrite: x   Leuk Esterase: x / RBC: x / WBC x   Sq Epi: x / Non Sq Epi: x / Bacteria: x      ======================Micro/Rad/Cardio=================  Culture: Reviewed   CXR: Reviewed  Echo:Reviewed  ======================================================  PAST MEDICAL & SURGICAL HISTORY:  Afib      Breast cancer  left; s/p lumpectomy and XRT      Mitral valve insufficiency      Tricuspid valve insufficiency      HTN (hypertension)      HLD (hyperlipidemia)      S/P breast lumpectomy  left      H/O prosthetic mitral valve  bioprosthetic bovine 2013      History of cardiac cath      H/O right wrist surgery        ====================ASSESSMENT ==============  69 y/o female with PMH of HTN, HLD, left breast cancer (s/p XRT and lumpectomy; 2009), pAF (on Eliquis) and Mitral valve stenosis s/p Mitral valve replacement #29 [facility] Giovanna Jackson with MAZE ablation (April 2013 Dr Zhang [facility]) presents to PST with complaints of worsening shortness of breath. States in 06/2023 she started to notice worsening shortness of breath and STEWART which has gotten progressively worse since. She has also had 2 episodes of atrial fibrillation with cardioversions since 06/2023 with most recent cardioversion 01/2024. She was recently admitted to [facility] with complaints of 3 weeks of worsening SOB and STEWART, acute on chronic diastolic CHF exacerbation, with SARINA showing severe prosthetic mitral stenosis and severe TR, s/p LHC with no obstructive CAD noted. Denies chest pain, palpitations, dizziness, lightheadedness or near syncope. Patient is scheduled for mitral valve repair, possible replace, tricuspid valve replacement with Dr. Jimenez on 1/31/23, to be pre- admitted on 1/30/24.  (18 Jan 2024 09:29)    ---Afib  ---Breast cancer  ---HTN (hypertension)  ---HLD (hyperlipidemia)  ---H/O prosthetic mitral valve  ---History of cardiac cath  ---Mitral stenosis   ---Tricuspid regurgitation  ---S/p Repeat sternotomy 31-Jan-2024               Replacement, mitral valve, with SARINA, adult, 29mm Pizarro Mitral Valve               Repair of tricuspid valve in adult , 28mm Triad Ring -  ---Post op Hypovolemic shock  ---Post op respiratory insufficiency       Plan:-  -Wean pressor as tolerated  -Beta blocker and Amio ERAS on hold 2/2 to bradycardia   -Lipitor for chronic graft patency prophylaxis  -Aspirin for acute graft closure prophylaxis  -Chest PT and IS use with bedside nurse  -dash diet.  -Protonix for GI prophylaxis.  -Lovenox/SCDs for DVT prophylaxis.-  ====================== NEUROLOGY=====================  acetaminophen     Tablet .. 975 milliGRAM(s) Oral every 6 hours  gabapentin 100 milliGRAM(s) Oral every 8 hours  ketorolac   Injectable 15 milliGRAM(s) IV Push every 6 hours  ondansetron Injectable 4 milliGRAM(s) IV Push every 6 hours PRN Nausea and/or Vomiting  oxyCODONE    IR 5 milliGRAM(s) Oral every 4 hours PRN Moderate Pain (4 - 6)  oxyCODONE    IR 10 milliGRAM(s) Oral every 4 hours PRN Severe Pain (7 - 10)    ==================== RESPIRATORY======================  Post op respiratory insufficiency  ====================CARDIOVASCULAR==================  Post op Hypovolemia    ===================HEMATOLOGIC/ONC ===================  Monitor H&H/Plts    aspirin enteric coated 325 milliGRAM(s) Oral daily  enoxaparin Injectable 40 milliGRAM(s) SubCutaneous every 24 hours    ===================== RENAL =========================  Continue monitoring urine output, I&OS, BUN/Cr     ==================== GASTROINTESTINAL===================  ascorbic acid 500 milliGRAM(s) Oral two times a day  dextrose 5%. 1000 milliLiter(s) (50 mL/Hr) IV Continuous <Continuous>  dextrose 5%. 1000 milliLiter(s) (100 mL/Hr) IV Continuous <Continuous>  pantoprazole    Tablet 40 milliGRAM(s) Oral daily  polyethylene glycol 3350 17 Gram(s) Oral daily  senna 2 Tablet(s) Oral at bedtime  sodium chloride 0.9%. 1000 milliLiter(s) (10 mL/Hr) IV Continuous <Continuous>  sodium chloride 0.9%. 1000 milliLiter(s) (5 mL/Hr) IV Continuous <Continuous>    =======================    ENDOCRINE  =====================  dextrose 50% Injectable 50 milliLiter(s) IV Push every 15 minutes  dextrose 50% Injectable 25 milliLiter(s) IV Push every 15 minutes  dextrose Oral Gel 15 Gram(s) Oral once PRN Blood Glucose LESS THAN 70 milliGRAM(s)/deciliter  glucagon  Injectable 1 milliGRAM(s) IntraMuscular once  insulin lispro (ADMELOG) corrective regimen sliding scale   SubCutaneous Before meals and at bedtime    ========================INFECTIOUS DISEASE================  cefuroxime  IVPB 1500 milliGRAM(s) IV Intermittent every 8 hours  vancomycin  IVPB 1000 milliGRAM(s) IV Intermittent every 12 hours      -Monitor Neurologic status ,   -Head of the bed should remain elevated to 45 degrees,  -Monitor for arrhythmias and monitor parameters for organ perfusion,  -Glycemic control is satisfactory,  -Nutritional goals will be met using po eventually , insure adequate caloric intake and monitor the same ,  -Electrolytes have been repleted as necessary , pain control has been achieved  and wound care has been carried out ,  -Stress ulcer and VTE prophylaxis will be achieved,  -Agressive PT and early mobility and ambulation goals will be met,    I have spent 35 minutes providing acute care for this critically ill patient     Patient requires continuous monitoring with bedside rhythm monitoring, pulse ox monitoring, and intermittent blood gas analysis. Care plan discussed with ICU care team. Patient remained critical and at risk for life threatening decompensation.

## 2024-02-01 NOTE — PROGRESS NOTE ADULT - SUBJECTIVE AND OBJECTIVE BOX
Brief summary:  68yFemale POD# 1 s/p re-op sternotomy/ Redo MVR (T) and TV repair annulplasty     SUBJECTIVE:  Pt in bed recently extubated, pt c/o of incisional pain, pt denies SOB, palpitations, n/v/       Overnight events:  no acute overnight events     PAST MEDICAL & SURGICAL HISTORY:  Afib      Breast cancer  left; s/p lumpectomy and XRT      Mitral valve insufficiency      Tricuspid valve insufficiency      HTN (hypertension)      HLD (hyperlipidemia)      S/P breast lumpectomy  left      H/O prosthetic mitral valve  bioprosthetic bovine       History of cardiac cath      H/O right wrist surgery          MEDICATIONS   acetaminophen     Tablet .. 975 milliGRAM(s) Oral every 6 hours  ascorbic acid 500 milliGRAM(s) Oral two times a day  aspirin enteric coated 325 milliGRAM(s) Oral daily  cefuroxime  IVPB 1500 milliGRAM(s) IV Intermittent every 8 hours  chlorhexidine 2% Cloths 1 Application(s) Topical daily  dextrose 50% Injectable 25 milliLiter(s) IV Push every 15 minutes  dextrose 50% Injectable 50 milliLiter(s) IV Push every 15 minutes  enoxaparin Injectable 40 milliGRAM(s) SubCutaneous every 24 hours  gabapentin 100 milliGRAM(s) Oral every 8 hours  insulin lispro Injectable (ADMELOG) 2 Unit(s) SubCutaneous three times a day before meals  insulin regular Infusion 2 Unit(s)/Hr IV Continuous <Continuous>  ketorolac   Injectable 15 milliGRAM(s) IV Push every 6 hours  mupirocin 2% Ointment 1 Application(s) Both Nostrils two times a day  ondansetron Injectable 4 milliGRAM(s) IV Push every 6 hours PRN  oxyCODONE    IR 5 milliGRAM(s) Oral every 4 hours PRN  oxyCODONE    IR 10 milliGRAM(s) Oral every 4 hours PRN  pantoprazole    Tablet 40 milliGRAM(s) Oral daily  polyethylene glycol 3350 17 Gram(s) Oral daily  senna 2 Tablet(s) Oral at bedtime  sodium chloride 0.9%. 1000 milliLiter(s) IV Continuous <Continuous>  sodium chloride 0.9%. 1000 milliLiter(s) IV Continuous <Continuous>  vancomycin  IVPB 1000 milliGRAM(s) IV Intermittent every 12 hours  MEDICATIONS  (PRN):  ondansetron Injectable 4 milliGRAM(s) IV Push every 6 hours PRN Nausea and/or Vomiting  oxyCODONE    IR 5 milliGRAM(s) Oral every 4 hours PRN Moderate Pain (4 - 6)  oxyCODONE    IR 10 milliGRAM(s) Oral every 4 hours PRN Severe Pain (7 - 10)    Height (cm): 172.7 ( @ 06:12)  Weight (kg): 72.3 ( @ 06:12)  BMI (kg/m2): 24.2 ( @ 06:12)  BSA (m2): 1.86 ( @ 06:12)Mode: CPAP with PS, FiO2: 40, PEEP: 6, PS: 5, MAP: 8  Daily Height in cm: 172.72 (2024 06:09)    Daily Weight in k.3 (2024 05:05)      ABG - ( 2024 23:03 )  pH, Arterial: 7.400 pH, Blood: x     /  pCO2: 41    /  pO2: 109   / HCO3: 25    / Base Excess: 0.6   /  SaO2: 99.8                                    10.1   8.19  )-----------( 127      ( 2024 02:00 )             30.3   02-    142  |  108  |  20.1<H>  ----------------------------<  132<H>  4.5   |  22.0  |  0.97    Ca    8.3<L>      2024 02:00  Phos  3.0       Mg     2.0         TPro  5.3<L>  /  Alb  3.6  /  TBili  0.8  /  DBili  x   /  AST  36<H>  /  ALT  14  /  AlkPhos  58  02-01    CARDIAC MARKERS ( 2024 02:00 )  x     / x     / 187 U/L / x     / 14.1 ng/mL  CARDIAC MARKERS ( 2024 13:15 )  x     / x     / 156 U/L / x     / 18.4 ng/mL    PT/INR - ( 2024 02:00 )   PT: 12.2 sec;   INR: 1.10 ratio         PTT - ( 2024 02:00 )  PTT:25.7 sec      Objective:  T(C): 37.3 (24 @ 19:00), Max: 37.3 (24 @ 19:00)  HR: 54 (24 @ 03:00) (50 - 67)  BP: 141/64 (24 @ 03:00) (117/76 - 159/77)  RR: 17 (24 @ 03:00) (9 - 53)  SpO2: 100% (24 @ 03:00) (95% - 100%)  Wt(kg): --CAPILLARY BLOOD GLUCOSE      POCT Blood Glucose.: 128 mg/dL (2024 03:14)  POCT Blood Glucose.: 132 mg/dL (2024 01:07)  POCT Blood Glucose.: 138 mg/dL (2024 22:58)  POCT Blood Glucose.: 136 mg/dL (2024 20:56)  POCT Blood Glucose.: 137 mg/dL (2024 19:11)  POCT Blood Glucose.: 136 mg/dL (2024 17:04)  POCT Blood Glucose.: 152 mg/dL (2024 16:04)  POCT Blood Glucose.: 132 mg/dL (2024 15:03)  POCT Blood Glucose.: 128 mg/dL (2024 14:02)  I&O's Summary    2024 07:01  -  2024 04:09  --------------------------------------------------------  IN: 2351.8 mL / OUT: 1240 mL / NET: 1111.8 mL        Physical Exam  Neuro: A+O x 3, non-focal, speech clear and intact  HEENT:  NCAT, PERRL, EOMI. No conjuctival edema or icterus, no thrush.  Neck: supple, trachea midline  Pulm: b/l Diminshed, no rales/rhonchi/wheezing, no accessory muscle use noted  CV: Bradycardic, +S1S2,  Abd: soft, NT, ND, hypoactive  Ext: CHAPMAN x 4, trace b/l edema, no cyanosis or clubbing, b/l Groins sheath sites C/D/I/soft/no hematoma. 2+ DP b/l, distal motor/neuro/circ intact.   Inc: MSI C/D/I/stable w/ dressing  Chest tubes: Meds X 2   LINES: Right IJ, Right RAL   : + hamm   +PW

## 2024-02-01 NOTE — PHYSICAL THERAPY INITIAL EVALUATION ADULT - WORK/LEISURE ACTIVITY, REHAB EVAL
Impression: Presence of intraocular lens: Z96.1.  Plan: Referral for [facility] right eye first.
independent

## 2024-02-02 LAB
ANION GAP SERPL CALC-SCNC: 10 MMOL/L — SIGNIFICANT CHANGE UP (ref 5–17)
BUN SERPL-MCNC: 27.2 MG/DL — HIGH (ref 8–20)
CALCIUM SERPL-MCNC: 8.3 MG/DL — LOW (ref 8.4–10.5)
CHLORIDE SERPL-SCNC: 101 MMOL/L — SIGNIFICANT CHANGE UP (ref 96–108)
CO2 SERPL-SCNC: 23 MMOL/L — SIGNIFICANT CHANGE UP (ref 22–29)
CREAT SERPL-MCNC: 1.18 MG/DL — SIGNIFICANT CHANGE UP (ref 0.5–1.3)
EGFR: 51 ML/MIN/1.73M2 — LOW
GLUCOSE BLDC GLUCOMTR-MCNC: 100 MG/DL — HIGH (ref 70–99)
GLUCOSE BLDC GLUCOMTR-MCNC: 101 MG/DL — HIGH (ref 70–99)
GLUCOSE BLDC GLUCOMTR-MCNC: 112 MG/DL — HIGH (ref 70–99)
GLUCOSE BLDC GLUCOMTR-MCNC: 82 MG/DL — SIGNIFICANT CHANGE UP (ref 70–99)
GLUCOSE SERPL-MCNC: 104 MG/DL — HIGH (ref 70–99)
HCT VFR BLD CALC: 26.7 % — LOW (ref 34.5–45)
HGB BLD-MCNC: 9 G/DL — LOW (ref 11.5–15.5)
MAGNESIUM SERPL-MCNC: 2.8 MG/DL — HIGH (ref 1.8–2.6)
MCHC RBC-ENTMCNC: 31.1 PG — SIGNIFICANT CHANGE UP (ref 27–34)
MCHC RBC-ENTMCNC: 33.7 GM/DL — SIGNIFICANT CHANGE UP (ref 32–36)
MCV RBC AUTO: 92.4 FL — SIGNIFICANT CHANGE UP (ref 80–100)
PLATELET # BLD AUTO: 100 K/UL — LOW (ref 150–400)
POTASSIUM SERPL-MCNC: 4.3 MMOL/L — SIGNIFICANT CHANGE UP (ref 3.5–5.3)
POTASSIUM SERPL-SCNC: 4.3 MMOL/L — SIGNIFICANT CHANGE UP (ref 3.5–5.3)
RBC # BLD: 2.89 M/UL — LOW (ref 3.8–5.2)
RBC # FLD: 13.4 % — SIGNIFICANT CHANGE UP (ref 10.3–14.5)
SODIUM SERPL-SCNC: 134 MMOL/L — LOW (ref 135–145)
WBC # BLD: 7.27 K/UL — SIGNIFICANT CHANGE UP (ref 3.8–10.5)
WBC # FLD AUTO: 7.27 K/UL — SIGNIFICANT CHANGE UP (ref 3.8–10.5)

## 2024-02-02 PROCEDURE — 71045 X-RAY EXAM CHEST 1 VIEW: CPT | Mod: 26

## 2024-02-02 PROCEDURE — 93010 ELECTROCARDIOGRAM REPORT: CPT

## 2024-02-02 PROCEDURE — 99291 CRITICAL CARE FIRST HOUR: CPT

## 2024-02-02 PROCEDURE — 99024 POSTOP FOLLOW-UP VISIT: CPT

## 2024-02-02 RX ORDER — IRON SUCROSE 20 MG/ML
200 INJECTION, SOLUTION INTRAVENOUS ONCE
Refills: 0 | Status: COMPLETED | OUTPATIENT
Start: 2024-02-02 | End: 2024-02-02

## 2024-02-02 RX ORDER — FUROSEMIDE 40 MG
40 TABLET ORAL DAILY
Refills: 0 | Status: DISCONTINUED | OUTPATIENT
Start: 2024-02-02 | End: 2024-02-08

## 2024-02-02 RX ORDER — LANOLIN ALCOHOL/MO/W.PET/CERES
5 CREAM (GRAM) TOPICAL AT BEDTIME
Refills: 0 | Status: DISCONTINUED | OUTPATIENT
Start: 2024-02-02 | End: 2024-02-08

## 2024-02-02 RX ORDER — HYDROMORPHONE HYDROCHLORIDE 2 MG/ML
0.5 INJECTION INTRAMUSCULAR; INTRAVENOUS; SUBCUTANEOUS ONCE
Refills: 0 | Status: DISCONTINUED | OUTPATIENT
Start: 2024-02-02 | End: 2024-02-02

## 2024-02-02 RX ADMIN — Medication 15 MILLIGRAM(S): at 00:01

## 2024-02-02 RX ADMIN — Medication 250 MILLIGRAM(S): at 07:55

## 2024-02-02 RX ADMIN — HYDROMORPHONE HYDROCHLORIDE 0.5 MILLIGRAM(S): 2 INJECTION INTRAMUSCULAR; INTRAVENOUS; SUBCUTANEOUS at 23:10

## 2024-02-02 RX ADMIN — GABAPENTIN 100 MILLIGRAM(S): 400 CAPSULE ORAL at 05:46

## 2024-02-02 RX ADMIN — Medication 975 MILLIGRAM(S): at 06:46

## 2024-02-02 RX ADMIN — OXYCODONE HYDROCHLORIDE 10 MILLIGRAM(S): 5 TABLET ORAL at 21:07

## 2024-02-02 RX ADMIN — MUPIROCIN 1 APPLICATION(S): 20 OINTMENT TOPICAL at 19:26

## 2024-02-02 RX ADMIN — Medication 500 MILLIGRAM(S): at 17:34

## 2024-02-02 RX ADMIN — OXYCODONE HYDROCHLORIDE 10 MILLIGRAM(S): 5 TABLET ORAL at 21:37

## 2024-02-02 RX ADMIN — Medication 975 MILLIGRAM(S): at 00:00

## 2024-02-02 RX ADMIN — Medication 5 MILLIGRAM(S): at 23:25

## 2024-02-02 RX ADMIN — Medication 975 MILLIGRAM(S): at 23:33

## 2024-02-02 RX ADMIN — PANTOPRAZOLE SODIUM 40 MILLIGRAM(S): 20 TABLET, DELAYED RELEASE ORAL at 12:14

## 2024-02-02 RX ADMIN — OXYCODONE HYDROCHLORIDE 10 MILLIGRAM(S): 5 TABLET ORAL at 17:51

## 2024-02-02 RX ADMIN — Medication 975 MILLIGRAM(S): at 12:13

## 2024-02-02 RX ADMIN — GABAPENTIN 100 MILLIGRAM(S): 400 CAPSULE ORAL at 13:17

## 2024-02-02 RX ADMIN — OXYCODONE HYDROCHLORIDE 5 MILLIGRAM(S): 5 TABLET ORAL at 05:47

## 2024-02-02 RX ADMIN — Medication 500 MILLIGRAM(S): at 05:46

## 2024-02-02 RX ADMIN — OXYCODONE HYDROCHLORIDE 5 MILLIGRAM(S): 5 TABLET ORAL at 06:47

## 2024-02-02 RX ADMIN — GABAPENTIN 100 MILLIGRAM(S): 400 CAPSULE ORAL at 21:07

## 2024-02-02 RX ADMIN — Medication 975 MILLIGRAM(S): at 13:00

## 2024-02-02 RX ADMIN — HYDROMORPHONE HYDROCHLORIDE 0.5 MILLIGRAM(S): 2 INJECTION INTRAMUSCULAR; INTRAVENOUS; SUBCUTANEOUS at 23:21

## 2024-02-02 RX ADMIN — Medication 325 MILLIGRAM(S): at 12:14

## 2024-02-02 RX ADMIN — ENOXAPARIN SODIUM 40 MILLIGRAM(S): 100 INJECTION SUBCUTANEOUS at 12:14

## 2024-02-02 RX ADMIN — Medication 40 MILLIGRAM(S): at 10:03

## 2024-02-02 RX ADMIN — OXYCODONE HYDROCHLORIDE 10 MILLIGRAM(S): 5 TABLET ORAL at 17:35

## 2024-02-02 RX ADMIN — OXYCODONE HYDROCHLORIDE 5 MILLIGRAM(S): 5 TABLET ORAL at 12:27

## 2024-02-02 RX ADMIN — MUPIROCIN 1 APPLICATION(S): 20 OINTMENT TOPICAL at 05:48

## 2024-02-02 RX ADMIN — CHLORHEXIDINE GLUCONATE 1 APPLICATION(S): 213 SOLUTION TOPICAL at 12:18

## 2024-02-02 RX ADMIN — POLYETHYLENE GLYCOL 3350 17 GRAM(S): 17 POWDER, FOR SOLUTION ORAL at 12:13

## 2024-02-02 RX ADMIN — SENNA PLUS 2 TABLET(S): 8.6 TABLET ORAL at 23:11

## 2024-02-02 RX ADMIN — IRON SUCROSE 110 MILLIGRAM(S): 20 INJECTION, SOLUTION INTRAVENOUS at 09:11

## 2024-02-02 RX ADMIN — Medication 975 MILLIGRAM(S): at 05:46

## 2024-02-02 RX ADMIN — Medication 975 MILLIGRAM(S): at 17:49

## 2024-02-02 RX ADMIN — Medication 975 MILLIGRAM(S): at 17:35

## 2024-02-02 RX ADMIN — OXYCODONE HYDROCHLORIDE 5 MILLIGRAM(S): 5 TABLET ORAL at 13:00

## 2024-02-02 NOTE — DIETITIAN INITIAL EVALUATION ADULT - PERTINENT MEDS FT
MEDICATIONS  (STANDING):  acetaminophen     Tablet .. 975 milliGRAM(s) Oral every 6 hours  ascorbic acid 500 milliGRAM(s) Oral two times a day  aspirin enteric coated 325 milliGRAM(s) Oral daily  bisacodyl Suppository 10 milliGRAM(s) Rectal once  chlorhexidine 2% Cloths 1 Application(s) Topical daily  dextrose 5%. 1000 milliLiter(s) (50 mL/Hr) IV Continuous <Continuous>  dextrose 5%. 1000 milliLiter(s) (100 mL/Hr) IV Continuous <Continuous>  dextrose 50% Injectable 50 milliLiter(s) IV Push every 15 minutes  dextrose 50% Injectable 25 milliLiter(s) IV Push every 15 minutes  enoxaparin Injectable 40 milliGRAM(s) SubCutaneous every 24 hours  furosemide    Tablet 40 milliGRAM(s) Oral daily  gabapentin 100 milliGRAM(s) Oral every 8 hours  glucagon  Injectable 1 milliGRAM(s) IntraMuscular once  insulin lispro (ADMELOG) corrective regimen sliding scale   SubCutaneous Before meals and at bedtime  mupirocin 2% Ointment 1 Application(s) Both Nostrils two times a day  pantoprazole    Tablet 40 milliGRAM(s) Oral daily  polyethylene glycol 3350 17 Gram(s) Oral daily  senna 2 Tablet(s) Oral at bedtime  sodium chloride 0.9%. 1000 milliLiter(s) (10 mL/Hr) IV Continuous <Continuous>  sodium chloride 0.9%. 1000 milliLiter(s) (5 mL/Hr) IV Continuous <Continuous>    MEDICATIONS  (PRN):  dextrose Oral Gel 15 Gram(s) Oral once PRN Blood Glucose LESS THAN 70 milliGRAM(s)/deciliter  ondansetron Injectable 4 milliGRAM(s) IV Push every 6 hours PRN Nausea and/or Vomiting  oxyCODONE    IR 5 milliGRAM(s) Oral every 4 hours PRN Moderate Pain (4 - 6)  oxyCODONE    IR 10 milliGRAM(s) Oral every 4 hours PRN Severe Pain (7 - 10)

## 2024-02-02 NOTE — DIETITIAN INITIAL EVALUATION ADULT - NS FNS DIET ORDER
Diet, Regular:   Consistent Carbohydrate {No Snacks} (CSTCHO)  DASH/TLC {Sodium & Cholesterol Restricted} (DASH) (02-01-24 @ 02:52)  Diet, Clear Liquid:   Consistent Carbohydrate {No Snacks} (CSTCHO)  DASH/TLC {Sodium & Cholesterol Restricted} (DASH) (01-31-24 @ 12:04)

## 2024-02-02 NOTE — PROGRESS NOTE ADULT - SUBJECTIVE AND OBJECTIVE BOX
Subjective - patient seen and evaluated bedside. Sitting comfortably in bed. Denies CP, SOB, HA, dizziness, n/v/d    Review of Systems: negative x 10 systems except as noted above    Brief summary:  68yFemale POD# 2 reop MVR, TV repair    Significant/[id number] events: extubated      PAST MEDICAL & SURGICAL HISTORY:  Afib      Breast cancer  left; s/p lumpectomy and XRT      Mitral valve insufficiency      Tricuspid valve insufficiency      HTN (hypertension)      HLD (hyperlipidemia)      S/P breast lumpectomy  left      H/O prosthetic mitral valve  bioprosthetic bovine       History of cardiac cath      H/O right wrist surgery            acetaminophen     Tablet .. 975 milliGRAM(s) Oral every 6 hours  ascorbic acid 500 milliGRAM(s) Oral two times a day  aspirin enteric coated 325 milliGRAM(s) Oral daily  bisacodyl Suppository 10 milliGRAM(s) Rectal once  chlorhexidine 2% Cloths 1 Application(s) Topical daily  dextrose 5%. 1000 milliLiter(s) IV Continuous <Continuous>  dextrose 5%. 1000 milliLiter(s) IV Continuous <Continuous>  dextrose 50% Injectable 50 milliLiter(s) IV Push every 15 minutes  dextrose 50% Injectable 25 milliLiter(s) IV Push every 15 minutes  dextrose Oral Gel 15 Gram(s) Oral once PRN  enoxaparin Injectable 40 milliGRAM(s) SubCutaneous every 24 hours  gabapentin 100 milliGRAM(s) Oral every 8 hours  glucagon  Injectable 1 milliGRAM(s) IntraMuscular once  insulin lispro (ADMELOG) corrective regimen sliding scale   SubCutaneous Before meals and at bedtime  mupirocin 2% Ointment 1 Application(s) Both Nostrils two times a day  ondansetron Injectable 4 milliGRAM(s) IV Push every 6 hours PRN  oxyCODONE    IR 10 milliGRAM(s) Oral every 4 hours PRN  oxyCODONE    IR 5 milliGRAM(s) Oral every 4 hours PRN  pantoprazole    Tablet 40 milliGRAM(s) Oral daily  polyethylene glycol 3350 17 Gram(s) Oral daily  senna 2 Tablet(s) Oral at bedtime  sodium chloride 0.9%. 1000 milliLiter(s) IV Continuous <Continuous>  sodium chloride 0.9%. 1000 milliLiter(s) IV Continuous <Continuous>  vancomycin  IVPB 1000 milliGRAM(s) IV Intermittent every 12 hours  MEDICATIONS  (PRN):  dextrose Oral Gel 15 Gram(s) Oral once PRN Blood Glucose LESS THAN 70 milliGRAM(s)/deciliter  ondansetron Injectable 4 milliGRAM(s) IV Push every 6 hours PRN Nausea and/or Vomiting  oxyCODONE    IR 5 milliGRAM(s) Oral every 4 hours PRN Moderate Pain (4 - 6)  oxyCODONE    IR 10 milliGRAM(s) Oral every 4 hours PRN Severe Pain (7 - 10)      Daily     Daily Weight in k.9 (2024 05:19)      ABG - ( 2024 23:03 )  pH, Arterial: 7.400 pH, Blood: x     /  pCO2: 41    /  pO2: 109   / HCO3: 25    / Base Excess: 0.6   /  SaO2: 99.8                                    10.1   8.19  )-----------( 127      ( 2024 02:00 )             30.3   02    142  |  108  |  20.1<H>  ----------------------------<  132<H>  4.5   |  22.0  |  0.97    Ca    8.3<L>      2024 02:00  Phos  3.0       Mg     2.0         TPro  5.3<L>  /  Alb  3.6  /  TBili  0.8  /  DBili  x   /  AST  36<H>  /  ALT  14  /  AlkPhos  58  02-    CARDIAC MARKERS ( 2024 02:00 )  x     / x     / 187 U/L / x     / 14.1 ng/mL  CARDIAC MARKERS ( 2024 13:15 )  x     / x     / 156 U/L / x     / 18.4 ng/mL    PT/INR - ( 2024 02:00 )   PT: 12.2 sec;   INR: 1.10 ratio         PTT - ( 2024 02:00 )  PTT:25.7 sec      Objective:  T(C): 37 (24 @ 00:00), Max: 37.2 (24 @ 12:00)  HR: 54 (24 @ 01:00) (54 - 90)  BP: 116/54 (24 @ 01:00) (100/56 - 167/72)  RR: 15 (24 @ 01:00) (10 - 27)  SpO2: 96% (24 @ 01:00) (92% - 100%)  Wt(kg): --CAPILLARY BLOOD GLUCOSE      POCT Blood Glucose.: 157 mg/dL (2024 21:05)  POCT Blood Glucose.: 118 mg/dL (2024 16:42)  POCT Blood Glucose.: 124 mg/dL (2024 12:11)  POCT Blood Glucose.: 118 mg/dL (2024 10:10)  POCT Blood Glucose.: 124 mg/dL (2024 07:52)  POCT Blood Glucose.: 121 mg/dL (2024 06:55)  POCT Blood Glucose.: 124 mg/dL (2024 04:57)  POCT Blood Glucose.: 128 mg/dL (2024 03:14)  I&O's Summary    2024 07:01  -  2024 07:00  --------------------------------------------------------  IN: 2711.8 mL / OUT: 1340 mL / NET: 1371.8 mL    2024 07:01  -  2024 01:23  --------------------------------------------------------  IN: 1930 mL / OUT: 1055 mL / NET: 875 mL        Physical Exam  General: NAD  Neuro: A+O x 3, non-focal, speech clear and intact  Psych: Appropriate affect  HEENT:  NCAT, No conjuctival edema or icterus, no thrush.  Pulm: CTA, equal bilaterally  CV: irregularly irregular  +S1S2  Abd: soft, NT, ND, +BS  Ext: +DP Pulses b/l, no edema  Skin: Warm, dry, intact  Inc: MSI C/D/I/stable w/ dressing,   Chest tubes: mediastrinal chest tubes to suction, draining appropraitely ,no airleak        Imaging:    < from: Xray Chest 1 View- PORTABLE-Urgent (Xray Chest 1 View- PORTABLE-Urgent .) (24 @ 14:06) >    INTERPRETATION:  AP chest on 2024 at 1:14 PM. Patient is   preop.    Heart magnified by technique. Sternotomy and prosthetic heart valve again   noted.    Lungs remain clear.    Chest is similar to  this year.    IMPRESSION: No acute finding or change.    < end of copied text >  < from: SARINA W or WO Ultrasound Enhancing Agent (24 @ 15:36) >      1. Left ventricular systolic function is normal with an ejection fraction visually estimated at 50 to 55 %.   2. Mildly enlarged right ventricular cavity size and reduced systolic function.   3. No evidence of a patent foramen ovale by color flow Doppler.   4. Agitated saline injection was negative for intracardiac shunt.   5. Bioprosthetic valve present. in the mitral position. Degeneration of the mitral valve prosthesis. The prosthetic mitral valve appears to have pannus formation and has thickened leaflets. Transvalvular mitral gradients are elevated. Moderate-to-severe prosthetic mitral stenosis. There is centrally directed mild intravalvular mitral regurgitation. No paravalvular mitral regurgitation.   6. Moderate to severe tricuspid regurgitation. The tricuspid regurgitation is directed centrally.   7. No pericardial effusion seen.   8. No cardiac source of embolism was identified.   9. Moderate left pleural effusion noted.  10. Findings were discussed with patient on 2024.  11. Patient underwent successful restoration of sinus rhythm following the SARINA    < end of copied text >

## 2024-02-02 NOTE — DIETITIAN INITIAL EVALUATION ADULT - NSFNSGIIOFT_GEN_A_CORE
02-01-24 @ 07:01  -  02-02-24 @ 07:00  --------------------------------------------------------  OUT:    Chest Tube (mL): 225 mL  Total OUT: 225 mL    Total NET: -225 mL

## 2024-02-02 NOTE — PROGRESS NOTE ADULT - SUBJECTIVE AND OBJECTIVE BOX
GLORY MULLINS  MRN-[id number]    HPI:  69 y/o female with PMH of HTN, HLD, left breast cancer (s/p XRT and lumpectomy; 2009), pAF (on Eliquis) and Mitral valve stenosis s/p Mitral valve replacement #29 Perimount Giovanna Jackson with MAZE ablation (April 2013 Dr Zhang [facility]) presents to PST with complaints of worsening shortness of breath. States in 06/2023 she started to notice worsening shortness of breath and STEWART which has gotten progressively worse since. She has also had 2 episodes of atrial fibrillation with cardioversions since 06/2023 with most recent cardioversion 01/2024. She was recently admitted to [facility] with complaints of 3 weeks of worsening SOB and STEWART, acute on chronic diastolic CHF exacerbation, with SARINA showing severe prosthetic mitral stenosis and severe TR, s/p LHC with no obstructive CAD noted. Denies chest pain, palpitations, dizziness, lightheadedness or near syncope. Patient is scheduled for mitral valve repair, possible replace, tricuspid valve replacement with Dr. Jimenez on 1/31/23, to be pre- admitted on 1/30/24.  (18 Jan 2024 09:29)    Surgery/Hospital Course:  ·  PRE-OP DIAGNOSIS:  Mitral stenosis   Tricuspid regurgitation   ·  POST-OP DIAGNOSIS:  Mitral stenosis   Tricuspid regurgitation  ·  PROCEDURES:  Repeat sternotomy 31-Jan-2024   Replacement, mitral valve, with SARINA, adult, 29mm Pizarro Mitral Valve   Repair of tricuspid valve in adult , 28mm Triad Ring -  Today:  No acute events-    ICU Vital Signs Last 24 Hrs  T(C): 36.5 (02 Feb 2024 08:00), Max: 37 (02 Feb 2024 00:00)  T(F): 97.7 (02 Feb 2024 08:00), Max: 98.6 (02 Feb 2024 00:00)  HR: 73 (02 Feb 2024 13:00) (52 - 90)  BP: 161/69 (02 Feb 2024 13:00) (107/53 - 171/71)  BP(mean): 99 (02 Feb 2024 13:00) (72 - 104)  ABP: 84/54 (02 Feb 2024 00:00) (61/56 - 125/55)  ABP(mean): 67 (02 Feb 2024 00:00) (59 - 92)  RR: 31 (02 Feb 2024 13:00) (14 - 31)  SpO2: 93% (02 Feb 2024 13:00) (93% - 100%)    O2 Parameters below as of 02 Feb 2024 09:00  Patient On (Oxygen Delivery Method): room air            Physical Exam:  Gen: A&O   CNS: non focal 	  Neck: no JVD  RES : clear , no wheezing              CVS: Regular  rhythm. Normal S1/S2  Abd: Soft, non-distended. Bowel sounds present.  Skin: No rash.  Ext:  no edema    ============================I/O===========================   I&O's Detail    01 Feb 2024 07:01  -  02 Feb 2024 07:00  --------------------------------------------------------  IN:    IV PiggyBack: 100 mL    IV PiggyBack: 500 mL    IV PiggyBack: 50 mL    IV PiggyBack: 150 mL    Oral Fluid: 860 mL    sodium chloride 0.9%: 120 mL    sodium chloride 0.9%: 240 mL  Total IN: 2020 mL    OUT:    Chest Tube (mL): 225 mL    Indwelling Catheter - Urethral (mL): 1070 mL    Insulin: 0 mL  Total OUT: 1295 mL    Total NET: 725 mL      02 Feb 2024 07:01  -  02 Feb 2024 15:40  --------------------------------------------------------  IN:    IV PiggyBack: 50 mL    IV PiggyBack: 250 mL    Oral Fluid: 500 mL    sodium chloride 0.9%: 60 mL    sodium chloride 0.9%: 30 mL  Total IN: 890 mL    OUT:    Indwelling Catheter - Urethral (mL): 780 mL  Total OUT: 780 mL    Total NET: 110 mL        ============================ LABS =========================                        9.0    7.27  )-----------( 100      ( 02 Feb 2024 02:15 )             26.7     02-02    134<L>  |  101  |  27.2<H>  ----------------------------<  104<H>  4.3   |  23.0  |  1.18    Ca    8.3<L>      02 Feb 2024 02:15  Mg     2.8     02-02    TPro  5.3<L>  /  Alb  3.6  /  TBili  0.8  /  DBili  x   /  AST  36<H>  /  ALT  14  /  AlkPhos  58  02-01    LIVER FUNCTIONS - ( 01 Feb 2024 02:00 )  Alb: 3.6 g/dL / Pro: 5.3 g/dL / ALK PHOS: 58 U/L / ALT: 14 U/L / AST: 36 U/L / GGT: x           PT/INR - ( 01 Feb 2024 02:00 )   PT: 12.2 sec;   INR: 1.10 ratio         PTT - ( 01 Feb 2024 02:00 )  PTT:25.7 sec  ABG - ( 31 Jan 2024 23:03 )  pH, Arterial: 7.400 pH, Blood: x     /  pCO2: 41    /  pO2: 109   / HCO3: 25    / Base Excess: 0.6   /  SaO2: 99.8              Urinalysis Basic - ( 02 Feb 2024 02:15 )    Color: x / Appearance: x / SG: x / pH: x  Gluc: 104 mg/dL / Ketone: x  / Bili: x / Urobili: x   Blood: x / Protein: x / Nitrite: x   Leuk Esterase: x / RBC: x / WBC x   Sq Epi: x / Non Sq Epi: x / Bacteria: x      ======================Micro/Rad/Cardio=================  Culture: Reviewed   CXR: Reviewed  Echo:Reviewed  ======================================================  PAST MEDICAL & SURGICAL HISTORY:  Afib      Breast cancer  left; s/p lumpectomy and XRT      Mitral valve insufficiency      Tricuspid valve insufficiency      HTN (hypertension)      HLD (hyperlipidemia)      S/P breast lumpectomy  left      H/O prosthetic mitral valve  bioprosthetic bovine 2013      History of cardiac cath      H/O right wrist surgery        ====================ASSESSMENT ==============  69 y/o female with PMH of HTN, HLD, left breast cancer (s/p XRT and lumpectomy; 2009), pAF (on Eliquis) and Mitral valve stenosis s/p Mitral valve replacement #29 Perimount Giovanna Jackson with MAZE ablation (April 2013 Dr Zhang [facility]) presents to PST with complaints of worsening shortness of breath. States in 06/2023 she started to notice worsening shortness of breath and STEWART which has gotten progressively worse since. She has also had 2 episodes of atrial fibrillation with cardioversions since 06/2023 with most recent cardioversion 01/2024. She was recently admitted to [facility] with complaints of 3 weeks of worsening SOB and STEWART, acute on chronic diastolic CHF exacerbation, with SARINA showing severe prosthetic mitral stenosis and severe TR, s/p LHC with no obstructive CAD noted. Denies chest pain, palpitations, dizziness, lightheadedness or near syncope. Patient is scheduled for mitral valve repair, possible replace, tricuspid valve replacement with Dr. Jimenez on 1/31/23, to be pre- admitted on 1/30/24.  (18 Jan 2024 09:29)    ---Afib  ---Breast cancer  ---HTN (hypertension)  ---HLD (hyperlipidemia)  ---H/O prosthetic mitral valve  ---History of cardiac cath  ---Mitral stenosis   ---Tricuspid regurgitation  ---S/p Repeat sternotomy 31-Jan-2024               Replacement, mitral valve, with SARINA, adult, 29mm Pizarro Mitral Valve               Repair of tricuspid valve in adult , 28mm Triad Ring -  ---Post op Hypovolemic shock  ---Post op respiratory insufficiency       Plan:-  -Beta blocker and Amio ERAS on hold 2/2 to bradycardia   -Lipitor for chronic graft patency prophylaxis  -Aspirin for acute graft closure prophylaxis  -Chest PT and IS use with bedside nurse  -dash diet.  -Protonix for GI prophylaxis.  -Lovenox/SCDs for DVT prophylaxis.--  -DC A line  -DC Central line  -Lasix 40 mg  -Eliquis BID on discharge    ====================== NEUROLOGY=====================  acetaminophen     Tablet .. 975 milliGRAM(s) Oral every 6 hours  gabapentin 100 milliGRAM(s) Oral every 8 hours  ondansetron Injectable 4 milliGRAM(s) IV Push every 6 hours PRN Nausea and/or Vomiting  oxyCODONE    IR 10 milliGRAM(s) Oral every 4 hours PRN Severe Pain (7 - 10)  oxyCODONE    IR 5 milliGRAM(s) Oral every 4 hours PRN Moderate Pain (4 - 6)    ==================== RESPIRATORY======================  Post op respiratory insufficiency  ====================CARDIOVASCULAR==================  Post op Hypovolemia  furosemide    Tablet 40 milliGRAM(s) Oral daily    ===================HEMATOLOGIC/ONC ===================  Monitor H&H/Plts    aspirin enteric coated 325 milliGRAM(s) Oral daily  enoxaparin Injectable 40 milliGRAM(s) SubCutaneous every 24 hours    ===================== RENAL =========================  Continue monitoring urine output, I&OS, BUN/Cr     ==================== GASTROINTESTINAL===================  ascorbic acid 500 milliGRAM(s) Oral two times a day  bisacodyl Suppository 10 milliGRAM(s) Rectal once  dextrose 5%. 1000 milliLiter(s) (50 mL/Hr) IV Continuous <Continuous>  dextrose 5%. 1000 milliLiter(s) (100 mL/Hr) IV Continuous <Continuous>  pantoprazole    Tablet 40 milliGRAM(s) Oral daily  polyethylene glycol 3350 17 Gram(s) Oral daily  senna 2 Tablet(s) Oral at bedtime  sodium chloride 0.9%. 1000 milliLiter(s) (10 mL/Hr) IV Continuous <Continuous>  sodium chloride 0.9%. 1000 milliLiter(s) (5 mL/Hr) IV Continuous <Continuous>    =======================    ENDOCRINE  =====================  dextrose 50% Injectable 50 milliLiter(s) IV Push every 15 minutes  dextrose 50% Injectable 25 milliLiter(s) IV Push every 15 minutes  dextrose Oral Gel 15 Gram(s) Oral once PRN Blood Glucose LESS THAN 70 milliGRAM(s)/deciliter  glucagon  Injectable 1 milliGRAM(s) IntraMuscular once  insulin lispro (ADMELOG) corrective regimen sliding scale   SubCutaneous Before meals and at bedtime    ========================INFECTIOUS DISEASE================      -Monitor Neurologic status ,   -Head of the bed should remain elevated to 45 degrees,  -Monitor for arrhythmias and monitor parameters for organ perfusion,  -Glycemic control is satisfactory,  -Nutritional goals will be met using po eventually , insure adequate caloric intake and monitor the same ,  -Electrolytes have been repleted as necessary , pain control has been achieved  and wound care has been carried out ,  -Stress ulcer and VTE prophylaxis will be achieved,  -Agressive PT and early mobility and ambulation goals will be met,  -The family was updated about the course and plan .      I have spent 35 minutes providing acute care for this critically ill patient     Patient requires continuous monitoring with bedside rhythm monitoring, pulse ox monitoring, and intermittent blood gas analysis. Care plan discussed with ICU care team. Patient remained critical and at risk for life threatening decompensation.

## 2024-02-02 NOTE — DIETITIAN INITIAL EVALUATION ADULT - NSICDXPASTSURGICALHX_GEN_ALL_CORE_FT
PAST SURGICAL HISTORY:  H/O prosthetic mitral valve bioprosthetic bovine 2013    H/O right wrist surgery     History of cardiac cath     S/P breast lumpectomy left

## 2024-02-02 NOTE — DIETITIAN INITIAL EVALUATION ADULT - ORAL INTAKE PTA/DIET HISTORY
Pt reports eating well PTA; follows a low salt, low CHO diet. Pt denies any recent weight changes. Pt is currently taking and tolerating PO diet well post op; reports consuming % of meals and is enjoying hospital food. Reviewed therapeutic diet guidelines; pt demonstrates good understanding

## 2024-02-02 NOTE — DIETITIAN INITIAL EVALUATION ADULT - OTHER INFO
Pt is a 67 y/o female with PMH of HTN, HLD, left breast cancer (s/p XRT and lumpectomy; 2009), pAF (on Eliquis) and Mitral valve stenosis s/p Mitral valve replacement #29 Perimount Giovanna Jackson with MAZE ablation (April 2013 Dr Zhang [facility]) presents to PST with complaints of worsening shortness of breath. States in 06/2023 she started to notice worsening shortness of breath and STEWART which has gotten progressively worse since. She has also had 2 episodes of atrial fibrillation with cardioversions since 06/2023 with most recent cardioversion 01/2024. She was recently admitted to [facility] with complaints of 3 weeks of worsening SOB and STEWART, acute on chronic diastolic CHF exacerbation, with SARINA showing severe prosthetic mitral stenosis and severe TR, s/p LHC with no obstructive CAD noted.  Patient is scheduled for mitral valve repair, possible replace, tricuspid valve replacement with Dr. Jimenez on 1/31/23.  Admitted1/30 for heparin drip prior to surgery.  Pt now s/p RE-op sternotomy / Re-do MVR(T) TV repair 1/31 with Dr. Jimenez

## 2024-02-03 DIAGNOSIS — D62 ACUTE POSTHEMORRHAGIC ANEMIA: ICD-10-CM

## 2024-02-03 LAB
ANION GAP SERPL CALC-SCNC: 9 MMOL/L — SIGNIFICANT CHANGE UP (ref 5–17)
BUN SERPL-MCNC: 25.3 MG/DL — HIGH (ref 8–20)
CALCIUM SERPL-MCNC: 8.6 MG/DL — SIGNIFICANT CHANGE UP (ref 8.4–10.5)
CHLORIDE SERPL-SCNC: 99 MMOL/L — SIGNIFICANT CHANGE UP (ref 96–108)
CO2 SERPL-SCNC: 25 MMOL/L — SIGNIFICANT CHANGE UP (ref 22–29)
CREAT SERPL-MCNC: 1.04 MG/DL — SIGNIFICANT CHANGE UP (ref 0.5–1.3)
EGFR: 59 ML/MIN/1.73M2 — LOW
GLUCOSE BLDC GLUCOMTR-MCNC: 102 MG/DL — HIGH (ref 70–99)
GLUCOSE BLDC GLUCOMTR-MCNC: 117 MG/DL — HIGH (ref 70–99)
GLUCOSE BLDC GLUCOMTR-MCNC: 118 MG/DL — HIGH (ref 70–99)
GLUCOSE BLDC GLUCOMTR-MCNC: 152 MG/DL — HIGH (ref 70–99)
GLUCOSE SERPL-MCNC: 96 MG/DL — SIGNIFICANT CHANGE UP (ref 70–99)
HCT VFR BLD CALC: 26.3 % — LOW (ref 34.5–45)
HGB BLD-MCNC: 8.6 G/DL — LOW (ref 11.5–15.5)
MAGNESIUM SERPL-MCNC: 2.1 MG/DL — SIGNIFICANT CHANGE UP (ref 1.8–2.6)
MCHC RBC-ENTMCNC: 30.4 PG — SIGNIFICANT CHANGE UP (ref 27–34)
MCHC RBC-ENTMCNC: 32.7 GM/DL — SIGNIFICANT CHANGE UP (ref 32–36)
MCV RBC AUTO: 92.9 FL — SIGNIFICANT CHANGE UP (ref 80–100)
PLATELET # BLD AUTO: 115 K/UL — LOW (ref 150–400)
POTASSIUM SERPL-MCNC: 4.9 MMOL/L — SIGNIFICANT CHANGE UP (ref 3.5–5.3)
POTASSIUM SERPL-SCNC: 4.9 MMOL/L — SIGNIFICANT CHANGE UP (ref 3.5–5.3)
RBC # BLD: 2.83 M/UL — LOW (ref 3.8–5.2)
RBC # FLD: 13.2 % — SIGNIFICANT CHANGE UP (ref 10.3–14.5)
SODIUM SERPL-SCNC: 133 MMOL/L — LOW (ref 135–145)
WBC # BLD: 7.96 K/UL — SIGNIFICANT CHANGE UP (ref 3.8–10.5)
WBC # FLD AUTO: 7.96 K/UL — SIGNIFICANT CHANGE UP (ref 3.8–10.5)

## 2024-02-03 PROCEDURE — 93010 ELECTROCARDIOGRAM REPORT: CPT

## 2024-02-03 PROCEDURE — 71045 X-RAY EXAM CHEST 1 VIEW: CPT | Mod: 26

## 2024-02-03 PROCEDURE — 32557 INSERT CATH PLEURA W/ IMAGE: CPT | Mod: RT

## 2024-02-03 PROCEDURE — 99291 CRITICAL CARE FIRST HOUR: CPT

## 2024-02-03 RX ORDER — FERROUS SULFATE 325(65) MG
325 TABLET ORAL THREE TIMES A DAY
Refills: 0 | Status: DISCONTINUED | OUTPATIENT
Start: 2024-02-03 | End: 2024-02-06

## 2024-02-03 RX ORDER — METOPROLOL TARTRATE 50 MG
25 TABLET ORAL
Refills: 0 | Status: DISCONTINUED | OUTPATIENT
Start: 2024-02-03 | End: 2024-02-08

## 2024-02-03 RX ORDER — BENZOCAINE AND MENTHOL 5; 1 G/100ML; G/100ML
1 LIQUID ORAL ONCE
Refills: 0 | Status: COMPLETED | OUTPATIENT
Start: 2024-02-03 | End: 2024-02-03

## 2024-02-03 RX ORDER — FOLIC ACID 0.8 MG
1 TABLET ORAL DAILY
Refills: 0 | Status: DISCONTINUED | OUTPATIENT
Start: 2024-02-03 | End: 2024-02-08

## 2024-02-03 RX ORDER — SORBITOL SOLUTION 70 %
30 SOLUTION, ORAL MISCELLANEOUS ONCE
Refills: 0 | Status: COMPLETED | OUTPATIENT
Start: 2024-02-03 | End: 2024-02-03

## 2024-02-03 RX ADMIN — Medication 500 MILLIGRAM(S): at 18:10

## 2024-02-03 RX ADMIN — CHLORHEXIDINE GLUCONATE 1 APPLICATION(S): 213 SOLUTION TOPICAL at 12:16

## 2024-02-03 RX ADMIN — Medication 975 MILLIGRAM(S): at 07:05

## 2024-02-03 RX ADMIN — Medication 5 MILLIGRAM(S): at 21:25

## 2024-02-03 RX ADMIN — Medication 25 MILLIGRAM(S): at 07:37

## 2024-02-03 RX ADMIN — Medication 40 MILLIGRAM(S): at 06:28

## 2024-02-03 RX ADMIN — Medication 975 MILLIGRAM(S): at 00:15

## 2024-02-03 RX ADMIN — GABAPENTIN 100 MILLIGRAM(S): 400 CAPSULE ORAL at 06:28

## 2024-02-03 RX ADMIN — MUPIROCIN 1 APPLICATION(S): 20 OINTMENT TOPICAL at 18:59

## 2024-02-03 RX ADMIN — POLYETHYLENE GLYCOL 3350 17 GRAM(S): 17 POWDER, FOR SOLUTION ORAL at 12:15

## 2024-02-03 RX ADMIN — Medication 1 MILLIGRAM(S): at 12:15

## 2024-02-03 RX ADMIN — Medication 25 MILLIGRAM(S): at 18:10

## 2024-02-03 RX ADMIN — PANTOPRAZOLE SODIUM 40 MILLIGRAM(S): 20 TABLET, DELAYED RELEASE ORAL at 12:15

## 2024-02-03 RX ADMIN — Medication 325 MILLIGRAM(S): at 14:27

## 2024-02-03 RX ADMIN — Medication 975 MILLIGRAM(S): at 12:14

## 2024-02-03 RX ADMIN — ENOXAPARIN SODIUM 40 MILLIGRAM(S): 100 INJECTION SUBCUTANEOUS at 12:15

## 2024-02-03 RX ADMIN — OXYCODONE HYDROCHLORIDE 10 MILLIGRAM(S): 5 TABLET ORAL at 22:25

## 2024-02-03 RX ADMIN — GABAPENTIN 100 MILLIGRAM(S): 400 CAPSULE ORAL at 21:24

## 2024-02-03 RX ADMIN — Medication 500 MILLIGRAM(S): at 06:27

## 2024-02-03 RX ADMIN — MUPIROCIN 1 APPLICATION(S): 20 OINTMENT TOPICAL at 06:29

## 2024-02-03 RX ADMIN — GABAPENTIN 100 MILLIGRAM(S): 400 CAPSULE ORAL at 14:27

## 2024-02-03 RX ADMIN — Medication 325 MILLIGRAM(S): at 12:14

## 2024-02-03 RX ADMIN — Medication 325 MILLIGRAM(S): at 06:28

## 2024-02-03 RX ADMIN — OXYCODONE HYDROCHLORIDE 10 MILLIGRAM(S): 5 TABLET ORAL at 02:22

## 2024-02-03 RX ADMIN — OXYCODONE HYDROCHLORIDE 5 MILLIGRAM(S): 5 TABLET ORAL at 12:15

## 2024-02-03 RX ADMIN — OXYCODONE HYDROCHLORIDE 5 MILLIGRAM(S): 5 TABLET ORAL at 18:10

## 2024-02-03 RX ADMIN — OXYCODONE HYDROCHLORIDE 10 MILLIGRAM(S): 5 TABLET ORAL at 21:25

## 2024-02-03 RX ADMIN — SENNA PLUS 2 TABLET(S): 8.6 TABLET ORAL at 21:25

## 2024-02-03 RX ADMIN — Medication 2: at 21:26

## 2024-02-03 RX ADMIN — Medication 325 MILLIGRAM(S): at 21:24

## 2024-02-03 RX ADMIN — Medication 30 MILLILITER(S): at 18:10

## 2024-02-03 RX ADMIN — Medication 975 MILLIGRAM(S): at 06:27

## 2024-02-03 RX ADMIN — BENZOCAINE AND MENTHOL 1 LOZENGE: 5; 1 LIQUID ORAL at 23:15

## 2024-02-03 RX ADMIN — OXYCODONE HYDROCHLORIDE 5 MILLIGRAM(S): 5 TABLET ORAL at 19:03

## 2024-02-03 RX ADMIN — OXYCODONE HYDROCHLORIDE 10 MILLIGRAM(S): 5 TABLET ORAL at 03:00

## 2024-02-03 NOTE — PROGRESS NOTE ADULT - SUBJECTIVE AND OBJECTIVE BOX
Brief summary:  68F PMH of HTN, HLD, left breast cancer (s/p XRT and lumpectomy; 2009), pAF (on Eliquis) and Mitral valve stenosis s/p Mitral valve replacement #29 Perimount Giovanna Jackson with MAZE ablation (April 2013 Dr Zhang [facility]), AF with recent cardioversions, was found to have severe prosthetic mitral stenosis and severe TR when admitted for acute on chronic diastolic CHF exacerbation, was preadmit for heparin gtt and underwent re-op mitral valve replacement, tricuspid valve repair with Dr. Jimenez on 1/31/23.    Overnight events:  Patient educated on postoperative pain, use of IS, and heart pillow for coughing and deep breathing.  Patient denies acute pain with radiating or aggravating factors.  She denies chest pain, shortness of breath, palpitations, headache, dizziness, nausea, or vomiting.     PAST MEDICAL & SURGICAL HISTORY:  Afib      Breast cancer  left; s/p lumpectomy and XRT      Mitral valve insufficiency      Tricuspid valve insufficiency      HTN (hypertension)      HLD (hyperlipidemia)      S/P breast lumpectomy  left      H/O prosthetic mitral valve  bioprosthetic bovine 2013      History of cardiac cath      H/O right wrist surgery          Medications:  acetaminophen     Tablet .. 650 milliGRAM(s) Oral every 6 hours PRN  acetaminophen     Tablet .. 975 milliGRAM(s) Oral every 6 hours  acetaminophen   IVPB .. 1000 milliGRAM(s) IV Intermittent once  ascorbic acid 500 milliGRAM(s) Oral two times a day  aspirin enteric coated 325 milliGRAM(s) Oral daily  bisacodyl Suppository 10 milliGRAM(s) Rectal once  chlorhexidine 2% Cloths 1 Application(s) Topical daily  enoxaparin Injectable 40 milliGRAM(s) SubCutaneous every 24 hours  furosemide    Tablet 40 milliGRAM(s) Oral daily  gabapentin 100 milliGRAM(s) Oral every 8 hours  insulin lispro (ADMELOG) corrective regimen sliding scale   SubCutaneous Before meals and at bedtime  melatonin 5 milliGRAM(s) Oral at bedtime  mupirocin 2% Ointment 1 Application(s) Both Nostrils two times a day  ondansetron Injectable 4 milliGRAM(s) IV Push every 6 hours PRN  oxyCODONE    IR 10 milliGRAM(s) Oral every 4 hours PRN  oxyCODONE    IR 5 milliGRAM(s) Oral every 4 hours PRN  pantoprazole    Tablet 40 milliGRAM(s) Oral daily  polyethylene glycol 3350 17 Gram(s) Oral daily  senna 2 Tablet(s) Oral at bedtime  sodium chloride 0.9%. 1000 milliLiter(s) IV Continuous <Continuous>  sodium chloride 0.9%. 1000 milliLiter(s) IV Continuous <Continuous>      MEDICATIONS  (PRN):  acetaminophen     Tablet .. 650 milliGRAM(s) Oral every 6 hours PRN Mild Pain (1 - 3)  ondansetron Injectable 4 milliGRAM(s) IV Push every 6 hours PRN Nausea and/or Vomiting  oxyCODONE    IR 10 milliGRAM(s) Oral every 4 hours PRN Severe Pain (7 - 10)  oxyCODONE    IR 5 milliGRAM(s) Oral every 4 hours PRN Moderate Pain (4 - 6)      Daily Review:                          8.6    7.96  )-----------( 115      ( 03 Feb 2024 02:06 )             26.3   02-03    133<L>  |  99  |  25.3<H>  ----------------------------<  96  4.9   |  25.0  |  1.04    Ca    8.6      03 Feb 2024 02:06  Mg     2.1     02-03      T(C): 36.8 (02-03-24 @ 00:00), Max: 36.9 (02-02-24 @ 16:05)  HR: 74 (02-03-24 @ 02:20) (52 - 89)  BP: 108/57 (02-03-24 @ 02:00) (107/53 - 171/71)  RR: 16 (02-03-24 @ 02:20) (13 - 35)  SpO2: 93% (02-03-24 @ 02:20) (91% - 100%)  Wt(kg): --    CAPILLARY BLOOD GLUCOSE      POCT Blood Glucose.: 112 mg/dL (02 Feb 2024 23:14)  POCT Blood Glucose.: 100 mg/dL (02 Feb 2024 16:29)  POCT Blood Glucose.: 82 mg/dL (02 Feb 2024 11:34)  POCT Blood Glucose.: 101 mg/dL (02 Feb 2024 07:44)      I&O's Summary    01 Feb 2024 07:01  -  02 Feb 2024 07:00  --------------------------------------------------------  IN: 2020 mL / OUT: 1295 mL / NET: 725 mL    02 Feb 2024 07:01  -  03 Feb 2024 03:09  --------------------------------------------------------  IN: 1620 mL / OUT: 855 mL / NET: 765 mL        Physical Exam  Neuro: A+O x 3, non-focal, speech clear and intact  Pulm: coarse breath sounds bilaterally, no wheezing or rales  CV: irregularly irregular, +S1S2  Abd: soft, NT, ND  Ext: +DP Pulses b/l, +PT pulses, no edema  Inc: Mediastinal sternal incision C/D/I/stable w/ dressing  Chest tubes: chest tubes removed

## 2024-02-03 NOTE — CHART NOTE - NSCHARTNOTEFT_GEN_A_CORE
CTS Addendum ACP note:    Briefly, 68 year old female with PMH of HTN, HLD, left breast cancer (s/p XRT and lumpectomy; 2009), pAF (on Eliquis) and Mitral valve stenosis s/p Mitral valve replacement #29 Perimount Giovanna Jackson with MAZE ablation (April 2013 Dr. Zhang [facility]), AF with recent cardioversions, was found to have severe prosthetic mitral stenosis and severe TR when admitted for acute on chronic diastolic CHF exacerbation, was pre-admit for heparin gtt and underwent re-op mitral valve replacement, tricuspid valve repair with Dr. Jimenez on 1/31/23.        Patient seen & examined, vitals stable, medications, radiologic & laboratory results all reviewed.  Patient in Afib 70's well controlled, as well as epicardial wires attached EPM.  Right chest tube to water seal.   Patient has sternal incisional pain.     PLAN:  Continue Lopressor 25mg BID for rate control & blood pressure.  Maintain right chest tube to water seal  Repeat chest xray tomorrow morning  Will resume Eliquis upon discharge as per Dr. Jimenez  Discussed plan with Dr. Jimenez & CTS PA.

## 2024-02-03 NOTE — PROGRESS NOTE ADULT - REASON FOR ADMISSION
MVS , TR
MVS , TR
Mitral stenosis   Tricuspid regurgitation
MVS , TR
Pre op for reopsternotomy Mitral valve replacement tricuspid valve repair or replacement

## 2024-02-03 NOTE — PROCEDURE NOTE - NSPROCDETAILS_GEN_ALL_CORE
ultrasound assessment of fluid (location)
guidewire recovered/lumen(s) aspirated and flushed/sterile dressing applied/sterile technique, catheter placed/ultrasound guidance with use of sterile gel and probe cove

## 2024-02-03 NOTE — PROCEDURE NOTE - NSCOMPLICATION_GEN_A_CORE
Per 5/20/19 E-advice message, per Dr. Alejandre-      Can we determine what Alice's out of pocket cost would be?      Overall, the risk to the fetus for standard dose QVAR or Flovent is still quite low but I'd still prefer  inhaled budesonide for Alice.      If she feels the same way, we can obtain budesonide inhaler from [facility].     Contacted [employer], per PT the out of pocket cost for patients Pulmicort would be $507.14.     Left message for the patient to contact office, left hours of operation and office number.      Amara Abdi RN   
Pt is going to obtain medication from [facility] and has been informed to  script at [facility] location. See 6/4/19 telephone encounter.     Adamaris Roberson RN    
no complications
no complications

## 2024-02-03 NOTE — PROGRESS NOTE ADULT - SUBJECTIVE AND OBJECTIVE BOX
GLORY MULLINS  MRN-[id number]    HPI:  67 y/o female with PMH of HTN, HLD, left breast cancer (s/p XRT and lumpectomy; 2009), pAF (on Eliquis) and Mitral valve stenosis s/p Mitral valve replacement #29 Perimount Giovanna Jackson with MAZE ablation (April 2013 Dr Zhang [facility]) presents to PST with complaints of worsening shortness of breath. States in 06/2023 she started to notice worsening shortness of breath and STEWART which has gotten progressively worse since. She has also had 2 episodes of atrial fibrillation with cardioversions since 06/2023 with most recent cardioversion 01/2024. She was recently admitted to [facility] with complaints of 3 weeks of worsening SOB and STEWART, acute on chronic diastolic CHF exacerbation, with SARINA showing severe prosthetic mitral stenosis and severe TR, s/p LHC with no obstructive CAD noted. Denies chest pain, palpitations, dizziness, lightheadedness or near syncope. Patient is scheduled for mitral valve repair, possible replace, tricuspid valve replacement with Dr. Jimenez on 1/31/23, to be pre- admitted on 1/30/24.  (18 Jan 2024 09:29)    Surgery/Hospital Course:  ·  PRE-OP DIAGNOSIS:  Mitral stenosis   Tricuspid regurgitation   ·  POST-OP DIAGNOSIS:  Mitral stenosis   Tricuspid regurgitation  ·  PROCEDURES:  Repeat sternotomy 31-Jan-2024   Replacement, mitral valve, with SARINA, adult, 29mm Pizarro Mitral Valve   Repair of tricuspid valve in adult , 28mm Triad Ring -  Today:  No acute events--  -R chest tube insertion today      ICU Vital Signs Last 24 Hrs  T(C): 36.8 (03 Feb 2024 14:00), Max: 36.9 (02 Feb 2024 16:05)  T(F): 98.2 (03 Feb 2024 14:00), Max: 98.5 (02 Feb 2024 20:05)  HR: 64 (03 Feb 2024 14:00) (64 - 94)  BP: 109/68 (03 Feb 2024 14:00) (105/58 - 166/74)  BP(mean): 84 (03 Feb 2024 10:00) (75 - 119)  ABP: --  ABP(mean): --  RR: 20 (03 Feb 2024 14:00) (13 - 43)  SpO2: 97% (03 Feb 2024 14:00) (91% - 99%)    O2 Parameters below as of 03 Feb 2024 14:00  Patient On (Oxygen Delivery Method): nasal cannula  O2 Flow (L/min): 2          Physical Exam:  Gen: A&O   CNS: non focal 	  Neck: no JVD  RES : clear , no wheezing              CVS: Regular  rhythm. Normal S1/S2  Abd: Soft, non-distended. Bowel sounds present.  Skin: No rash.  Ext:  no edema    ============================I/O===========================   I&O's Detail    02 Feb 2024 07:01  -  03 Feb 2024 07:00  --------------------------------------------------------  IN:    IV PiggyBack: 50 mL    IV PiggyBack: 250 mL    Oral Fluid: 1020 mL    sodium chloride 0.9%: 100 mL    sodium chloride 0.9%: 200 mL  Total IN: 1620 mL    OUT:    Indwelling Catheter - Urethral (mL): 855 mL  Total OUT: 855 mL    Total NET: 765 mL      03 Feb 2024 07:01  -  03 Feb 2024 14:06  --------------------------------------------------------  IN:  Total IN: 0 mL    OUT:    Chest Tube (mL): 600 mL  Total OUT: 600 mL    Total NET: -600 mL        ============================ LABS =========================                        8.6    7.96  )-----------( 115      ( 03 Feb 2024 02:06 )             26.3     02-03    133<L>  |  99  |  25.3<H>  ----------------------------<  96  4.9   |  25.0  |  1.04    Ca    8.6      03 Feb 2024 02:06  Mg     2.1     02-03            Urinalysis Basic - ( 03 Feb 2024 02:06 )    Color: x / Appearance: x / SG: x / pH: x  Gluc: 96 mg/dL / Ketone: x  / Bili: x / Urobili: x   Blood: x / Protein: x / Nitrite: x   Leuk Esterase: x / RBC: x / WBC x   Sq Epi: x / Non Sq Epi: x / Bacteria: x      ======================Micro/Rad/Cardio=================  Culture: Reviewed   CXR: Reviewed  Echo:Reviewed  ======================================================  PAST MEDICAL & SURGICAL HISTORY:  Afib      Breast cancer  left; s/p lumpectomy and XRT      Mitral valve insufficiency      Tricuspid valve insufficiency      HTN (hypertension)      HLD (hyperlipidemia)      S/P breast lumpectomy  left      H/O prosthetic mitral valve  bioprosthetic bovine 2013      History of cardiac cath      H/O right wrist surgery        ====================ASSESSMENT ==============  67 y/o female with PMH of HTN, HLD, left breast cancer (s/p XRT and lumpectomy; 2009), pAF (on Eliquis) and Mitral valve stenosis s/p Mitral valve replacement #29 Perimount Giovanna Jackson with MAZE ablation (April 2013 Dr Zhang [facility]) presents to PST with complaints of worsening shortness of breath. States in 06/2023 she started to notice worsening shortness of breath and STEWART which has gotten progressively worse since. She has also had 2 episodes of atrial fibrillation with cardioversions since 06/2023 with most recent cardioversion 01/2024. She was recently admitted to [facility] with complaints of 3 weeks of worsening SOB and STEWART, acute on chronic diastolic CHF exacerbation, with SARINA showing severe prosthetic mitral stenosis and severe TR, s/p LHC with no obstructive CAD noted. Denies chest pain, palpitations, dizziness, lightheadedness or near syncope. Patient is scheduled for mitral valve repair, possible replace, tricuspid valve replacement with Dr. Jimenez on 1/31/23, to be pre- admitted on 1/30/24.  (18 Jan 2024 09:29)    ---Afib  ---Breast cancer  ---HTN (hypertension)  ---HLD (hyperlipidemia)  ---H/O prosthetic mitral valve  ---History of cardiac cath  ---Mitral stenosis   ---Tricuspid regurgitation  ---S/p Repeat sternotomy 31-Jan-2024               Replacement, mitral valve, with SARINA, adult, 29mm Pizarro Mitral Valve               Repair of tricuspid valve in adult , 28mm Triad Ring -  ---Post op Hypovolemic shock  ---Post op respiratory insufficiency   ---Post op right pleural effusion    Plan:-  -Lipitor for chronic graft patency prophylaxis  -Aspirin for acute graft closure prophylaxis  -Chest PT and IS use with bedside nurse  -dash diet.  -Protonix for GI prophylaxis.  -Lovenox/SCDs for DVT prophylaxis.--  -R chest tube insertion  -Eliquis BID on discharge-    ====================== NEUROLOGY=====================  acetaminophen     Tablet .. 650 milliGRAM(s) Oral every 6 hours PRN Mild Pain (1 - 3)  gabapentin 100 milliGRAM(s) Oral every 8 hours  melatonin 5 milliGRAM(s) Oral at bedtime  ondansetron Injectable 4 milliGRAM(s) IV Push every 6 hours PRN Nausea and/or Vomiting  oxyCODONE    IR 5 milliGRAM(s) Oral every 4 hours PRN Moderate Pain (4 - 6)  oxyCODONE    IR 10 milliGRAM(s) Oral every 4 hours PRN Severe Pain (7 - 10)    ==================== RESPIRATORY======================  Post op respiratory insufficiency  ====================CARDIOVASCULAR==================  Post op Hypovolemia  furosemide    Tablet 40 milliGRAM(s) Oral daily  metoprolol tartrate 25 milliGRAM(s) Oral two times a day    ===================HEMATOLOGIC/ONC ===================  Monitor H&H/Plts    aspirin enteric coated 325 milliGRAM(s) Oral daily  enoxaparin Injectable 40 milliGRAM(s) SubCutaneous every 24 hours    ===================== RENAL =========================  Continue monitoring urine output, I&OS, BUN/Cr     ==================== GASTROINTESTINAL===================  ascorbic acid 500 milliGRAM(s) Oral two times a day  bisacodyl Suppository 10 milliGRAM(s) Rectal once  ferrous    sulfate 325 milliGRAM(s) Oral three times a day  folic acid 1 milliGRAM(s) Oral daily  pantoprazole    Tablet 40 milliGRAM(s) Oral daily  polyethylene glycol 3350 17 Gram(s) Oral daily  senna 2 Tablet(s) Oral at bedtime  sodium chloride 0.9%. 1000 milliLiter(s) (10 mL/Hr) IV Continuous <Continuous>  sodium chloride 0.9%. 1000 milliLiter(s) (5 mL/Hr) IV Continuous <Continuous>    =======================    ENDOCRINE  =====================  insulin lispro (ADMELOG) corrective regimen sliding scale   SubCutaneous Before meals and at bedtime    ========================INFECTIOUS DISEASE================      -Monitor Neurologic status ,   -Head of the bed should remain elevated to 45 degrees,  -Monitor for arrhythmias and monitor parameters for organ perfusion,  -Glycemic control is satisfactory,  -Nutritional goals will be met using po eventually , insure adequate caloric intake and monitor the same ,  -Electrolytes have been repleted as necessary , pain control has been achieved  and wound care has been carried out ,  -Stress ulcer and VTE prophylaxis will be achieved,  -Agressive PT and early mobility and ambulation goals will be met,    I have spent 35 minutes providing acute care for this critically ill patient     Patient requires continuous monitoring with bedside rhythm monitoring, pulse ox monitoring, and intermittent blood gas analysis. Care plan discussed with ICU care team. Patient remained critical and at risk for life threatening decompensation.

## 2024-02-04 LAB
ANION GAP SERPL CALC-SCNC: 10 MMOL/L — SIGNIFICANT CHANGE UP (ref 5–17)
BUN SERPL-MCNC: 22.7 MG/DL — HIGH (ref 8–20)
CALCIUM SERPL-MCNC: 8.7 MG/DL — SIGNIFICANT CHANGE UP (ref 8.4–10.5)
CHLORIDE SERPL-SCNC: 98 MMOL/L — SIGNIFICANT CHANGE UP (ref 96–108)
CO2 SERPL-SCNC: 26 MMOL/L — SIGNIFICANT CHANGE UP (ref 22–29)
CREAT SERPL-MCNC: 0.88 MG/DL — SIGNIFICANT CHANGE UP (ref 0.5–1.3)
EGFR: 72 ML/MIN/1.73M2 — SIGNIFICANT CHANGE UP
GLUCOSE BLDC GLUCOMTR-MCNC: 103 MG/DL — HIGH (ref 70–99)
GLUCOSE BLDC GLUCOMTR-MCNC: 105 MG/DL — HIGH (ref 70–99)
GLUCOSE BLDC GLUCOMTR-MCNC: 131 MG/DL — HIGH (ref 70–99)
GLUCOSE BLDC GLUCOMTR-MCNC: 133 MG/DL — HIGH (ref 70–99)
GLUCOSE SERPL-MCNC: 95 MG/DL — SIGNIFICANT CHANGE UP (ref 70–99)
HCT VFR BLD CALC: 26.6 % — LOW (ref 34.5–45)
HGB BLD-MCNC: 8.6 G/DL — LOW (ref 11.5–15.5)
MAGNESIUM SERPL-MCNC: 1.9 MG/DL — SIGNIFICANT CHANGE UP (ref 1.6–2.6)
MCHC RBC-ENTMCNC: 30.7 PG — SIGNIFICANT CHANGE UP (ref 27–34)
MCHC RBC-ENTMCNC: 32.3 GM/DL — SIGNIFICANT CHANGE UP (ref 32–36)
MCV RBC AUTO: 95 FL — SIGNIFICANT CHANGE UP (ref 80–100)
PLATELET # BLD AUTO: 136 K/UL — LOW (ref 150–400)
POTASSIUM SERPL-MCNC: 4.8 MMOL/L — SIGNIFICANT CHANGE UP (ref 3.5–5.3)
POTASSIUM SERPL-SCNC: 4.8 MMOL/L — SIGNIFICANT CHANGE UP (ref 3.5–5.3)
RBC # BLD: 2.8 M/UL — LOW (ref 3.8–5.2)
RBC # FLD: 13.3 % — SIGNIFICANT CHANGE UP (ref 10.3–14.5)
SODIUM SERPL-SCNC: 134 MMOL/L — LOW (ref 135–145)
WBC # BLD: 6.91 K/UL — SIGNIFICANT CHANGE UP (ref 3.8–10.5)
WBC # FLD AUTO: 6.91 K/UL — SIGNIFICANT CHANGE UP (ref 3.8–10.5)

## 2024-02-04 PROCEDURE — 71045 X-RAY EXAM CHEST 1 VIEW: CPT | Mod: 26,77

## 2024-02-04 PROCEDURE — 71045 X-RAY EXAM CHEST 1 VIEW: CPT | Mod: 26

## 2024-02-04 PROCEDURE — 99024 POSTOP FOLLOW-UP VISIT: CPT

## 2024-02-04 RX ORDER — LIDOCAINE 4 G/100G
1 CREAM TOPICAL ONCE
Refills: 0 | Status: COMPLETED | OUTPATIENT
Start: 2024-02-04 | End: 2024-02-04

## 2024-02-04 RX ORDER — ACETAMINOPHEN 500 MG
1000 TABLET ORAL ONCE
Refills: 0 | Status: COMPLETED | OUTPATIENT
Start: 2024-02-04 | End: 2024-02-05

## 2024-02-04 RX ORDER — KETOROLAC TROMETHAMINE 30 MG/ML
15 SYRINGE (ML) INJECTION ONCE
Refills: 0 | Status: DISCONTINUED | OUTPATIENT
Start: 2024-02-04 | End: 2024-02-04

## 2024-02-04 RX ADMIN — OXYCODONE HYDROCHLORIDE 10 MILLIGRAM(S): 5 TABLET ORAL at 15:35

## 2024-02-04 RX ADMIN — Medication 5 MILLIGRAM(S): at 21:46

## 2024-02-04 RX ADMIN — Medication 25 MILLIGRAM(S): at 18:01

## 2024-02-04 RX ADMIN — ENOXAPARIN SODIUM 40 MILLIGRAM(S): 100 INJECTION SUBCUTANEOUS at 13:56

## 2024-02-04 RX ADMIN — Medication 25 MILLIGRAM(S): at 06:13

## 2024-02-04 RX ADMIN — LIDOCAINE 1 PATCH: 4 CREAM TOPICAL at 19:25

## 2024-02-04 RX ADMIN — Medication 500 MILLIGRAM(S): at 18:02

## 2024-02-04 RX ADMIN — SENNA PLUS 2 TABLET(S): 8.6 TABLET ORAL at 21:46

## 2024-02-04 RX ADMIN — Medication 325 MILLIGRAM(S): at 06:13

## 2024-02-04 RX ADMIN — OXYCODONE HYDROCHLORIDE 10 MILLIGRAM(S): 5 TABLET ORAL at 16:03

## 2024-02-04 RX ADMIN — GABAPENTIN 100 MILLIGRAM(S): 400 CAPSULE ORAL at 13:57

## 2024-02-04 RX ADMIN — OXYCODONE HYDROCHLORIDE 10 MILLIGRAM(S): 5 TABLET ORAL at 06:13

## 2024-02-04 RX ADMIN — Medication 15 MILLIGRAM(S): at 22:15

## 2024-02-04 RX ADMIN — Medication 325 MILLIGRAM(S): at 21:46

## 2024-02-04 RX ADMIN — PANTOPRAZOLE SODIUM 40 MILLIGRAM(S): 20 TABLET, DELAYED RELEASE ORAL at 13:57

## 2024-02-04 RX ADMIN — CHLORHEXIDINE GLUCONATE 1 APPLICATION(S): 213 SOLUTION TOPICAL at 14:02

## 2024-02-04 RX ADMIN — Medication 325 MILLIGRAM(S): at 13:57

## 2024-02-04 RX ADMIN — POLYETHYLENE GLYCOL 3350 17 GRAM(S): 17 POWDER, FOR SOLUTION ORAL at 13:57

## 2024-02-04 RX ADMIN — Medication 500 MILLIGRAM(S): at 06:12

## 2024-02-04 RX ADMIN — LIDOCAINE 1 PATCH: 4 CREAM TOPICAL at 16:27

## 2024-02-04 RX ADMIN — GABAPENTIN 100 MILLIGRAM(S): 400 CAPSULE ORAL at 06:13

## 2024-02-04 RX ADMIN — Medication 1 MILLIGRAM(S): at 13:57

## 2024-02-04 RX ADMIN — Medication 15 MILLIGRAM(S): at 21:45

## 2024-02-04 RX ADMIN — Medication 40 MILLIGRAM(S): at 06:13

## 2024-02-04 NOTE — PROGRESS NOTE ADULT - SUBJECTIVE AND OBJECTIVE BOX
POD #4 s/p Repeat sternotomy, Mitral Valve Replacement (29mm Pizarro), Repair of tricuspid valve (28mm Triad Ring)    PAST MEDICAL & SURGICAL HISTORY:  Afib  Breast cancer, left; s/p lumpectomy and XRT  Mitral valve insufficiency  Tricuspid valve insufficiency  HTN (hypertension)  HLD (hyperlipidemia)  S/P breast lumpectomy, left  H/O prosthetic mitral valve, bioprosthetic bovine 2013  History of cardiac cath  H/O right wrist surgery    FAMILY HISTORY:  FH: breast cancer (Mother, Sibling, Aunt)  FH: leukemia (Mother)    Brief Hospital Course: 68 year old female with a PMHx of HTN, HLD, Left Breast Cancer (s/p XRT and lumpectomy; 2009), pAF (on Eliquis), and Mitral valve stenosis s/p Mitral valve replacement (#29 Perimount Giovanna Jackson) with MAZE ablation (April 2013 with Dr. Zhang at [facility]), recent cardioversions for afib, with patient found to have Severe prosthetic mitral stenosis and Severe TR when admitted for acute on chronic diastolic CHF exacerbation, was preadmitted for a heparin gtt and underwent Re-op mitral valve replacement, tricuspid valve repair with Dr. Jimenez on 1/31/23. Postop course significant for ABLA (stable), and thrombocytopenia (stable).     Significant recent/past 24 hr events: No overnight events reported.    Subjective: Patient lying in bed in NAD. +Tolerating diet. +Passing gas. +Pain currently controlled. Denies fevers, chills, lightheadedness, dizziness, HA, CP, palpitations, SOB, cough, abdominal pain, N/V, diarrhea, numbness/tingling in extremities, or any other acute complaints. ROS negative x 10 systems except as noted above.    MEDICATIONS  (STANDING):  ascorbic acid 500 milliGRAM(s) Oral two times a day  aspirin enteric coated 325 milliGRAM(s) Oral daily  bisacodyl Suppository 10 milliGRAM(s) Rectal once  chlorhexidine 2% Cloths 1 Application(s) Topical daily  enoxaparin Injectable 40 milliGRAM(s) SubCutaneous every 24 hours  ferrous    sulfate 325 milliGRAM(s) Oral three times a day  folic acid 1 milliGRAM(s) Oral daily  furosemide    Tablet 40 milliGRAM(s) Oral daily  gabapentin 100 milliGRAM(s) Oral every 8 hours  insulin lispro (ADMELOG) corrective regimen sliding scale   SubCutaneous Before meals and at bedtime  melatonin 5 milliGRAM(s) Oral at bedtime  metoprolol tartrate 25 milliGRAM(s) Oral two times a day  pantoprazole    Tablet 40 milliGRAM(s) Oral daily  polyethylene glycol 3350 17 Gram(s) Oral daily  senna 2 Tablet(s) Oral at bedtime    MEDICATIONS  (PRN):  acetaminophen     Tablet .. 650 milliGRAM(s) Oral every 6 hours PRN Mild Pain (1 - 3)  bisacodyl Suppository 10 milliGRAM(s) Rectal daily PRN Constipation  ondansetron Injectable 4 milliGRAM(s) IV Push every 6 hours PRN Nausea and/or Vomiting  oxyCODONE    IR 5 milliGRAM(s) Oral every 4 hours PRN Moderate Pain (4 - 6)  oxyCODONE    IR 10 milliGRAM(s) Oral every 4 hours PRN Severe Pain (7 - 10)    Allergies: No Known Allergies    Vitals   T(C): 36.7 (04 Feb 2024 01:00), Max: 36.9 (03 Feb 2024 16:36)  T(F): 98 (04 Feb 2024 01:00), Max: 98.5 (03 Feb 2024 16:36)  HR: 70 (04 Feb 2024 01:00) (64 - 94)  BP: 103/57 (04 Feb 2024 01:00) (102/61 - 147/67)  BP(mean): 82 (03 Feb 2024 16:36) (82 - 96)  RR: 18 (04 Feb 2024 01:00) (16 - 43)  SpO2: 96% (04 Feb 2024 01:00) (94% - 100%)  O2 Parameters below as of 03 Feb 2024 20:47  Patient On (Oxygen Delivery Method): nasal cannula  O2 Flow (L/min): 2    I&O's Detail    02 Feb 2024 07:01  -  03 Feb 2024 07:00  --------------------------------------------------------  IN:    IV PiggyBack: 50 mL    IV PiggyBack: 250 mL    Oral Fluid: 1020 mL    sodium chloride 0.9%: 100 mL    sodium chloride 0.9%: 200 mL  Total IN: 1620 mL    OUT:    Indwelling Catheter - Urethral (mL): 855 mL  Total OUT: 855 mL    Total NET: 765 mL      03 Feb 2024 07:01  -  04 Feb 2024 04:57  --------------------------------------------------------  IN:    Oral Fluid: 220 mL  Total IN: 220 mL    OUT:    Chest Tube (mL): 619 mL  Total OUT: 619 mL    Total NET: -399 mL    Physical Exam  General: Lying in bed in NAD  Neuro: A+O x 3, non-focal, speech clear and intact  HEENT:  NCAT. No conjuctival edema or icterus, no thrush.    Neck:  Supple, trachea midline  Pulm: CTA bilaterally, no rales/rhonchi/wheezing, no accessory muscle use noted  Chest:        Right pigtail catheter to water seal with dressing intact and no air leak, no subQ emphysema       +PW (isolated)  CV: regular rate, regular rhythm, +S1S2, no murmur noted  Abd: soft, NT, ND, + BS  Ext: CHAPMAN x 4, trace edema, no cyanosis, distal motor/neuro/circ intact  Skin: warm, dry, perfused  Incisions: midsternal mepilex C/D/I, sternum stable    LABS                        8.6    7.96  )-----------( 115      ( 03 Feb 2024 02:06 )             26.3     02-03    133<L>  |  99  |  25.3<H>  ----------------------------<  96  4.9   |  25.0  |  1.04    Ca    8.6      03 Feb 2024 02:06  Mg     2.1     02-03    Urinalysis Basic - ( 03 Feb 2024 02:06 )  Color: x / Appearance: x / SG: x / pH: x  Gluc: 96 mg/dL / Ketone: x  / Bili: x / Urobili: x   Blood: x / Protein: x / Nitrite: x   Leuk Esterase: x / RBC: x / WBC x   Sq Epi: x / Non Sq Epi: x / Bacteria: x    POCT Blood Glucose.: 152 mg/dL (02-03-24 @ 21:22)  POCT Blood Glucose.: 117 mg/dL (02-03-24 @ 17:13)  POCT Blood Glucose.: 118 mg/dL (02-03-24 @ 11:55)  POCT Blood Glucose.: 102 mg/dL (02-03-24 @ 08:11)      Last CXR:  < from: Xray Chest 1 View- PORTABLE-Urgent (Xray Chest 1 View- PORTABLE-Urgent .) (02.03.24 @ 12:54) >  IMPRESSION:  First study is from 2/3, 6:08 AM and shows Right sided central line is in the region of the superior vena cava.  HEART: enlarged. Prosthetic mitral valve  LUNGS: Bibasilar opacities compatible with effusion/infiltrate, right greater than left.  BONES: sternotomy wires.  The follow-up is from 1233 and shows pigtail catheter right base..   Resolution of the right effusion. Trace right apical pneumothorax. Small left effusion.  < end of copied text >

## 2024-02-05 ENCOUNTER — TRANSCRIPTION ENCOUNTER (OUTPATIENT)
Age: 69
End: 2024-02-05

## 2024-02-05 LAB
ANION GAP SERPL CALC-SCNC: 11 MMOL/L — SIGNIFICANT CHANGE UP (ref 5–17)
BUN SERPL-MCNC: 33.2 MG/DL — HIGH (ref 8–20)
CALCIUM SERPL-MCNC: 8.5 MG/DL — SIGNIFICANT CHANGE UP (ref 8.4–10.5)
CHLORIDE SERPL-SCNC: 98 MMOL/L — SIGNIFICANT CHANGE UP (ref 96–108)
CO2 SERPL-SCNC: 26 MMOL/L — SIGNIFICANT CHANGE UP (ref 22–29)
CREAT SERPL-MCNC: 1.12 MG/DL — SIGNIFICANT CHANGE UP (ref 0.5–1.3)
EGFR: 54 ML/MIN/1.73M2 — LOW
GLUCOSE SERPL-MCNC: 109 MG/DL — HIGH (ref 70–99)
HCT VFR BLD CALC: 26.1 % — LOW (ref 34.5–45)
HGB BLD-MCNC: 8.8 G/DL — LOW (ref 11.5–15.5)
MAGNESIUM SERPL-MCNC: 1.8 MG/DL — SIGNIFICANT CHANGE UP (ref 1.6–2.6)
MCHC RBC-ENTMCNC: 31.2 PG — SIGNIFICANT CHANGE UP (ref 27–34)
MCHC RBC-ENTMCNC: 33.7 GM/DL — SIGNIFICANT CHANGE UP (ref 32–36)
MCV RBC AUTO: 92.6 FL — SIGNIFICANT CHANGE UP (ref 80–100)
PLATELET # BLD AUTO: 163 K/UL — SIGNIFICANT CHANGE UP (ref 150–400)
POTASSIUM SERPL-MCNC: 4.5 MMOL/L — SIGNIFICANT CHANGE UP (ref 3.5–5.3)
POTASSIUM SERPL-SCNC: 4.5 MMOL/L — SIGNIFICANT CHANGE UP (ref 3.5–5.3)
RBC # BLD: 2.82 M/UL — LOW (ref 3.8–5.2)
RBC # FLD: 12.9 % — SIGNIFICANT CHANGE UP (ref 10.3–14.5)
SODIUM SERPL-SCNC: 135 MMOL/L — SIGNIFICANT CHANGE UP (ref 135–145)
WBC # BLD: 6.27 K/UL — SIGNIFICANT CHANGE UP (ref 3.8–10.5)
WBC # FLD AUTO: 6.27 K/UL — SIGNIFICANT CHANGE UP (ref 3.8–10.5)

## 2024-02-05 PROCEDURE — 99024 POSTOP FOLLOW-UP VISIT: CPT

## 2024-02-05 PROCEDURE — 71045 X-RAY EXAM CHEST 1 VIEW: CPT | Mod: 26

## 2024-02-05 PROCEDURE — 93306 TTE W/DOPPLER COMPLETE: CPT | Mod: 26

## 2024-02-05 RX ORDER — KETOROLAC TROMETHAMINE 30 MG/ML
15 SYRINGE (ML) INJECTION ONCE
Refills: 0 | Status: DISCONTINUED | OUTPATIENT
Start: 2024-02-05 | End: 2024-02-05

## 2024-02-05 RX ORDER — LIDOCAINE 4 G/100G
1 CREAM TOPICAL ONCE
Refills: 0 | Status: COMPLETED | OUTPATIENT
Start: 2024-02-05 | End: 2024-02-05

## 2024-02-05 RX ORDER — TRAMADOL HYDROCHLORIDE 50 MG/1
25 TABLET ORAL EVERY 4 HOURS
Refills: 0 | Status: DISCONTINUED | OUTPATIENT
Start: 2024-02-05 | End: 2024-02-08

## 2024-02-05 RX ORDER — ACETAMINOPHEN 500 MG
650 TABLET ORAL EVERY 6 HOURS
Refills: 0 | Status: DISCONTINUED | OUTPATIENT
Start: 2024-02-05 | End: 2024-02-08

## 2024-02-05 RX ORDER — MAGNESIUM SULFATE 500 MG/ML
2 VIAL (ML) INJECTION ONCE
Refills: 0 | Status: COMPLETED | OUTPATIENT
Start: 2024-02-05 | End: 2024-02-05

## 2024-02-05 RX ADMIN — LIDOCAINE 1 PATCH: 4 CREAM TOPICAL at 05:30

## 2024-02-05 RX ADMIN — Medication 325 MILLIGRAM(S): at 13:25

## 2024-02-05 RX ADMIN — Medication 1000 MILLIGRAM(S): at 20:39

## 2024-02-05 RX ADMIN — Medication 325 MILLIGRAM(S): at 05:29

## 2024-02-05 RX ADMIN — TRAMADOL HYDROCHLORIDE 25 MILLIGRAM(S): 50 TABLET ORAL at 13:30

## 2024-02-05 RX ADMIN — POLYETHYLENE GLYCOL 3350 17 GRAM(S): 17 POWDER, FOR SOLUTION ORAL at 12:26

## 2024-02-05 RX ADMIN — Medication 15 MILLIGRAM(S): at 06:15

## 2024-02-05 RX ADMIN — PANTOPRAZOLE SODIUM 40 MILLIGRAM(S): 20 TABLET, DELAYED RELEASE ORAL at 12:22

## 2024-02-05 RX ADMIN — Medication 25 MILLIGRAM(S): at 17:25

## 2024-02-05 RX ADMIN — Medication 325 MILLIGRAM(S): at 21:55

## 2024-02-05 RX ADMIN — Medication 25 GRAM(S): at 09:27

## 2024-02-05 RX ADMIN — ENOXAPARIN SODIUM 40 MILLIGRAM(S): 100 INJECTION SUBCUTANEOUS at 22:26

## 2024-02-05 RX ADMIN — Medication 15 MILLIGRAM(S): at 14:56

## 2024-02-05 RX ADMIN — Medication 5 MILLIGRAM(S): at 21:55

## 2024-02-05 RX ADMIN — Medication 500 MILLIGRAM(S): at 05:24

## 2024-02-05 RX ADMIN — LIDOCAINE 1 PATCH: 4 CREAM TOPICAL at 05:23

## 2024-02-05 RX ADMIN — Medication 40 MILLIGRAM(S): at 05:24

## 2024-02-05 RX ADMIN — Medication 25 MILLIGRAM(S): at 05:24

## 2024-02-05 RX ADMIN — TRAMADOL HYDROCHLORIDE 25 MILLIGRAM(S): 50 TABLET ORAL at 22:30

## 2024-02-05 RX ADMIN — CHLORHEXIDINE GLUCONATE 1 APPLICATION(S): 213 SOLUTION TOPICAL at 11:42

## 2024-02-05 RX ADMIN — Medication 325 MILLIGRAM(S): at 12:21

## 2024-02-05 RX ADMIN — Medication 1 MILLIGRAM(S): at 12:21

## 2024-02-05 RX ADMIN — Medication 400 MILLIGRAM(S): at 20:09

## 2024-02-05 RX ADMIN — SENNA PLUS 2 TABLET(S): 8.6 TABLET ORAL at 21:55

## 2024-02-05 RX ADMIN — TRAMADOL HYDROCHLORIDE 25 MILLIGRAM(S): 50 TABLET ORAL at 12:22

## 2024-02-05 RX ADMIN — Medication 15 MILLIGRAM(S): at 05:29

## 2024-02-05 RX ADMIN — LIDOCAINE 1 PATCH: 4 CREAM TOPICAL at 17:10

## 2024-02-05 RX ADMIN — TRAMADOL HYDROCHLORIDE 25 MILLIGRAM(S): 50 TABLET ORAL at 21:55

## 2024-02-05 RX ADMIN — Medication 15 MILLIGRAM(S): at 15:40

## 2024-02-05 NOTE — DISCHARGE NOTE PROVIDER - NSDCFUADDAPPT_GEN_ALL_CORE_FT
Please follow up with Dr. Jimenez on ___________.  The cardiac surgery office is located at [facility], first floor. Take a left at the end of the lobby until the end of that peep (past the elevator bank). Make a left and the office is on your right across from the elevators.    Your Care Navigator Nurse Practitioner will be in touch to see you in your home within a few days from discharge. The Follow Your Heart program can help ensure you understand your medications, discharge instructions and answer any questions you may have at that time. They are also a great source to address concerns during the day and may be reached at [phone].    Please follow up with your Cardiologist and Primary Care Physician 2-4 weeks from discharge. Please follow up with Dr. Jimenez on Tues 2/13 at 9:15AM.  The cardiac surgery office is located at [facility], first floor. Take a left at the end of the lobby until the end of that pepe (past the elevator bank). Make a left and the office is on your right across from the elevators.    Your Care Navigator Nurse Practitioner will be in touch to see you in your home within a few days from discharge. The Follow Your Heart program can help ensure you understand your medications, discharge instructions and answer any questions you may have at that time. They are also a great source to address concerns during the day and may be reached at [phone].    Please follow up with your Cardiologist and Primary Care Physician 1-2 weeks.

## 2024-02-05 NOTE — DISCHARGE NOTE PROVIDER - NSDCFUADDINST_GEN_ALL_CORE_FT
Please call the Cardiothoracic Surgery office at [phone] if you are experiencing any shortness of breath, chest pain, fevers or chills, drainage from the incisions, persistent nausea, vomiting or if you have any questions about your medications. If the symptoms are severe, call 911 and go to the nearest hospital. You can also call [phone] for an emergency [facility] ambulance, which will take you to the closest [facility].    If you need any assistance for making any appointments for a new consult or referral in any specialty, please call our [facility] Clinical Coordination Center at [phone].

## 2024-02-05 NOTE — DISCHARGE NOTE PROVIDER - CARE PROVIDER_API CALL
Josue Jimenez  Thoracic and Cardiac Surgery  [address] [place] [id number]  Phone: [phone]  Fax: [phone]  Follow Up Time:     John Wilkerson  Interventional Cardiology  [address] [place] [address]  Phone: [phone]  Fax: [phone]  Follow Up Time:

## 2024-02-05 NOTE — DISCHARGE NOTE PROVIDER - NSDCFUSCHEDAPPT_GEN_ALL_CORE_FT
[facility]  [facility] [address]  Scheduled Appointment: 02/13/2024

## 2024-02-05 NOTE — DISCHARGE NOTE PROVIDER - CARE PROVIDERS DIRECT ADDRESSES
[email or web address]

## 2024-02-05 NOTE — DISCHARGE NOTE PROVIDER - NSDCCPCAREPLAN_GEN_ALL_CORE_FT
PRINCIPAL DISCHARGE DIAGNOSIS  Diagnosis: Mitral valve insufficiency  Assessment and Plan of Treatment: Please refer to discharge instructions in your folder.  Shower daily with soap and water to the incision. No bathing/hot tubs/pools/swimming. No lotions/creams to incisions.  Take all your medications as prescribed.  Keep your follow up appointments.  You will receive a wallet card about your new valve in the mail.  Please carry it with you to present to anyone who may ask if you have any medical implants.  Be sure to inform your doctors including your dentist about your valve since you will need to take antibiotics to reduce the risk of infection before certain medical and dental procedures.

## 2024-02-05 NOTE — DISCHARGE NOTE PROVIDER - NSDCMRMEDTOKEN_GEN_ALL_CORE_FT
acetaminophen 325 mg oral tablet: 2 tab(s) orally every 6 hours As needed Mild Pain (1 - 3)  apixaban 5 mg oral tablet: 1 tab(s) orally 2 times a day  Colace 100 mg oral capsule: 1 cap(s) orally once a day  D3 25 mcg (1000 intl units) oral tablet: 1 tab(s) orally once a day  furosemide 20 mg oral tablet: 1 tab(s) orally once a day  metoprolol tartrate 50 mg oral tablet: 1 tab(s) orally 2 times a day  rosuvastatin 20 mg oral tablet: 1 tab(s) orally once a day  spironolactone 25 mg oral tablet: 1 tab(s) orally once a day   acetaminophen 325 mg oral tablet: 2 tab(s) orally every 6 hours As needed Mild Pain (1 - 3)  apixaban 5 mg oral tablet: 1 tab(s) orally 2 times a day  aspirin 81 mg oral delayed release tablet: 1 tab(s) orally once a day  Colace 100 mg oral capsule: 1 cap(s) orally once a day  cyclobenzaprine 5 mg oral tablet: 1 tab(s) orally 3 times a day as needed for Muscle Spasm  D3 25 mcg (1000 intl units) oral tablet: 1 tab(s) orally once a day  ferrous sulfate 325 mg (65 mg elemental iron) oral tablet: 1 tab(s) orally once a day  folic acid 1 mg oral tablet: 1 tab(s) orally once a day  furosemide 40 mg oral tablet: 1 tab(s) orally once a day  lidocaine 4% topical film: Apply topically to affected area every 24 hours use as directed apply to affected area, on for 12 hours, off for 12 hours  metoprolol tartrate 25 mg oral tablet: 1 tab(s) orally 2 times a day  potassium chloride 20 mEq/15 mL oral liquid: 15 milliliter(s) orally once a day take with furosemide  rosuvastatin 20 mg oral tablet: 1 tab(s) orally once a day  Ultram 50 mg oral tablet: 0.5 tab(s) orally every 4 hours as needed for as needed for moderate-severe pain MDD: 6 doses

## 2024-02-05 NOTE — DISCHARGE NOTE PROVIDER - HOSPITAL COURSE
68 year old female with a PMHx of HTN, HLD, Left Breast Cancer (s/p XRT and lumpectomy; 2009), pAF (on Eliquis), and Mitral valve stenosis s/p Mitral valve replacement (#29 Perimount Giovanna Jackson) with MAZE ablation (April 2013 with Dr. Zhang at [facility]), recent cardioversions for afib, with patient found to have Severe prosthetic mitral stenosis and Severe TR when admitted for acute on chronic diastolic CHF exacerbation, was preadmitted for a heparin gtt and underwent Re-op mitral valve replacement, tricuspid valve repair with Dr. Jimenez on 1/31/23. Postop course significant for ABLA (stable), and thrombocytopenia (stable). Patient remains hemodynamically stable and stable from a respiratory standpoint at this time. Plan for discharge home later today as per Dr. Jimenez.

## 2024-02-05 NOTE — DISCHARGE NOTE PROVIDER - NSDCCPTREATMENT_GEN_ALL_CORE_FT
PRINCIPAL PROCEDURE  Procedure: Replacement, mitral valve, with SARINA, adult  Findings and Treatment: 29mm Pizarro Mitral Valve      SECONDARY PROCEDURE  Procedure: Repair of tricuspid valve in adult  Findings and Treatment: 28mm Triad Ring    Procedure: Repeat sternotomy  Findings and Treatment:

## 2024-02-05 NOTE — DISCHARGE NOTE PROVIDER - DISCHARGE SERVICE FOR PATIENT
on the discharge service for the patient. I have reviewed and made amendments to the documentation where necessary. <-- Click to add NO significant Past Surgical History

## 2024-02-05 NOTE — PROGRESS NOTE ADULT - SUBJECTIVE AND OBJECTIVE BOX
POD #5 s/p Repeat sternotomy, Mitral Valve Replacement (29mm Pizarro), Repair of tricuspid valve (28mm Triad Ring)    PAST MEDICAL & SURGICAL HISTORY:  Afib  Breast cancer, left; s/p lumpectomy and XRT  Mitral valve insufficiency  Tricuspid valve insufficiency  HTN (hypertension)  HLD (hyperlipidemia)  S/P breast lumpectomy, left  H/O prosthetic mitral valve, bioprosthetic bovine 2013  History of cardiac cath  H/O right wrist surgery    FAMILY HISTORY:  FH: breast cancer (Mother, Sibling, Aunt)  FH: leukemia (Mother)    Brief Hospital Course: 68 year old female with a PMHx of HTN, HLD, Left Breast Cancer (s/p XRT and lumpectomy; 2009), pAF (on Eliquis), and Mitral valve stenosis s/p Mitral valve replacement (#29 Perimount Giovanna Jackson) with MAZE ablation (April 2013 with Dr. Zhang at [facility]), recent cardioversions for afib, with patient found to have Severe prosthetic mitral stenosis and Severe TR when admitted for acute on chronic diastolic CHF exacerbation, was preadmitted for a heparin gtt and underwent Re-op mitral valve replacement, tricuspid valve repair with Dr. Jimenez on 1/31/23. Postop course significant for ABLA (stable), and thrombocytopenia (stable).     Significant recent/past 24 hr events: No overnight events reported.    Subjective: Patient lying in bed in NAD. +Tolerating diet. +Passing gas. +Pain currently controlled. Denies fevers, chills, lightheadedness, dizziness, HA, CP, palpitations, SOB, cough, abdominal pain, N/V, diarrhea, numbness/tingling in extremities, or any other acute complaints. ROS negative x 10 systems except as noted above.    MEDICATIONS  (STANDING):  ascorbic acid 500 milliGRAM(s) Oral two times a day  aspirin enteric coated 325 milliGRAM(s) Oral daily  bisacodyl Suppository 10 milliGRAM(s) Rectal once  chlorhexidine 2% Cloths 1 Application(s) Topical daily  enoxaparin Injectable 40 milliGRAM(s) SubCutaneous every 24 hours  ferrous    sulfate 325 milliGRAM(s) Oral three times a day  folic acid 1 milliGRAM(s) Oral daily  furosemide    Tablet 40 milliGRAM(s) Oral daily  lidocaine   4% Patch 1 Patch Transdermal once  melatonin 5 milliGRAM(s) Oral at bedtime  metoprolol tartrate 25 milliGRAM(s) Oral two times a day  pantoprazole    Tablet 40 milliGRAM(s) Oral daily  polyethylene glycol 3350 17 Gram(s) Oral daily  senna 2 Tablet(s) Oral at bedtime    MEDICATIONS  (PRN):  acetaminophen     Tablet .. 650 milliGRAM(s) Oral every 6 hours PRN Mild Pain (1 - 3)  acetaminophen   IVPB .. 1000 milliGRAM(s) IV Intermittent once PRN Mild Pain (1 - 3)  bisacodyl Suppository 10 milliGRAM(s) Rectal daily PRN Constipation  ondansetron Injectable 4 milliGRAM(s) IV Push every 6 hours PRN Nausea and/or Vomiting  oxyCODONE    IR 10 milliGRAM(s) Oral every 4 hours PRN Severe Pain (7 - 10)  oxyCODONE    IR 5 milliGRAM(s) Oral every 4 hours PRN Moderate Pain (4 - 6)    Allergies: No Known Allergies    Vitals   T(C): 36.6 (05 Feb 2024 01:00), Max: 37.2 (04 Feb 2024 08:01)  T(F): 97.8 (05 Feb 2024 01:00), Max: 98.9 (04 Feb 2024 08:01)  HR: 73 (05 Feb 2024 01:00) (69 - 87)  BP: 103/60 (05 Feb 2024 01:00) (99/54 - 112/64)  RR: 18 (05 Feb 2024 01:00) (16 - 18)  SpO2: 95% (05 Feb 2024 01:00) (95% - 97%)  O2 Parameters below as of 05 Feb 2024 01:00  Patient On (Oxygen Delivery Method): room air    I&O's Detail    03 Feb 2024 07:01  -  04 Feb 2024 07:00  --------------------------------------------------------  IN:    Oral Fluid: 220 mL  Total IN: 220 mL    OUT:    Chest Tube (mL): 869 mL  Total OUT: 869 mL    Total NET: -649 mL      04 Feb 2024 07:01  -  05 Feb 2024 04:38  --------------------------------------------------------  IN:    Oral Fluid: 810 mL  Total IN: 810 mL    OUT:    Voided (mL): 600 mL  Total OUT: 600 mL    Total NET: 210 mL    Physical Exam  General: Lying in bed in NAD  Neuro: A+O x 3, non-focal, speech clear and intact  HEENT:  NCAT. No conjuctival edema or icterus, no thrush.    Neck:  Supple, trachea midline  Pulm: CTA bilaterally, no accessory muscle use noted  Chest: +PW (isolated)  CV: regular rate, regular rhythm, +S1S2, no murmur noted  Abd: soft, NT, ND, + BS  Ext: CHAPMAN x 4, trace edema, no cyanosis, distal motor/neuro/circ intact  Skin: warm, dry, perfused  Incisions: midsternal incision open to air C/D/I, sternum stable    LABS                        8.6    6.91  )-----------( 136      ( 04 Feb 2024 06:00 )             26.6     02-04    134<L>  |  98  |  22.7<H>  ----------------------------<  95  4.8   |  26.0  |  0.88    Ca    8.7      04 Feb 2024 06:00  Mg     1.9     02-04    Urinalysis Basic - ( 04 Feb 2024 06:00 )  Color: x / Appearance: x / SG: x / pH: x  Gluc: 95 mg/dL / Ketone: x  / Bili: x / Urobili: x   Blood: x / Protein: x / Nitrite: x   Leuk Esterase: x / RBC: x / WBC x   Sq Epi: x / Non Sq Epi: x / Bacteria: x    POCT Blood Glucose.: 131 mg/dL (02-04-24 @ 21:27)  POCT Blood Glucose.: 133 mg/dL (02-04-24 @ 17:18)  POCT Blood Glucose.: 103 mg/dL (02-04-24 @ 12:10)  POCT Blood Glucose.: 105 mg/dL (02-04-24 @ 08:19)      Last CXR:  < from: Xray Chest 1 View- PORTABLE-Urgent (Xray Chest 1 View- PORTABLE-Urgent .) (02.04.24 @ 10:58) >  IMPRESSION: Heart size, mediastinal and hilar contours are unchanged. Poststernotomy and aortic and mitral valve replacements. Persistent bibasilar effusions left greater than right with some improving left basilar linear atelectasis. There has been removal of mediastinal drain and right-sided chest tube. There is a small right apical pneumothorax unchanged.  < end of copied text >

## 2024-02-06 LAB
ALBUMIN SERPL ELPH-MCNC: 3.4 G/DL — SIGNIFICANT CHANGE UP (ref 3.3–5.2)
ALP SERPL-CCNC: 114 U/L — SIGNIFICANT CHANGE UP (ref 40–120)
ALT FLD-CCNC: 28 U/L — SIGNIFICANT CHANGE UP
ANION GAP SERPL CALC-SCNC: 12 MMOL/L — SIGNIFICANT CHANGE UP (ref 5–17)
ANION GAP SERPL CALC-SCNC: 14 MMOL/L — SIGNIFICANT CHANGE UP (ref 5–17)
AST SERPL-CCNC: 29 U/L — SIGNIFICANT CHANGE UP
BILIRUB SERPL-MCNC: 0.6 MG/DL — SIGNIFICANT CHANGE UP (ref 0.4–2)
BLD GP AB SCN SERPL QL: SIGNIFICANT CHANGE UP
BUN SERPL-MCNC: 26.1 MG/DL — HIGH (ref 8–20)
BUN SERPL-MCNC: 27.1 MG/DL — HIGH (ref 8–20)
CALCIUM SERPL-MCNC: 8.5 MG/DL — SIGNIFICANT CHANGE UP (ref 8.4–10.5)
CALCIUM SERPL-MCNC: 8.7 MG/DL — SIGNIFICANT CHANGE UP (ref 8.4–10.5)
CHLORIDE SERPL-SCNC: 93 MMOL/L — LOW (ref 96–108)
CHLORIDE SERPL-SCNC: 96 MMOL/L — SIGNIFICANT CHANGE UP (ref 96–108)
CO2 SERPL-SCNC: 26 MMOL/L — SIGNIFICANT CHANGE UP (ref 22–29)
CO2 SERPL-SCNC: 26 MMOL/L — SIGNIFICANT CHANGE UP (ref 22–29)
CREAT SERPL-MCNC: 0.92 MG/DL — SIGNIFICANT CHANGE UP (ref 0.5–1.3)
CREAT SERPL-MCNC: 0.92 MG/DL — SIGNIFICANT CHANGE UP (ref 0.5–1.3)
EGFR: 69 ML/MIN/1.73M2 — SIGNIFICANT CHANGE UP
EGFR: 69 ML/MIN/1.73M2 — SIGNIFICANT CHANGE UP
GLUCOSE SERPL-MCNC: 108 MG/DL — HIGH (ref 70–99)
GLUCOSE SERPL-MCNC: 108 MG/DL — HIGH (ref 70–99)
HCT VFR BLD CALC: 25.5 % — LOW (ref 34.5–45)
HCT VFR BLD CALC: 28.7 % — LOW (ref 34.5–45)
HGB BLD-MCNC: 8.5 G/DL — LOW (ref 11.5–15.5)
HGB BLD-MCNC: 9.9 G/DL — LOW (ref 11.5–15.5)
MAGNESIUM SERPL-MCNC: 1.8 MG/DL — SIGNIFICANT CHANGE UP (ref 1.6–2.6)
MAGNESIUM SERPL-MCNC: 2 MG/DL — SIGNIFICANT CHANGE UP (ref 1.6–2.6)
MCHC RBC-ENTMCNC: 30.9 PG — SIGNIFICANT CHANGE UP (ref 27–34)
MCHC RBC-ENTMCNC: 31.7 PG — SIGNIFICANT CHANGE UP (ref 27–34)
MCHC RBC-ENTMCNC: 33.3 GM/DL — SIGNIFICANT CHANGE UP (ref 32–36)
MCHC RBC-ENTMCNC: 34.5 GM/DL — SIGNIFICANT CHANGE UP (ref 32–36)
MCV RBC AUTO: 92 FL — SIGNIFICANT CHANGE UP (ref 80–100)
MCV RBC AUTO: 92.7 FL — SIGNIFICANT CHANGE UP (ref 80–100)
PLATELET # BLD AUTO: 186 K/UL — SIGNIFICANT CHANGE UP (ref 150–400)
PLATELET # BLD AUTO: 206 K/UL — SIGNIFICANT CHANGE UP (ref 150–400)
POTASSIUM SERPL-MCNC: 4.2 MMOL/L — SIGNIFICANT CHANGE UP (ref 3.5–5.3)
POTASSIUM SERPL-MCNC: 4.5 MMOL/L — SIGNIFICANT CHANGE UP (ref 3.5–5.3)
POTASSIUM SERPL-SCNC: 4.2 MMOL/L — SIGNIFICANT CHANGE UP (ref 3.5–5.3)
POTASSIUM SERPL-SCNC: 4.5 MMOL/L — SIGNIFICANT CHANGE UP (ref 3.5–5.3)
PROT SERPL-MCNC: 6 G/DL — LOW (ref 6.6–8.7)
RBC # BLD: 2.75 M/UL — LOW (ref 3.8–5.2)
RBC # BLD: 3.12 M/UL — LOW (ref 3.8–5.2)
RBC # FLD: 13.1 % — SIGNIFICANT CHANGE UP (ref 10.3–14.5)
RBC # FLD: 13.7 % — SIGNIFICANT CHANGE UP (ref 10.3–14.5)
SODIUM SERPL-SCNC: 133 MMOL/L — LOW (ref 135–145)
SODIUM SERPL-SCNC: 134 MMOL/L — LOW (ref 135–145)
WBC # BLD: 5.85 K/UL — SIGNIFICANT CHANGE UP (ref 3.8–10.5)
WBC # BLD: 7.52 K/UL — SIGNIFICANT CHANGE UP (ref 3.8–10.5)
WBC # FLD AUTO: 5.85 K/UL — SIGNIFICANT CHANGE UP (ref 3.8–10.5)
WBC # FLD AUTO: 7.52 K/UL — SIGNIFICANT CHANGE UP (ref 3.8–10.5)

## 2024-02-06 PROCEDURE — 99232 SBSQ HOSP IP/OBS MODERATE 35: CPT

## 2024-02-06 PROCEDURE — 71045 X-RAY EXAM CHEST 1 VIEW: CPT | Mod: 26

## 2024-02-06 RX ORDER — CYCLOBENZAPRINE HYDROCHLORIDE 10 MG/1
5 TABLET, FILM COATED ORAL THREE TIMES A DAY
Refills: 0 | Status: DISCONTINUED | OUTPATIENT
Start: 2024-02-06 | End: 2024-02-08

## 2024-02-06 RX ORDER — APIXABAN 2.5 MG/1
5 TABLET, FILM COATED ORAL EVERY 12 HOURS
Refills: 0 | Status: DISCONTINUED | OUTPATIENT
Start: 2024-02-07 | End: 2024-02-08

## 2024-02-06 RX ORDER — POTASSIUM CHLORIDE 20 MEQ
20 PACKET (EA) ORAL ONCE
Refills: 0 | Status: COMPLETED | OUTPATIENT
Start: 2024-02-06 | End: 2024-02-06

## 2024-02-06 RX ORDER — FUROSEMIDE 40 MG
20 TABLET ORAL ONCE
Refills: 0 | Status: COMPLETED | OUTPATIENT
Start: 2024-02-06 | End: 2024-02-06

## 2024-02-06 RX ORDER — MAGNESIUM SULFATE 500 MG/ML
2 VIAL (ML) INJECTION ONCE
Refills: 0 | Status: COMPLETED | OUTPATIENT
Start: 2024-02-06 | End: 2024-02-06

## 2024-02-06 RX ORDER — LIDOCAINE 4 G/100G
1 CREAM TOPICAL DAILY
Refills: 0 | Status: DISCONTINUED | OUTPATIENT
Start: 2024-02-06 | End: 2024-02-08

## 2024-02-06 RX ORDER — FERROUS SULFATE 325(65) MG
325 TABLET ORAL DAILY
Refills: 0 | Status: DISCONTINUED | OUTPATIENT
Start: 2024-02-06 | End: 2024-02-08

## 2024-02-06 RX ADMIN — Medication 1 MILLIGRAM(S): at 10:54

## 2024-02-06 RX ADMIN — Medication 40 MILLIGRAM(S): at 05:35

## 2024-02-06 RX ADMIN — Medication 25 MILLIGRAM(S): at 17:53

## 2024-02-06 RX ADMIN — POLYETHYLENE GLYCOL 3350 17 GRAM(S): 17 POWDER, FOR SOLUTION ORAL at 10:54

## 2024-02-06 RX ADMIN — CHLORHEXIDINE GLUCONATE 1 APPLICATION(S): 213 SOLUTION TOPICAL at 11:02

## 2024-02-06 RX ADMIN — Medication 5 MILLIGRAM(S): at 21:06

## 2024-02-06 RX ADMIN — Medication 25 GRAM(S): at 20:57

## 2024-02-06 RX ADMIN — Medication 25 MILLIGRAM(S): at 05:35

## 2024-02-06 RX ADMIN — Medication 325 MILLIGRAM(S): at 10:54

## 2024-02-06 RX ADMIN — TRAMADOL HYDROCHLORIDE 25 MILLIGRAM(S): 50 TABLET ORAL at 03:25

## 2024-02-06 RX ADMIN — PANTOPRAZOLE SODIUM 40 MILLIGRAM(S): 20 TABLET, DELAYED RELEASE ORAL at 10:55

## 2024-02-06 RX ADMIN — SENNA PLUS 2 TABLET(S): 8.6 TABLET ORAL at 21:06

## 2024-02-06 RX ADMIN — Medication 20 MILLIEQUIVALENT(S): at 20:57

## 2024-02-06 RX ADMIN — Medication 650 MILLIGRAM(S): at 06:02

## 2024-02-06 RX ADMIN — CYCLOBENZAPRINE HYDROCHLORIDE 5 MILLIGRAM(S): 10 TABLET, FILM COATED ORAL at 22:28

## 2024-02-06 RX ADMIN — Medication 20 MILLIGRAM(S): at 16:56

## 2024-02-06 RX ADMIN — Medication 325 MILLIGRAM(S): at 05:35

## 2024-02-06 RX ADMIN — Medication 650 MILLIGRAM(S): at 06:30

## 2024-02-06 NOTE — PROGRESS NOTE ADULT - SUBJECTIVE AND OBJECTIVE BOX
SUBJECTIVE   "i dont think i am ready to go home"   with complaints of pain, not controlled by medications - concerns of hallucinations on narcotics     INTERIM HISTORY SIGNIFICANT FOR   currently with pain control issues - started on flexeril   given 1 uprbc for acute blood loss anemia     Patient is a 68y old  Female who presents with a chief complaint of MVS , TR (03 Feb 2024 14:06)    HPI:  67 y/o female with PMH of HTN, HLD, left breast cancer (s/p XRT and lumpectomy; 2009), pAF (on Eliquis) and Mitral valve stenosis s/p Mitral valve replacement #29 Perimount Giovanna Jackson with MAZE ablation (April 2013 Dr Zhang [facility]) presents to [facility] with complaints of worsening shortness of breath. States in 06/2023 she started to notice worsening shortness of breath and STEWART which has gotten progressively worse since. She has also had 2 episodes of atrial fibrillation with cardioversions since 06/2023 with most recent cardioversion 01/2024. She was recently admitted to [facility] with complaints of 3 weeks of worsening SOB and STEWART, acute on chronic diastolic CHF exacerbation, with SARINA showing severe prosthetic mitral stenosis and severe TR, s/p LHC with no obstructive CAD noted. Denies chest pain, palpitations, dizziness, lightheadedness or near syncope. Patient is scheduled for mitral valve repair, possible replace, tricuspid valve replacement with Dr. Jimenez on 1/31/23, to be pre- admitted on 1/30/24.  (18 Jan 2024 09:29)    OBJECTIVE  PAST MEDICAL & SURGICAL HISTORY:  Afib      Breast cancer  left; s/p lumpectomy and XRT      Mitral valve insufficiency      Tricuspid valve insufficiency      HTN (hypertension)      HLD (hyperlipidemia)      S/P breast lumpectomy  left      H/O prosthetic mitral valve  bioprosthetic bovine 2013      History of cardiac cath      H/O right wrist surgery        No Known Allergies    Home Medications:  acetaminophen 325 mg oral tablet: 2 tab(s) orally every 6 hours As needed Mild Pain (1 - 3) (18 Jan 2024 10:02)  Colace 100 mg oral capsule: 1 cap(s) orally once a day (18 Jan 2024 10:02)  D3 25 mcg (1000 intl units) oral tablet: 1 tab(s) orally once a day (18 Jan 2024 10:02)  rosuvastatin 20 mg oral tablet: 1 tab(s) orally once a day (18 Jan 2024 10:02)    VITALS  ICU Vital Signs Last 24 Hrs  T(C): 36.8 (06 Feb 2024 14:15), Max: 37.3 (06 Feb 2024 13:53)  T(F): 98.2 (06 Feb 2024 14:15), Max: 99.2 (06 Feb 2024 13:53)  HR: 82 (06 Feb 2024 14:15) (56 - 89)  BP: 110/66 (06 Feb 2024 14:15) (101/61 - 131/69)  RR: 18 (06 Feb 2024 14:15) (18 - 18)  SpO2: 98% (06 Feb 2024 14:15) (95% - 98%)    O2 Parameters below as of 06 Feb 2024 14:15  Patient On (Oxygen Delivery Method): room air          LABS                        8.5    5.85  )-----------( 186      ( 06 Feb 2024 05:18 )             25.5   02-06    134<L>  |  96  |  26.1<H>  ----------------------------<  108<H>  4.5   |  26.0  |  0.92    Ca    8.7      06 Feb 2024 05:18  Mg     2.0     02-06    CAPILLARY BLOOD GLUCOSE      POCT Blood Glucose.: 131 mg/dL (04 Feb 2024 21:27)          IN/OUT    02-05-24 @ 07:01  -  02-06-24 @ 07:00  --------------------------------------------------------  IN: 820 mL / OUT: 0 mL / NET: 820 mL    02-06-24 @ 07:01  -  02-06-24 @ 15:11  --------------------------------------------------------  IN: 480 mL / OUT: 0 mL / NET: 480 mL      IMAGING  personally reviewed imaging     CURRENT MEDICATIONS  MEDICATIONS  (STANDING):  aspirin enteric coated 325 milliGRAM(s) Oral daily  bisacodyl Suppository 10 milliGRAM(s) Rectal once  chlorhexidine 2% Cloths 1 Application(s) Topical daily  ferrous    sulfate 325 milliGRAM(s) Oral daily  folic acid 1 milliGRAM(s) Oral daily  furosemide    Tablet 40 milliGRAM(s) Oral daily  furosemide   Injectable 20 milliGRAM(s) IV Push once  melatonin 5 milliGRAM(s) Oral at bedtime  metoprolol tartrate 25 milliGRAM(s) Oral two times a day  pantoprazole    Tablet 40 milliGRAM(s) Oral daily  polyethylene glycol 3350 17 Gram(s) Oral daily  senna 2 Tablet(s) Oral at bedtime    MEDICATIONS  (PRN):  acetaminophen     Tablet .. 650 milliGRAM(s) Oral every 6 hours PRN Mild Pain (1 - 3)  bisacodyl Suppository 10 milliGRAM(s) Rectal daily PRN Constipation  cyclobenzaprine 5 milliGRAM(s) Oral three times a day PRN Muscle Spasm  ondansetron Injectable 4 milliGRAM(s) IV Push every 6 hours PRN Nausea and/or Vomiting  traMADol 25 milliGRAM(s) Oral every 4 hours PRN pain >3

## 2024-02-07 ENCOUNTER — TRANSCRIPTION ENCOUNTER (OUTPATIENT)
Age: 69
End: 2024-02-07

## 2024-02-07 PROBLEM — I34.0 NONRHEUMATIC MITRAL (VALVE) INSUFFICIENCY: Chronic | Status: ACTIVE | Noted: 2024-01-18

## 2024-02-07 PROBLEM — I10 ESSENTIAL (PRIMARY) HYPERTENSION: Chronic | Status: ACTIVE | Noted: 2024-01-18

## 2024-02-07 PROBLEM — E78.5 HYPERLIPIDEMIA, UNSPECIFIED: Chronic | Status: ACTIVE | Noted: 2024-01-18

## 2024-02-07 PROBLEM — I07.1 RHEUMATIC TRICUSPID INSUFFICIENCY: Chronic | Status: ACTIVE | Noted: 2024-01-18

## 2024-02-07 LAB
ANION GAP SERPL CALC-SCNC: 9 MMOL/L — SIGNIFICANT CHANGE UP (ref 5–17)
BUN SERPL-MCNC: 20.1 MG/DL — HIGH (ref 8–20)
CALCIUM SERPL-MCNC: 8.6 MG/DL — SIGNIFICANT CHANGE UP (ref 8.4–10.5)
CHLORIDE SERPL-SCNC: 98 MMOL/L — SIGNIFICANT CHANGE UP (ref 96–108)
CO2 SERPL-SCNC: 27 MMOL/L — SIGNIFICANT CHANGE UP (ref 22–29)
CREAT SERPL-MCNC: 0.76 MG/DL — SIGNIFICANT CHANGE UP (ref 0.5–1.3)
EGFR: 86 ML/MIN/1.73M2 — SIGNIFICANT CHANGE UP
GLUCOSE SERPL-MCNC: 105 MG/DL — HIGH (ref 70–99)
HCT VFR BLD CALC: 27.1 % — LOW (ref 34.5–45)
HGB BLD-MCNC: 9.3 G/DL — LOW (ref 11.5–15.5)
MAGNESIUM SERPL-MCNC: 2.1 MG/DL — SIGNIFICANT CHANGE UP (ref 1.6–2.6)
MCHC RBC-ENTMCNC: 31.5 PG — SIGNIFICANT CHANGE UP (ref 27–34)
MCHC RBC-ENTMCNC: 34.3 GM/DL — SIGNIFICANT CHANGE UP (ref 32–36)
MCV RBC AUTO: 91.9 FL — SIGNIFICANT CHANGE UP (ref 80–100)
PLATELET # BLD AUTO: 221 K/UL — SIGNIFICANT CHANGE UP (ref 150–400)
POTASSIUM SERPL-MCNC: 4.1 MMOL/L — SIGNIFICANT CHANGE UP (ref 3.5–5.3)
POTASSIUM SERPL-SCNC: 4.1 MMOL/L — SIGNIFICANT CHANGE UP (ref 3.5–5.3)
RBC # BLD: 2.95 M/UL — LOW (ref 3.8–5.2)
RBC # FLD: 13.5 % — SIGNIFICANT CHANGE UP (ref 10.3–14.5)
SODIUM SERPL-SCNC: 134 MMOL/L — LOW (ref 135–145)
WBC # BLD: 6.66 K/UL — SIGNIFICANT CHANGE UP (ref 3.8–10.5)
WBC # FLD AUTO: 6.66 K/UL — SIGNIFICANT CHANGE UP (ref 3.8–10.5)

## 2024-02-07 PROCEDURE — 99024 POSTOP FOLLOW-UP VISIT: CPT

## 2024-02-07 PROCEDURE — 71045 X-RAY EXAM CHEST 1 VIEW: CPT | Mod: 26

## 2024-02-07 RX ADMIN — Medication 25 MILLIGRAM(S): at 05:14

## 2024-02-07 RX ADMIN — CYCLOBENZAPRINE HYDROCHLORIDE 5 MILLIGRAM(S): 10 TABLET, FILM COATED ORAL at 11:08

## 2024-02-07 RX ADMIN — POLYETHYLENE GLYCOL 3350 17 GRAM(S): 17 POWDER, FOR SOLUTION ORAL at 17:07

## 2024-02-07 RX ADMIN — PANTOPRAZOLE SODIUM 40 MILLIGRAM(S): 20 TABLET, DELAYED RELEASE ORAL at 17:08

## 2024-02-07 RX ADMIN — Medication 650 MILLIGRAM(S): at 11:11

## 2024-02-07 RX ADMIN — TRAMADOL HYDROCHLORIDE 25 MILLIGRAM(S): 50 TABLET ORAL at 21:40

## 2024-02-07 RX ADMIN — LIDOCAINE 1 PATCH: 4 CREAM TOPICAL at 00:17

## 2024-02-07 RX ADMIN — Medication 325 MILLIGRAM(S): at 17:07

## 2024-02-07 RX ADMIN — Medication 650 MILLIGRAM(S): at 17:14

## 2024-02-07 RX ADMIN — Medication 325 MILLIGRAM(S): at 17:08

## 2024-02-07 RX ADMIN — Medication 5 MILLIGRAM(S): at 21:36

## 2024-02-07 RX ADMIN — SENNA PLUS 2 TABLET(S): 8.6 TABLET ORAL at 21:36

## 2024-02-07 RX ADMIN — Medication 650 MILLIGRAM(S): at 10:11

## 2024-02-07 RX ADMIN — TRAMADOL HYDROCHLORIDE 25 MILLIGRAM(S): 50 TABLET ORAL at 22:20

## 2024-02-07 RX ADMIN — CYCLOBENZAPRINE HYDROCHLORIDE 5 MILLIGRAM(S): 10 TABLET, FILM COATED ORAL at 23:46

## 2024-02-07 RX ADMIN — APIXABAN 5 MILLIGRAM(S): 2.5 TABLET, FILM COATED ORAL at 21:36

## 2024-02-07 RX ADMIN — TRAMADOL HYDROCHLORIDE 25 MILLIGRAM(S): 50 TABLET ORAL at 05:14

## 2024-02-07 RX ADMIN — CHLORHEXIDINE GLUCONATE 1 APPLICATION(S): 213 SOLUTION TOPICAL at 17:18

## 2024-02-07 RX ADMIN — Medication 40 MILLIGRAM(S): at 05:13

## 2024-02-07 RX ADMIN — Medication 1 MILLIGRAM(S): at 17:08

## 2024-02-07 NOTE — DISCHARGE NOTE NURSING/CASE MANAGEMENT/SOCIAL WORK - PATIENT PORTAL LINK FT
You can access the FollowMyHealth Patient Portal offered by [facility] by registering at the following website: [email or web address]. By joining [facility]’s FollowMyHealth portal, you will also be able to view your health information using other applications (apps) compatible with our system.

## 2024-02-07 NOTE — DISCHARGE NOTE NURSING/CASE MANAGEMENT/SOCIAL WORK - NSDCFUADDAPPT_GEN_ALL_CORE_FT
Please follow up with Dr. Jimenez on ___________.  The cardiac surgery office is located at [facility], first floor. Take a left at the end of the lobby until the end of that pepe (past the elevator bank). Make a left and the office is on your right across from the elevators.    Your Care Navigator Nurse Practitioner will be in touch to see you in your home within a few days from discharge. The Follow Your Heart program can help ensure you understand your medications, discharge instructions and answer any questions you may have at that time. They are also a great source to address concerns during the day and may be reached at [phone].    Please follow up with your Cardiologist and Primary Care Physician 2-4 weeks from discharge.

## 2024-02-07 NOTE — DISCHARGE NOTE NURSING/CASE MANAGEMENT/SOCIAL WORK - NURSING SECTION COMPLETE
Patient seen and exam by Peds Psychologist at this time. Clear for D/C as per MD. Slept thru the night , no behavioral issues noted Refused telepsychiatry , will be evaluated in the morning Patient/Caregiver provided printed discharge information.

## 2024-02-07 NOTE — DISCHARGE NOTE NURSING/CASE MANAGEMENT/SOCIAL WORK - NSDCPEFALRISK_GEN_ALL_CORE
For information on Fall & Injury Prevention, visit: [email or web address] OR  [email or web address] OR  https://www.cdc.gov/steadi/patient.html

## 2024-02-07 NOTE — PROGRESS NOTE ADULT - SUBJECTIVE AND OBJECTIVE BOX
Brief summary:  68F POD #7 s/p Repeat sternotomy, Mitral Valve Replacement (29mm Pizarro), Repair of tricuspid valve (28mm Triad Ring).    Subjective:   Patient seen and examined at the bedside. Patient out of bed, sitting in the chair. +Tolerating diet. +Passing BMs since surgery. Patient reports "I am just still having some pain, I am nervous to go home, the pain is getting better but still need pain medicine.". Denies fevers, chills, lightheadedness, dizziness, HA, palpitations, SOB, cough, abdominal pain, N/V, diarrhea, numbness/tingling in extremities, or any other acute complaints. ROS negative x 10 systems except as noted above       PAST MEDICAL & SURGICAL HISTORY:  Afib    Breast cancer  left; s/p lumpectomy and XRT    Mitral valve insufficiency    Tricuspid valve insufficiency    HTN (hypertension)    HLD (hyperlipidemia)    S/P breast lumpectomy  left    H/O prosthetic mitral valve  bioprosthetic bovine 2013    History of cardiac cath    H/O right wrist surgery    Medications:  acetaminophen     Tablet .. 650 milliGRAM(s) Oral every 6 hours PRN  apixaban 5 milliGRAM(s) Oral every 12 hours  aspirin enteric coated 325 milliGRAM(s) Oral daily  bisacodyl Suppository 10 milliGRAM(s) Rectal daily PRN  bisacodyl Suppository 10 milliGRAM(s) Rectal once  chlorhexidine 2% Cloths 1 Application(s) Topical daily  cyclobenzaprine 5 milliGRAM(s) Oral three times a day PRN  ferrous    sulfate 325 milliGRAM(s) Oral daily  folic acid 1 milliGRAM(s) Oral daily  furosemide    Tablet 40 milliGRAM(s) Oral daily  lidocaine   4% Patch 1 Patch Transdermal daily  melatonin 5 milliGRAM(s) Oral at bedtime  metoprolol tartrate 25 milliGRAM(s) Oral two times a day  ondansetron Injectable 4 milliGRAM(s) IV Push every 6 hours PRN  pantoprazole    Tablet 40 milliGRAM(s) Oral daily  polyethylene glycol 3350 17 Gram(s) Oral daily  senna 2 Tablet(s) Oral at bedtime  traMADol 25 milliGRAM(s) Oral every 4 hours PRN      MEDICATIONS  (PRN):  acetaminophen     Tablet .. 650 milliGRAM(s) Oral every 6 hours PRN Mild Pain (1 - 3)  bisacodyl Suppository 10 milliGRAM(s) Rectal daily PRN Constipation  cyclobenzaprine 5 milliGRAM(s) Oral three times a day PRN Muscle Spasm  ondansetron Injectable 4 milliGRAM(s) IV Push every 6 hours PRN Nausea and/or Vomiting  traMADol 25 milliGRAM(s) Oral every 4 hours PRN pain >3      Daily Review:                        9.3    6.66  )-----------( 221      ( 07 Feb 2024 05:18 )             27.1   02-07    134<L>  |  98  |  20.1<H>  ----------------------------<  105<H>  4.1   |  27.0  |  0.76    Ca    8.6      07 Feb 2024 05:18  Mg     2.1     02-07    TPro  6.0<L>  /  Alb  3.4  /  TBili  0.6  /  DBili  x   /  AST  29  /  ALT  28  /  AlkPhos  114  02-06      T(C): 36.6 (02-07-24 @ 13:15), Max: 37.2 (02-07-24 @ 05:11)  HR: 75 (02-07-24 @ 13:15) (74 - 87)  BP: 126/66 (02-07-24 @ 13:15) (112/68 - 126/66)  RR: 17 (02-07-24 @ 13:15) (17 - 18)  SpO2: 96% (02-07-24 @ 13:15) (91% - 97%)  Wt(kg): --      I&O's Summary    06 Feb 2024 07:01  -  07 Feb 2024 07:00  --------------------------------------------------------  IN: 960 mL / OUT: 1350 mL / NET: -390 mL        Physical Exam  Neuro: A+O x 3, non-focal, speech clear and intact  Pulm: CTA b/l, no wheezing or rales  CV: RRR, +S1S2  Abd: soft, NT, ND, + bowel sounds  Ext: +DP Pulses b/l, +PT pulses, trace LE edema b/l  Inc: Mediastinal sternal incision C/D/I w/ staples, open to air

## 2024-02-08 VITALS
TEMPERATURE: 98 F | RESPIRATION RATE: 18 BRPM | DIASTOLIC BLOOD PRESSURE: 67 MMHG | OXYGEN SATURATION: 98 % | SYSTOLIC BLOOD PRESSURE: 117 MMHG | HEART RATE: 87 BPM

## 2024-02-08 LAB
ANION GAP SERPL CALC-SCNC: 12 MMOL/L — SIGNIFICANT CHANGE UP (ref 5–17)
BUN SERPL-MCNC: 21.3 MG/DL — HIGH (ref 8–20)
CALCIUM SERPL-MCNC: 8.5 MG/DL — SIGNIFICANT CHANGE UP (ref 8.4–10.5)
CHLORIDE SERPL-SCNC: 99 MMOL/L — SIGNIFICANT CHANGE UP (ref 96–108)
CO2 SERPL-SCNC: 26 MMOL/L — SIGNIFICANT CHANGE UP (ref 22–29)
CREAT SERPL-MCNC: 0.77 MG/DL — SIGNIFICANT CHANGE UP (ref 0.5–1.3)
EGFR: 85 ML/MIN/1.73M2 — SIGNIFICANT CHANGE UP
GLUCOSE SERPL-MCNC: 95 MG/DL — SIGNIFICANT CHANGE UP (ref 70–99)
HCT VFR BLD CALC: 28.3 % — LOW (ref 34.5–45)
HGB BLD-MCNC: 9.5 G/DL — LOW (ref 11.5–15.5)
MAGNESIUM SERPL-MCNC: 1.8 MG/DL — SIGNIFICANT CHANGE UP (ref 1.8–2.6)
MCHC RBC-ENTMCNC: 31.4 PG — SIGNIFICANT CHANGE UP (ref 27–34)
MCHC RBC-ENTMCNC: 33.6 GM/DL — SIGNIFICANT CHANGE UP (ref 32–36)
MCV RBC AUTO: 93.4 FL — SIGNIFICANT CHANGE UP (ref 80–100)
PLATELET # BLD AUTO: 268 K/UL — SIGNIFICANT CHANGE UP (ref 150–400)
POTASSIUM SERPL-MCNC: 4.3 MMOL/L — SIGNIFICANT CHANGE UP (ref 3.5–5.3)
POTASSIUM SERPL-SCNC: 4.3 MMOL/L — SIGNIFICANT CHANGE UP (ref 3.5–5.3)
RBC # BLD: 3.03 M/UL — LOW (ref 3.8–5.2)
RBC # FLD: 13.4 % — SIGNIFICANT CHANGE UP (ref 10.3–14.5)
SODIUM SERPL-SCNC: 137 MMOL/L — SIGNIFICANT CHANGE UP (ref 135–145)
WBC # BLD: 7.29 K/UL — SIGNIFICANT CHANGE UP (ref 3.8–10.5)
WBC # FLD AUTO: 7.29 K/UL — SIGNIFICANT CHANGE UP (ref 3.8–10.5)

## 2024-02-08 PROCEDURE — 83036 HEMOGLOBIN GLYCOSYLATED A1C: CPT

## 2024-02-08 PROCEDURE — 86965 POOLING BLOOD PLATELETS: CPT

## 2024-02-08 PROCEDURE — 85014 HEMATOCRIT: CPT

## 2024-02-08 PROCEDURE — 82803 BLOOD GASES ANY COMBINATION: CPT

## 2024-02-08 PROCEDURE — 93306 TTE W/DOPPLER COMPLETE: CPT

## 2024-02-08 PROCEDURE — C1889: CPT

## 2024-02-08 PROCEDURE — 85027 COMPLETE CBC AUTOMATED: CPT

## 2024-02-08 PROCEDURE — 93005 ELECTROCARDIOGRAM TRACING: CPT

## 2024-02-08 PROCEDURE — 86850 RBC ANTIBODY SCREEN: CPT

## 2024-02-08 PROCEDURE — 84443 ASSAY THYROID STIM HORMONE: CPT

## 2024-02-08 PROCEDURE — 86923 COMPATIBILITY TEST ELECTRIC: CPT

## 2024-02-08 PROCEDURE — 82550 ASSAY OF CK (CPK): CPT

## 2024-02-08 PROCEDURE — 80048 BASIC METABOLIC PNL TOTAL CA: CPT

## 2024-02-08 PROCEDURE — 83880 ASSAY OF NATRIURETIC PEPTIDE: CPT

## 2024-02-08 PROCEDURE — C1769: CPT

## 2024-02-08 PROCEDURE — 83605 ASSAY OF LACTIC ACID: CPT

## 2024-02-08 PROCEDURE — C1751: CPT

## 2024-02-08 PROCEDURE — 36430 TRANSFUSION BLD/BLD COMPNT: CPT

## 2024-02-08 PROCEDURE — 82553 CREATINE MB FRACTION: CPT

## 2024-02-08 PROCEDURE — 85576 BLOOD PLATELET AGGREGATION: CPT

## 2024-02-08 PROCEDURE — 86901 BLOOD TYPING SEROLOGIC RH(D): CPT

## 2024-02-08 PROCEDURE — 84295 ASSAY OF SERUM SODIUM: CPT

## 2024-02-08 PROCEDURE — P9045: CPT

## 2024-02-08 PROCEDURE — 82435 ASSAY OF BLOOD CHLORIDE: CPT

## 2024-02-08 PROCEDURE — 84484 ASSAY OF TROPONIN QUANT: CPT

## 2024-02-08 PROCEDURE — 71045 X-RAY EXAM CHEST 1 VIEW: CPT | Mod: 26

## 2024-02-08 PROCEDURE — 85018 HEMOGLOBIN: CPT

## 2024-02-08 PROCEDURE — 85025 COMPLETE CBC W/AUTO DIFF WBC: CPT

## 2024-02-08 PROCEDURE — 84100 ASSAY OF PHOSPHORUS: CPT

## 2024-02-08 PROCEDURE — C1894: CPT

## 2024-02-08 PROCEDURE — 81003 URINALYSIS AUTO W/O SCOPE: CPT

## 2024-02-08 PROCEDURE — 84132 ASSAY OF SERUM POTASSIUM: CPT

## 2024-02-08 PROCEDURE — 86891 AUTOLOGOUS BLOOD OP SALVAGE: CPT

## 2024-02-08 PROCEDURE — 84439 ASSAY OF FREE THYROXINE: CPT

## 2024-02-08 PROCEDURE — 85730 THROMBOPLASTIN TIME PARTIAL: CPT

## 2024-02-08 PROCEDURE — 85610 PROTHROMBIN TIME: CPT

## 2024-02-08 PROCEDURE — 94760 N-INVAS EAR/PLS OXIMETRY 1: CPT

## 2024-02-08 PROCEDURE — P9037: CPT

## 2024-02-08 PROCEDURE — 82962 GLUCOSE BLOOD TEST: CPT

## 2024-02-08 PROCEDURE — C1729: CPT

## 2024-02-08 PROCEDURE — 36415 COLL VENOUS BLD VENIPUNCTURE: CPT

## 2024-02-08 PROCEDURE — 94002 VENT MGMT INPAT INIT DAY: CPT

## 2024-02-08 PROCEDURE — P9100: CPT

## 2024-02-08 PROCEDURE — 82947 ASSAY GLUCOSE BLOOD QUANT: CPT

## 2024-02-08 PROCEDURE — 88305 TISSUE EXAM BY PATHOLOGIST: CPT

## 2024-02-08 PROCEDURE — P9012: CPT

## 2024-02-08 PROCEDURE — 82330 ASSAY OF CALCIUM: CPT

## 2024-02-08 PROCEDURE — 86900 BLOOD TYPING SEROLOGIC ABO: CPT

## 2024-02-08 PROCEDURE — 83735 ASSAY OF MAGNESIUM: CPT

## 2024-02-08 PROCEDURE — 80053 COMPREHEN METABOLIC PANEL: CPT

## 2024-02-08 PROCEDURE — 99024 POSTOP FOLLOW-UP VISIT: CPT

## 2024-02-08 PROCEDURE — P9016: CPT

## 2024-02-08 PROCEDURE — 71045 X-RAY EXAM CHEST 1 VIEW: CPT

## 2024-02-08 RX ORDER — METOPROLOL TARTRATE 50 MG
1 TABLET ORAL
Qty: 60 | Refills: 1
Start: 2024-02-08 | End: 2024-04-07

## 2024-02-08 RX ORDER — POTASSIUM CHLORIDE 20 MEQ
15 PACKET (EA) ORAL
Qty: 105 | Refills: 0
Start: 2024-02-08 | End: 2024-02-14

## 2024-02-08 RX ORDER — MAGNESIUM SULFATE 500 MG/ML
2 VIAL (ML) INJECTION ONCE
Refills: 0 | Status: COMPLETED | OUTPATIENT
Start: 2024-02-08 | End: 2024-02-08

## 2024-02-08 RX ORDER — LIDOCAINE 4 G/100G
1 CREAM TOPICAL
Qty: 5 | Refills: 0
Start: 2024-02-08 | End: 2024-02-12

## 2024-02-08 RX ORDER — ASPIRIN/CALCIUM CARB/MAGNESIUM 324 MG
81 TABLET ORAL DAILY
Refills: 0 | Status: DISCONTINUED | OUTPATIENT
Start: 2024-02-08 | End: 2024-02-08

## 2024-02-08 RX ORDER — ASPIRIN/CALCIUM CARB/MAGNESIUM 324 MG
1 TABLET ORAL
Qty: 30 | Refills: 1
Start: 2024-02-08 | End: 2024-04-07

## 2024-02-08 RX ORDER — FOLIC ACID 0.8 MG
1 TABLET ORAL
Qty: 30 | Refills: 0
Start: 2024-02-08 | End: 2024-03-08

## 2024-02-08 RX ORDER — CYCLOBENZAPRINE HYDROCHLORIDE 10 MG/1
1 TABLET, FILM COATED ORAL
Qty: 21 | Refills: 0
Start: 2024-02-08 | End: 2024-02-14

## 2024-02-08 RX ORDER — FERROUS SULFATE 325(65) MG
1 TABLET ORAL
Qty: 30 | Refills: 0
Start: 2024-02-08 | End: 2024-03-08

## 2024-02-08 RX ORDER — FUROSEMIDE 40 MG
1 TABLET ORAL
Qty: 7 | Refills: 0
Start: 2024-02-08 | End: 2024-02-14

## 2024-02-08 RX ORDER — TRAMADOL HYDROCHLORIDE 50 MG/1
0.5 TABLET ORAL
Qty: 15 | Refills: 0
Start: 2024-02-08 | End: 2024-02-12

## 2024-02-08 RX ADMIN — Medication 81 MILLIGRAM(S): at 08:28

## 2024-02-08 RX ADMIN — POLYETHYLENE GLYCOL 3350 17 GRAM(S): 17 POWDER, FOR SOLUTION ORAL at 08:27

## 2024-02-08 RX ADMIN — Medication 40 MILLIGRAM(S): at 05:30

## 2024-02-08 RX ADMIN — Medication 650 MILLIGRAM(S): at 05:31

## 2024-02-08 RX ADMIN — Medication 25 MILLIGRAM(S): at 05:30

## 2024-02-08 RX ADMIN — APIXABAN 5 MILLIGRAM(S): 2.5 TABLET, FILM COATED ORAL at 08:28

## 2024-02-08 RX ADMIN — Medication 650 MILLIGRAM(S): at 06:04

## 2024-02-08 RX ADMIN — PANTOPRAZOLE SODIUM 40 MILLIGRAM(S): 20 TABLET, DELAYED RELEASE ORAL at 08:28

## 2024-02-08 RX ADMIN — Medication 1 MILLIGRAM(S): at 08:28

## 2024-02-08 RX ADMIN — Medication 25 GRAM(S): at 08:27

## 2024-02-08 RX ADMIN — CHLORHEXIDINE GLUCONATE 1 APPLICATION(S): 213 SOLUTION TOPICAL at 08:28

## 2024-02-08 RX ADMIN — CYCLOBENZAPRINE HYDROCHLORIDE 5 MILLIGRAM(S): 10 TABLET, FILM COATED ORAL at 10:30

## 2024-02-08 RX ADMIN — Medication 325 MILLIGRAM(S): at 08:28

## 2024-02-08 NOTE — PROGRESS NOTE ADULT - PROBLEM SELECTOR PROBLEM 2
H/O mitral valve replacement

## 2024-02-08 NOTE — PROGRESS NOTE ADULT - PROBLEM SELECTOR PLAN 3
same as above
Plan as above.
Patient is scheduled for mitral valve repair, possible replace, tricuspid valve replacement with Dr. Jimenez on 1/31/23,
Plan as above.
Plan as above.
same as above
Plan as above.
Plan as above.
same as above

## 2024-02-08 NOTE — PROGRESS NOTE ADULT - PROBLEM SELECTOR PLAN 1
S/p Re-op sternotomy / Re-do MVR(T), TV repair 1/31/24 with Dr. Jimenez.  Neurologically intact. Hemodynamically stable.  Postop bradycardia resolved. Continue lopressor as tolerated by HR and BP.   Continue aspirin for valve prophylaxis.  Continue Vitamin C per ERP for atrial fibrillation prophylaxis.  Oxygenating well, coughing and deep breathing exercises/incentive spirometry encouraged.   Follow up AM CXR.   Continue diuresis with Lasix 40mg PO QD, monitor strict I/Os, replenish electrolytes PRN to keep K>4 and Mg>2.   Continue with PT, increase activity as tolerated.  Tylenol PRN for analgesia.- issues with hallucination on narcotics, started on flexeril for adjunctive pain control & Toradol. Pt tolerating pain better.   Continue bowel regimen PRN constipation.   Case and plan to be reviewed / discussed with CT Surgery attending / team during AM rounds.
s/p RE-op sternotomy / Re-do MVR(T) TV repair 1/31 with Dr. Jimenez  Wean pressor as tolerated  Beta blocker and Amio ERAS on hold 2/2 to bradycardia   Lipitor for chronic graft patency prophylaxis  Aspirin for acute graft closure prophylaxis  Encourage PO intake  Encourage OOB to chair and ambulation with PT  Encourage deep breathing exercised and coughing  Chest PT and IS use with bedside nurse  pain management and bowel regimen  strict glycemic control
S/p Re-op sternotomy / Re-do MVR(T), TV repair 1/31/24 with Dr. Jimenez.  Neurologically intact. Hemodynamically stable.  Postop bradycardia resolved. Continue lopressor as tolerated by HR and BP.   Continue aspirin for valve prophylaxis.  Continue Vitamin C per ERP for atrial fibrillation prophylaxis.  Oxygenating well, coughing and deep breathing exercises/incentive spirometry encouraged.   Follow up AM CXR.   Continue diuresis with Lasix 40mg PO QD, monitor strict I/Os, replenish electrolytes PRN to keep K>4 and Mg>2.   Continue with PT, increase activity as tolerated.  Tylenol PRN for analgesia.- issues with hallucination on narcotics, started on flexeril for adjunctive pain control   Continue bowel regimen PRN constipation.   Case and plan to be reviewed / discussed with CT Surgery attending / team during AM rounds.
Patient is scheduled for mitral valve repair, possible replace, tricuspid valve replacement with Dr. Jimenez on 1/31/23, Eliquis on hold for procedure  Will start heparin drip
S/p Re-op sternotomy / Re-do MVR(T), TV repair 1/31/24 with Dr. Jimenez.  Neurologically intact. Hemodynamically stable.  Postop bradycardia resolved. Continue lopressor as tolerated by HR and BP.   Continue aspirin for valve prophylaxis.  Completed Amiodarone per  mg BID x 3 days postop.  Continue Vitamin C per ERP for atrial fibrillation prophylaxis.  Oxygenating well, wean FiO2 as tolerated, coughing and deep breathing exercises/incentive spirometry encouraged.   Follow up AM CXR.   D/c Right pigtail this morning if output remains minimal.  Continue diuresis with Lasix 40mg PO QD, monitor strict I/Os, replenish electrolytes PRN to keep K>4 and Mg>2.   Continue with PT, increase activity as tolerated.  FS AC+HS with coverage for glycemic control.  Tylenol, Oxy PRN for analgesia.  Continue bowel regimen PRN constipation.   Case and plan to be reviewed / discussed with CT Surgery attending / team during AM rounds.
s/p RE-op sternotomy / Re-do MVR(T) TV repair 1/31 with Dr. Jimenez  Beta blocker and Amio ERAS on hold 2/2 to bradycardia   Aspirin for valve prophylaxis  Encourage PO intake  Encourage OOB to chair and ambulation with PT  Encourage deep breathing exercised and coughing  Chest PT and IS use with bedside nurse  pain management and bowel regimen  strict glycemic control
S/p Re-op sternotomy / Re-do MVR(T), TV repair 1/31/24 with Dr. Jimenez.  Neurologically intact. Hemodynamically stable.  Postop bradycardia resolved. Continue lopressor as tolerated by HR and BP.   Continue aspirin for valve prophylaxis.  Continue Vitamin C per ERP for atrial fibrillation prophylaxis.  Oxygenating well, coughing and deep breathing exercises/incentive spirometry encouraged.   Follow up AM CXR.   Continue diuresis with Lasix 40mg PO QD, monitor strict I/Os, replenish electrolytes PRN to keep K>4 and Mg>2.   Continue with PT, increase activity as tolerated.  Tylenol PRN for analgesia.  Continue bowel regimen PRN constipation.   Case and plan to be reviewed / discussed with CT Surgery attending / team during AM rounds.
S/p Re-op sternotomy / Re-do MVR(T), TV repair 1/31/24 with Dr. Jimenez.  Neurologically intact. Hemodynamically stable.  Postop bradycardia resolved. Continue lopressor as tolerated by HR and BP.   Continue aspirin for valve prophylaxis.  Oxygenating well, coughing and deep breathing exercises/incentive spirometry encouraged.   Follow up AM CXR.   Continue diuresis with Lasix 40mg PO QD, monitor strict I/Os, replenish electrolytes PRN to keep K>4 and Mg>2.   Continue with PT, increase activity as tolerated.  Tylenol PRN for analgesia.- issues with hallucination on narcotics, started on flexeril for adjunctive pain control & Toradol. Pt tolerating pain better.   Continue bowel regimen PRN constipation.   Discharge home later today  Case and plan to be reviewed / discussed with CT Surgery attending / team during AM rounds.
s/p RE-op sternotomy / Re-do MVR(T) TV repair 1/31 with Dr. Jimenez  Postop bradycardia resolved - now rate controlled AD  Start Beta blocker as tolerated by HR and SBP  Aspirin for acute graft closure prophylaxis  Vitamin C per ERP for AF ppx  Encourage PO intake  Encourage OOB to chair and ambulation with PT  Encourage deep breathing exercised and coughing  Chest PT and IS use with bedside nurse

## 2024-02-08 NOTE — PROGRESS NOTE ADULT - PROBLEM SELECTOR PLAN 4
Caprini Score 7, at high risk, surgical team to order appropriate VTE prophylaxis  Ordering heparin drip
Continue lopressor as tolerated by HR and BP.
Continue lopressor as tolerated by HR and BP.
Start Lopressor, titrate for goal BP and HR
Continue lopressor as tolerated by HR and BP.
BB held 2/2 to bradycardia   may need norvasc if htn
BB held 2/2 to bradycardia   may need norvasc if htn

## 2024-02-08 NOTE — PROGRESS NOTE ADULT - ASSESSMENT
69 y/o female with PMH of HTN, HLD, left breast cancer (s/p XRT and lumpectomy; 2009), pAF (on Eliquis) and Mitral valve stenosis s/p Mitral valve replacement #29 Perimount Giovanna Jackson with MAZE ablation (April 2013 Dr Zhang [facility]) presents to PST with complaints of worsening shortness of breath. States in 06/2023 she started to notice worsening shortness of breath and STEWART which has gotten progressively worse since. She has also had 2 episodes of atrial fibrillation with cardioversions since 06/2023 with most recent cardioversion 01/2024. She was recently admitted to [facility] with complaints of 3 weeks of worsening SOB and STEWART, acute on chronic diastolic CHF exacerbation, with SARINA showing severe prosthetic mitral stenosis and severe TR, s/p LHC with no obstructive CAD noted. Denies chest pain, palpitations, dizziness, lightheadedness or near syncope. Patient is scheduled for mitral valve repair, possible replace, tricuspid valve replacement with Dr. Jimenez on 1/31/23.  Admitted today for heparin drip prior to surgery.    
68 year old female with a PMHx of HTN, HLD, Left Breast Cancer (s/p XRT and lumpectomy; 2009), pAF (on Eliquis), and Mitral valve stenosis s/p Mitral valve replacement (#29 Perimount Giovanna Jackson) with MAZE ablation (April 2013 with Dr. Zhang at [facility]), recent cardioversions for afib, with patient found to have Severe prosthetic mitral stenosis and Severe TR when admitted for acute on chronic diastolic CHF exacerbation, was preadmitted for a heparin gtt and underwent Re-op mitral valve replacement, tricuspid valve repair with Dr. Jimenez on 1/31/23. Postop course significant for ABLA (stable), and thrombocytopenia (stable). 
69 y/o female with PMH of HTN, HLD, left breast cancer (s/p XRT and lumpectomy; 2009), pAF (on Eliquis) and Mitral valve stenosis s/p Mitral valve replacement #29 Perimount Giovanna Jackson with MAZE ablation (April 2013 Dr Zhang [facility]) presents to PST with complaints of worsening shortness of breath. States in 06/2023 she started to notice worsening shortness of breath and STEWART which has gotten progressively worse since. She has also had 2 episodes of atrial fibrillation with cardioversions since 06/2023 with most recent cardioversion 01/2024. She was recently admitted to [facility] with complaints of 3 weeks of worsening SOB and STEWART, acute on chronic diastolic CHF exacerbation, with SARINA showing severe prosthetic mitral stenosis and severe TR, s/p LHC with no obstructive CAD noted.  Patient is scheduled for mitral valve repair, possible replace, tricuspid valve replacement with Dr. Jimenez on 1/31/23.  Admitted1/30 for heparin drip prior to surgery.  Pt now s/p RE-op sternotomy / Re-do MVR(T) TV repair 1/31 with Dr. Jimenez   
68 year old female with a PMHx of HTN, HLD, Left Breast Cancer (s/p XRT and lumpectomy; 2009), pAF (on Eliquis), and Mitral valve stenosis s/p Mitral valve replacement (#29 Perimount Giovanna Jackson) with MAZE ablation (April 2013 with Dr. Zahng at [facility]), recent cardioversions for afib, with patient found to have Severe prosthetic mitral stenosis and Severe TR when admitted for acute on chronic diastolic CHF exacerbation, was preadmitted for a heparin gtt and underwent Re-op mitral valve replacement, tricuspid valve repair with Dr. Jimenez on 1/31/23. Postop course significant for ABLA (stable), and thrombocytopenia (stable). 
68F PMH of HTN, HLD, left breast cancer (s/p XRT and lumpectomy; 2009), pAF (on Eliquis) and Mitral valve stenosis s/p Mitral valve replacement #29 Perimount Giovanna Jackson with MAZE ablation (April 2013 Dr Zhang [facility]), AF with recent cardioversions, was found to have severe prosthetic mitral stenosis and severe TR when admitted for acute on chronic diastolic CHF exacerbation, was preadmit for heparin gtt and underwent re-op mitral valve replacement, tricuspid valve repair with Dr. Jimenez on 1/31/23. Postop course significant for ABLA.   
69 y/o female with PMH of HTN, HLD, left breast cancer (s/p XRT and lumpectomy; 2009), pAF (on Eliquis) and Mitral valve stenosis s/p Mitral valve replacement #29 Perimount Giovanna Jackson with MAZE ablation (April 2013 Dr Zhang [facility]) presents to PST with complaints of worsening shortness of breath. States in 06/2023 she started to notice worsening shortness of breath and STEWART which has gotten progressively worse since. She has also had 2 episodes of atrial fibrillation with cardioversions since 06/2023 with most recent cardioversion 01/2024. She was recently admitted to [facility] with complaints of 3 weeks of worsening SOB and STEWART, acute on chronic diastolic CHF exacerbation, with SARINA showing severe prosthetic mitral stenosis and severe TR, s/p LHC with no obstructive CAD noted.  Patient is scheduled for mitral valve repair, possible replace, tricuspid valve replacement with Dr. Jimenez on 1/31/23.  Admitted1/30 for heparin drip prior to surgery.  Pt now s/p RE-op sternotomy / Re-do MVR(T) TV repair 1/31 with Dr. Jimenez   
68 year old female with a PMHx of HTN, HLD, Left Breast Cancer (s/p XRT and lumpectomy; 2009), pAF (on Eliquis), and Mitral valve stenosis s/p Mitral valve replacement (#29 Perimount Giovanna Jackson) with MAZE ablation (April 2013 with Dr. Zhang at [facility]), recent cardioversions for afib, with patient found to have Severe prosthetic mitral stenosis and Severe TR when admitted for acute on chronic diastolic CHF exacerbation, was preadmitted for a heparin gtt and underwent Re-op mitral valve replacement, tricuspid valve repair with Dr. Jimenez on 1/31/23. Postop course significant for ABLA (stable), and thrombocytopenia (stable), pain control issues .
68 year old female with a PMHx of HTN, HLD, Left Breast Cancer (s/p XRT and lumpectomy; 2009), pAF (on Eliquis), and Mitral valve stenosis s/p Mitral valve replacement (#29 Perimount Giovanna Jackson) with MAZE ablation (April 2013 with Dr. Zhang at [facility]), recent cardioversions for afib, with patient found to have Severe prosthetic mitral stenosis and Severe TR when admitted for acute on chronic diastolic CHF exacerbation, was preadmitted for a heparin gtt and underwent Re-op mitral valve replacement, tricuspid valve repair with Dr. Jimenez on 1/31/23. Postop course significant for ABLA (stable), and thrombocytopenia (stable), pain control issues .
68 year old female with a PMHx of HTN, HLD, Left Breast Cancer (s/p XRT and lumpectomy; 2009), pAF (on Eliquis), and Mitral valve stenosis s/p Mitral valve replacement (#29 Perimount Giovanna Jackson) with MAZE ablation (April 2013 with Dr. Zhang at [facility]), recent cardioversions for afib, with patient found to have Severe prosthetic mitral stenosis and Severe TR when admitted for acute on chronic diastolic CHF exacerbation, was preadmitted for a heparin gtt and underwent Re-op mitral valve replacement, tricuspid valve repair with Dr. Jimenez on 1/31/23. Postop course significant for ABLA (stable), and thrombocytopenia (stable), pain control issues

## 2024-02-08 NOTE — PROGRESS NOTE ADULT - PROBLEM SELECTOR PLAN 8
Protonix for GI prophylaxis.  Lovenox / SCDs for DVT prophylaxis.    Plan to be discussed with Dr. Jimenez and CTS team during AM rounds.

## 2024-02-08 NOTE — PROGRESS NOTE ADULT - PROBLEM SELECTOR PLAN 2
Plan as above.
Patient is scheduled for mitral valve repair, possible replace, tricuspid valve replacement with Dr. Jimenez on 1/31/23, see above
same as above
Plan as above.
Transposition Flap Text: The defect edges were debeveled with a #15 scalpel blade.  Given the location of the defect and the proximity to free margins a transposition flap was deemed most appropriate.  Using a sterile surgical marker, an appropriate transposition flap was drawn incorporating the defect.    The area thus outlined was incised deep to adipose tissue with a #15 scalpel blade.  The skin margins were undermined to an appropriate distance in all directions utilizing iris scissors.

## 2024-02-08 NOTE — PROGRESS NOTE ADULT - PROBLEM SELECTOR PROBLEM 5
HLD (hyperlipidemia)
Paroxysmal atrial fibrillation
HLD (hyperlipidemia)

## 2024-02-08 NOTE — PROGRESS NOTE ADULT - PROBLEM SELECTOR PLAN 5
Continue Statin.
Continue liptor
continue liptor   dash diet
As per Dr. Jimenez discontinued eliquis 5 days prior to procedure.  Will start heparin drip.
Continue Statin.
Continue Statin.
continue liptor   dash diet

## 2024-02-08 NOTE — PROGRESS NOTE ADULT - PROBLEM SELECTOR PROBLEM 4
HTN (hypertension)
At risk for venous thromboembolism (VTE)
HTN (hypertension)

## 2024-02-08 NOTE — PROGRESS NOTE ADULT - PROBLEM SELECTOR PROBLEM 1
Mitral valve insufficiency

## 2024-02-08 NOTE — PROGRESS NOTE ADULT - PROBLEM SELECTOR PROBLEM 7
ABLA (acute blood loss anemia)
Prophylactic measure
ABLA (acute blood loss anemia)
Prophylactic measure
ABLA (acute blood loss anemia)

## 2024-02-08 NOTE — PROGRESS NOTE ADULT - PROBLEM SELECTOR PLAN 6
On Eliquis at home (held in the perioperative setting).  Plan to resume Eliquis on discharge.   Continue lopressor as tolerated by HR and BP.
on eliquis at home (held in the perioperative setting)
On eliquis at home (held in the perioperative setting)  Will need home AC regimen on discharge
On Eliquis at home (held in the perioperative setting).  Plan to resume Eliquis on discharge.   Continue lopressor as tolerated by HR and BP.
On Eliquis at home (held in the perioperative setting).  Will need home AC regimen on discharge.  Continue lopressor as tolerated by HR and BP.
On Eliquis at home (held in the perioperative setting).  Plan to resume Eliquis on discharge.   Continue lopressor as tolerated by HR and BP.
on eliquis at home (held in the perioperative setting)  Will eventually need home AC regimen
On Eliquis at home (held in the perioperative setting).  Plan to resume Eliquis on discharge.   Continue lopressor as tolerated by HR and BP.

## 2024-02-08 NOTE — PROGRESS NOTE ADULT - PROBLEM SELECTOR PROBLEM 3
Tricuspid insufficiency

## 2024-02-08 NOTE — PROGRESS NOTE ADULT - PROVIDER SPECIALTY LIST ADULT
Critical Care
CT Surgery
Thoracic Surgery
CT Surgery

## 2024-02-08 NOTE — PROGRESS NOTE ADULT - SUBJECTIVE AND OBJECTIVE BOX
Patient seen and examined.  Denies CP, SOB, N/V.  She feels more comfortable with her pain regimen and anticipates discharge later today    T(C): 36.6 (02-07-24 @ 21:00)  T(F): 97.8 (02-07-24 @ 21:00)  HR: 91 (02-07-24 @ 21:00)  BP: 95/63 (02-07-24 @ 21:00)  BP(mean): 78 (02-07-24 @ 16:33)    RR: 17 (02-07-24 @ 21:00)  SpO2: 96% (02-07-24 @ 21:00)      Physical Exam:  Gen: A&Ox3  Pulm:  CTA b/l, no r/r/w  CV:  S1S2, RRR, no m/r/g  Abd: +BS, soft, NT, ND  Ext:  +DP b/l,Mild edema  Incision:  c/d/i no click, no exudate     I&O's Detail    06 Feb 2024 07:01  -  07 Feb 2024 07:00  --------------------------------------------------------  IN:    Oral Fluid: 960 mL  Total IN: 960 mL    OUT:    Voided (mL): 1350 mL  Total OUT: 1350 mL    Total NET: -390 mL      07 Feb 2024 07:01  -  08 Feb 2024 00:37  --------------------------------------------------------  IN:    Oral Fluid: 100 mL  Total IN: 100 mL    OUT:  Total OUT: 0 mL    Total NET: 100 mL                              9.3    6.66  )-----------( 221      ( 07 Feb 2024 05:18 )             27.1   02-07    134<L>  |  98  |  20.1<H>  ----------------------------<  105<H>  4.1   |  27.0  |  0.76    Ca    8.6      07 Feb 2024 05:18  Mg     2.1     02-07    TPro  6.0<L>  /  Alb  3.4  /  TBili  0.6  /  DBili  x   /  AST  29  /  ALT  28  /  AlkPhos  114  02-06      CAPILLARY BLOOD GLUCOSE            Medications:  acetaminophen     Tablet .. 650 milliGRAM(s) Oral every 6 hours PRN  apixaban 5 milliGRAM(s) Oral every 12 hours  aspirin enteric coated 325 milliGRAM(s) Oral daily  bisacodyl Suppository 10 milliGRAM(s) Rectal once  bisacodyl Suppository 10 milliGRAM(s) Rectal daily PRN  chlorhexidine 2% Cloths 1 Application(s) Topical daily  cyclobenzaprine 5 milliGRAM(s) Oral three times a day PRN  ferrous    sulfate 325 milliGRAM(s) Oral daily  folic acid 1 milliGRAM(s) Oral daily  furosemide    Tablet 40 milliGRAM(s) Oral daily  lidocaine   4% Patch 1 Patch Transdermal daily  melatonin 5 milliGRAM(s) Oral at bedtime  metoprolol tartrate 25 milliGRAM(s) Oral two times a day  ondansetron Injectable 4 milliGRAM(s) IV Push every 6 hours PRN  pantoprazole    Tablet 40 milliGRAM(s) Oral daily  polyethylene glycol 3350 17 Gram(s) Oral daily  senna 2 Tablet(s) Oral at bedtime  traMADol 25 milliGRAM(s) Oral every 4 hours PRN

## 2024-02-08 NOTE — PROGRESS NOTE ADULT - PROBLEM SELECTOR PLAN 7
Requiring PRBC transfusion, additional 1 uprbc given 2/6  Received Venofer.  Now on PO anemia medications with ferrous sulfate and folic acid.  Trend CBC daily.
Requiring PRBC transfusion.  Received Venofer.  Now on PO anemia medications with ferrous sulfate and folic acid.  Trend CBC daily.
Protonix for GI prophylaxis.  Lovenox/SCDs for DVT prophylaxis.      Plan to be discussed with Dr. Jimenez and CTS team during AM rounds.
Protonix for GI prophylaxis.  Lovenox/SCDs for DVT prophylaxis.      Plan to be discussed with Dr. Jimenez and CTS team during AM rounds.
Requiring PRBC transfusion, additional 1 uprbc given 2/6  Received Venofer.  Now on PO anemia medications with ferrous sulfate and folic acid.  Trend CBC daily.
Requiring PRBC transfusion.  Received Venofer.  Now on PO anemia medications with ferrous sulfate and folic acid.  Trend CBC daily.
Requiring PRBC transfusion  Received Venofer  Now on PO anemia medications with ferrous sulfate and folic acid  Trend CBC daily
Requiring PRBC transfusion, additional 1 uprbc given 2/6  Received Venofer.  Now on PO anemia medications with ferrous sulfate and folic acid.  Trend CBC daily.

## 2024-02-09 ENCOUNTER — APPOINTMENT (OUTPATIENT)
Dept: CARE COORDINATION | Facility: HOME HEALTH | Age: 69
End: 2024-02-09

## 2024-02-09 ENCOUNTER — NON-APPOINTMENT (OUTPATIENT)
Age: 69
End: 2024-02-09

## 2024-02-09 RX ORDER — ROSUVASTATIN CALCIUM 20 MG/1
20 TABLET, FILM COATED ORAL
Refills: 0 | Status: ACTIVE | COMMUNITY

## 2024-02-09 RX ORDER — SPIRONOLACTONE 25 MG/1
25 TABLET ORAL
Refills: 0 | Status: DISCONTINUED | COMMUNITY
Start: 2024-01-16 | End: 2024-02-09

## 2024-02-09 RX ORDER — CYCLOBENZAPRINE HYDROCHLORIDE 5 MG/1
5 TABLET, FILM COATED ORAL
Refills: 0 | Status: ACTIVE | COMMUNITY

## 2024-02-09 RX ORDER — METOPROLOL SUCCINATE 25 MG/1
25 TABLET, EXTENDED RELEASE ORAL
Qty: 90 | Refills: 0 | Status: DISCONTINUED | COMMUNITY
Start: 2020-08-11 | End: 2024-02-09

## 2024-02-11 ENCOUNTER — NON-APPOINTMENT (OUTPATIENT)
Age: 69
End: 2024-02-11

## 2024-02-13 ENCOUNTER — APPOINTMENT (OUTPATIENT)
Dept: CARE COORDINATION | Facility: HOME HEALTH | Age: 69
End: 2024-02-13

## 2024-02-13 ENCOUNTER — NON-APPOINTMENT (OUTPATIENT)
Age: 69
End: 2024-02-13

## 2024-02-13 ENCOUNTER — APPOINTMENT (OUTPATIENT)
Dept: CARDIOTHORACIC SURGERY | Facility: CLINIC | Age: 69
End: 2024-02-13
Payer: MEDICARE

## 2024-02-14 ENCOUNTER — APPOINTMENT (OUTPATIENT)
Dept: CARDIOTHORACIC SURGERY | Facility: CLINIC | Age: 69
End: 2024-02-14
Payer: MEDICARE

## 2024-02-14 VITALS
OXYGEN SATURATION: 97 % | HEIGHT: 68 IN | RESPIRATION RATE: 16 BRPM | HEART RATE: 77 BPM | SYSTOLIC BLOOD PRESSURE: 112 MMHG | DIASTOLIC BLOOD PRESSURE: 72 MMHG | BODY MASS INDEX: 23.34 KG/M2 | WEIGHT: 154 LBS

## 2024-02-14 PROCEDURE — 99024 POSTOP FOLLOW-UP VISIT: CPT

## 2024-02-14 RX ORDER — METOPROLOL TARTRATE 50 MG/1
50 TABLET, FILM COATED ORAL
Qty: 60 | Refills: 0 | Status: COMPLETED | COMMUNITY
Start: 2024-01-09

## 2024-02-14 RX ORDER — METOPROLOL SUCCINATE 50 MG/1
50 TABLET, EXTENDED RELEASE ORAL
Qty: 10 | Refills: 0 | Status: COMPLETED | COMMUNITY
Start: 2023-12-07

## 2024-02-14 RX ORDER — FUROSEMIDE 20 MG/1
20 TABLET ORAL
Qty: 30 | Refills: 0 | Status: COMPLETED | COMMUNITY
Start: 2024-01-09

## 2024-02-14 RX ORDER — POTASSIUM CHLORIDE 20 MEQ/15ML
20 MEQ/15ML SOLUTION ORAL
Qty: 105 | Refills: 0 | Status: COMPLETED | COMMUNITY
Start: 2024-02-08

## 2024-02-14 NOTE — REASON FOR VISIT
[de-identified] : Re-operative cternotomy with Mitral valve replacement #29 Pizarro II porcine valve and tricuspid valve repair with #28 Tri-Ad ATS band [de-identified] : 01/31/24

## 2024-02-14 NOTE — ASSESSMENT
[FreeTextEntry1] : Today on exam patient's lungs clear bilaterally, normal sinus rhythm, sternum stable, incision clean, dry and intact. No peripheral edema noted. Instructed patient on importance of optimal glycemic control, daily showering, daily weights, incentive spirometer use, and increase ambulation as tolerated. Instructed to call office with any signs or symptoms of infection or weight gain of 2 or more pounds in 1 day or 3 or more pounds in 1 week. She will be following up with Cardiology, and will return to care on an as needed basis.   PLAN: - Continue care with PCP - Continue care with Cardiology  - Return to care as needed

## 2024-02-15 LAB — SURGICAL PATHOLOGY STUDY: SIGNIFICANT CHANGE UP

## 2024-03-06 ENCOUNTER — OFFICE (OUTPATIENT)
Dept: URBAN - METROPOLITAN AREA CLINIC 103 | Facility: CLINIC | Age: 69
Setting detail: OPHTHALMOLOGY
End: 2024-03-06
Payer: MEDICARE

## 2024-03-06 VITALS — BODY MASS INDEX: 24.25 KG/M2 | HEIGHT: 68 IN | WEIGHT: 160 LBS

## 2024-03-06 DIAGNOSIS — H16.223: ICD-10-CM

## 2024-03-06 DIAGNOSIS — D31.31: ICD-10-CM

## 2024-03-06 PROCEDURE — 92014 COMPRE OPH EXAM EST PT 1/>: CPT | Performed by: OPHTHALMOLOGY

## 2024-03-06 PROCEDURE — 92250 FUNDUS PHOTOGRAPHY W/I&R: CPT | Performed by: OPHTHALMOLOGY

## 2024-03-06 ASSESSMENT — REFRACTION_CURRENTRX
OD_OVR_VA: 20/
OS_VPRISM_DIRECTION: SV
OS_ADD: +1.50
OD_ADD: +1.50
OS_OVR_VA: 20/
OD_VPRISM_DIRECTION: SV

## 2024-03-06 ASSESSMENT — REFRACTION_MANIFEST
OS_CYLINDER: -0.75
OS_VA1: 20/20
OD_ADD: +2.25
OS_ADD: +2.25
OS_CYLINDER: SPH
OD_VA1: 20/20-
OS_SPHERE: +0.50
OD_CYLINDER: SPH
OS_VA1: 20/25-2
OD_CYLINDER: -0.50
OD_VA1: 20/25-1
OD_SPHERE: +0.75
OU_VA: 20/20-1
OD_AXIS: 120
OS_AXIS: 35
OD_SPHERE: PLANO
OS_SPHERE: PLANO

## 2024-04-18 ENCOUNTER — APPOINTMENT (OUTPATIENT)
Dept: CARDIOTHORACIC SURGERY | Facility: CLINIC | Age: 69
End: 2024-04-18
Payer: MEDICARE

## 2024-04-18 VITALS
HEART RATE: 93 BPM | SYSTOLIC BLOOD PRESSURE: 128 MMHG | OXYGEN SATURATION: 97 % | RESPIRATION RATE: 16 BRPM | DIASTOLIC BLOOD PRESSURE: 79 MMHG | TEMPERATURE: 97.6 F

## 2024-04-18 DIAGNOSIS — I34.0 NONRHEUMATIC MITRAL (VALVE) INSUFFICIENCY: ICD-10-CM

## 2024-04-18 DIAGNOSIS — I05.0 RHEUMATIC MITRAL STENOSIS: ICD-10-CM

## 2024-04-18 PROCEDURE — 99024 POSTOP FOLLOW-UP VISIT: CPT

## 2024-04-18 RX ORDER — FUROSEMIDE 40 MG/1
40 TABLET ORAL
Refills: 0 | Status: COMPLETED | COMMUNITY
Start: 2024-01-16 | End: 2024-04-18

## 2024-04-18 NOTE — REASON FOR VISIT
[de-identified] :  Re-operative sternotomy with Mitral valve replacement #29 Ipzarro II porcine bioprosthetic valve and tricuspid valve repair with #28 Tri-Ad ATS band [de-identified] : 1/31/24 [de-identified] : Patient discharged to home on 2/5/24.  Today Ms. Shearer states that she is feeling well postoperatively. She was evaluated by cardiologist Dr Schwartz yesterday and was advised to follow up today regarding the presence of a possible sternal suture. She denies chest pain, palpitations, dizziness, syncope, lower extremity edema, shortness of breath, weight loss/weight gain.

## 2024-04-18 NOTE — ASSESSMENT
[FreeTextEntry1] : Ms. Shearer presents today for evaluation of her sternal incision. She is status post re-operative sternotomy with Mitral valve replacement #29 Pizarro II porcine bioprosthetic valve and tricuspid valve repair with #28 Tri-Ad ATS band with Dr. Jimenez from 1/31/24.   Today on exam patient's lungs clear bilaterally, sternum stable, incision clean, dry and intact. Visible suture noted on sternal incision between breasts - site cleansed and suture removed, patient tolerated well. No peripheral edema noted. Instructed patient on importance of optimal glycemic control, daily showering, daily weights, incentive spirometer use, and increase ambulation as tolerated. Instructed to call office with any signs or symptoms of infection or weight gain of 2 or more pounds in 1 day or 3 or more pounds in 1 week.  Overall, I am pleased with her progress postoperatively. I am recommending that she continue follow up care with Cardiology and return to care in office as needed. All questions answered, patient verbalizes understanding.    PLAN: - Continue follow up care with Cardiology - Return to care in office as needed

## 2024-04-18 NOTE — COUNSELING
[Weight Management] : weight management [Low Fat/Low Cholesterol Diet] : low fat/low cholesterol diet [Stress Management] : stress management

## 2024-04-18 NOTE — PHYSICAL EXAM
[] : no respiratory distress [Respiration, Rhythm And Depth] : normal respiratory rhythm and effort [Heart Rate And Rhythm] : heart rate was normal and rhythm regular [Auscultation Breath Sounds / Voice Sounds] : lungs were clear to auscultation bilaterally [Heart Sounds] : normal S1 and S2 [Clean] : clean [Dry] : dry [Healing Well] : healing well [FreeTextEntry1] : visible suture noted on sternal incision between breasts [No Edema] : no edema

## 2024-04-30 ENCOUNTER — NON-APPOINTMENT (OUTPATIENT)
Age: 69
End: 2024-04-30

## 2024-04-30 ENCOUNTER — APPOINTMENT (OUTPATIENT)
Dept: ELECTROPHYSIOLOGY | Facility: CLINIC | Age: 69
End: 2024-04-30
Payer: MEDICARE

## 2024-04-30 VITALS
TEMPERATURE: 97.6 F | BODY MASS INDEX: 24.25 KG/M2 | DIASTOLIC BLOOD PRESSURE: 89 MMHG | HEART RATE: 112 BPM | HEIGHT: 68 IN | RESPIRATION RATE: 16 BRPM | WEIGHT: 160 LBS | OXYGEN SATURATION: 97 % | SYSTOLIC BLOOD PRESSURE: 147 MMHG

## 2024-04-30 VITALS
SYSTOLIC BLOOD PRESSURE: 147 MMHG | HEART RATE: 112 BPM | WEIGHT: 160 LBS | OXYGEN SATURATION: 97 % | DIASTOLIC BLOOD PRESSURE: 89 MMHG | BODY MASS INDEX: 24.33 KG/M2

## 2024-04-30 PROCEDURE — 93000 ELECTROCARDIOGRAM COMPLETE: CPT

## 2024-04-30 PROCEDURE — 99205 OFFICE O/P NEW HI 60 MIN: CPT

## 2024-04-30 RX ORDER — DILTIAZEM HYDROCHLORIDE 180 MG/1
180 CAPSULE, EXTENDED RELEASE ORAL
Qty: 30 | Refills: 3 | Status: ACTIVE | COMMUNITY
Start: 2024-04-30

## 2024-04-30 RX ORDER — METOPROLOL TARTRATE 50 MG/1
50 TABLET ORAL
Qty: 60 | Refills: 0 | Status: ACTIVE | COMMUNITY

## 2024-04-30 RX ORDER — APIXABAN 5 MG/1
5 TABLET, FILM COATED ORAL
Qty: 180 | Refills: 3 | Status: ACTIVE | COMMUNITY

## 2024-04-30 NOTE — DISCUSSION/SUMMARY
[FreeTextEntry1] : WE discussed options of AA therapy vs ablation. With previous MV disease and LA dilatation and MAZE, ablation would be best option. She would like to avoid further medications. Her success rate may be 60-70% given extent of previous disease and I explained to her that she might need two procedures. I quoted her a <0.5% risk of serious complications such as MI, death, CVA, AE fistula. She will stay on apixaban until night before and I will get a SARINA to better visualize NOA stump. EKG shows atrial tachycardia with 2:1 conduction with V rate of 110 bpm and normal QRS. I will review her TTE images done post surgery this year prior to the procedure [EKG obtained to assist in diagnosis and management of assessed problem(s)] : EKG obtained to assist in diagnosis and management of assessed problem(s)

## 2024-04-30 NOTE — PHYSICAL EXAM
[Well Developed] : well developed [Well Nourished] : well nourished [No Acute Distress] : no acute distress [Normal Conjunctiva] : normal conjunctiva [No Carotid Bruit] : no carotid bruit [Normal S1, S2] : normal S1, S2 [No Murmur] : no murmur [No Rub] : no rub [No Gallop] : no gallop [Clear Lung Fields] : clear lung fields [Good Air Entry] : good air entry [No Respiratory Distress] : no respiratory distress  [Soft] : abdomen soft [Non Tender] : non-tender [No Masses/organomegaly] : no masses/organomegaly [Normal Bowel Sounds] : normal bowel sounds [Normal Gait] : normal gait [No Edema] : no edema [No Cyanosis] : no cyanosis [No Clubbing] : no clubbing [No Varicosities] : no varicosities [No Rash] : no rash [No Skin Lesions] : no skin lesions [Moves all extremities] : moves all extremities [No Focal Deficits] : no focal deficits [Normal Speech] : normal speech [Alert and Oriented] : alert and oriented [Normal memory] : normal memory [de-identified] : tachycardic [de-identified] : flutter waves in JVP

## 2024-04-30 NOTE — CARDIOLOGY SUMMARY
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  TTE(2/2024): EF 50-55%; severely dilated LA; mildly dilated RA  [de-identified] : 1/2024: minor irregularities

## 2024-04-30 NOTE — HISTORY OF PRESENT ILLNESS
[FreeTextEntry1] : I had the pleasure of seeing Jeniffer Shearer today for consultation for atrial arrhythmia management in the arrhythmia clinic at [facility]. As you well know, she is a pleasant 69 year old woman with a history of atrial fibrillation/flutter s/p prior DCCVs in past, HLD, breast cancer s/p RT and lumpectomy, MV disease s/p bio MVR/NOA occlusion/MAZE 4/2013, subsequent severe prosthesis stenosis and s/p redo sternotomy with bio MVR/TV repair 1/31/2024 at [place]. Immediately post surgery she was in SR. EKG from 2/12/2024 demonstrated an atypical AFL with subsequent rhythm strips/EKGs showing persistent atypical AFL with  bpm. She did have AFL on EKG pre-op in August 2023 and underwent a DCCV in 12/2023. She remains on Eliquis and diltiazem and metoprolol. Digoxin was recently added to her medication regimen. An intra-op SARINA demonstrates NOA appeared to be amputated with a residual pouch about 2cm in depth.   She reports occasional SOB and fatigue. She denies CP, near syncope or syncope.  She reports her symptoms waxes and wanes, and sometimes feels fatigue during cardiac rehab.  She has been compliant with her Eliquis.  She states she has noted on her Fitbit watch that her heart rates fluctuate, however she feels that her watch may be inaccurate as at times it reads that her heart rates are under 100 bpm but meanwhile on the monitor in cardiac rehab it demonstrates her heart rate are at 120 bpm.

## 2024-04-30 NOTE — END OF VISIT
Post-Anesthesia Evaluation and Assessment    Cardiovascular Function/Vital Signs  Visit Vitals    BP (!) 150/96    Pulse 93    Temp 36.5 °C (97.7 °F)    Resp 16    Ht 5' 4.75\" (1.645 m)    Wt 93.6 kg (206 lb 5 oz)    SpO2 99%    BMI 34.6 kg/m2       Patient is status post Procedure(s):  EGD, BIOPSY. Nausea/Vomiting: Controlled. Postoperative hydration reviewed and adequate. Pain:  Pain Scale 1: Numeric (0 - 10) (08/09/18 1602)  Pain Intensity 1: 0 (08/09/18 1602)   Managed. Neurological Status:   Neuro (WDL): Within Defined Limits (08/09/18 1536)   At baseline. Mental Status and Level of Consciousness: Baseline and stable. Pulmonary Status:   O2 Device: Room air (08/09/18 1536)   Adequate oxygenation and airway patent. Complications related to anesthesia: None    Post-anesthesia assessment completed. No concerns. Patient has met all discharge requirements.     Signed By: Tremayne Mccord MD
[Time Spent: ___ minutes] : I have spent [unfilled] minutes of time on the encounter.

## 2024-04-30 NOTE — DISCUSSION/SUMMARY
[EKG obtained to assist in diagnosis and management of assessed problem(s)] : EKG obtained to assist in diagnosis and management of assessed problem(s) [FreeTextEntry1] : In summary, Ms. Shearer is a 69-year-old female with a history of atrial arrhythmias, prior bio MVR/NOA occlusion/MAZE in 2013 with redo sternotomy with redo bio MVR/TV repair on January 31, 2024, and atrial arrhythmias.  Most recently she appears to be in a persistent atypical atrial flutter. We discussed the pathophysiology of atrial arrhythmias in setting of multiple valvular surgeries including management strategies. We discussed options of medication management versus ablation. The options of a cardioversion and a catheter ablation were discussed. Risks and benefits were discussed with each option. The procedures, outcomes and risks of ablation were reviewed. Risks discussed included but were not limited to bleeding, vascular damage, cardiac perforation, tamponade, phrenic nerve injury, stroke, pulmonary vein stenosis and atrial esophageal fistula. Given her persistent atypical atrial flutter 3 months post surgery, she would benefit from an ablation. However, given she has persistent heart rates in the 120s bpm we discussed as first step to proceed with a cardioversion and then schedule ablation several weeks later as unable to schedule ablation until several weeks later. Patient prefers to have ablation much sooner and therefore have referred her to Dr. Cordova.   Discussed with patient that she will definitely require AC short-term especially with planned intervention for her atrial arrhythmias. Regarding long-term AC, we discussed that we would need to review the images of the SARINA to review the "residual pouch". If there is no significant residual pouch with trabeculae, then she may be able to come off AC at some point in the future. Otherwise, we stated that she would require long-term AC if the residual pouch demonstrates an area where there can be collection of blood.

## 2024-04-30 NOTE — HISTORY OF PRESENT ILLNESS
[FreeTextEntry1] :     Jeniffer Shearer is a 69-year-old woman who was seen today in consultation for drug refractory atrial tachycardia in the setting of previous bioprosthetic MVR (2nd time, most recently 1/24). Pre procedure SARINA showed low normal LV systolic function, normal RV, severely dilated LA and severe bioprosthetic valve stenosis. She is s/p redo sternotomy with bio MVR/TV repair and  as well as NOA clipping 1/31/2024 at [facility]. Immediately post surgery she was in SR. EKG from 2/12/2024 demonstrated an atypical AFL with subsequent rhythm strips/EKGs showing persistent atypical AFL with  bpm. She did have AFL on EKG pre-op in August 2023 and underwent a DCCV in 12/2023. She remains on Eliquis and diltiazem and metoprolol. Digoxin was recently added to her medication regimen, despite which HR remains at 110 bpm,  She reports occasional SOB and fatigue but no chest pain or syncope.  An intra-op SARINA demonstrated NOA was ligated with a residual pouch.. She has in past, HLD, breast cancer s/p RT and lumpectomy, MV disease s/p bio MVR/NOA occlusion/MAZE 4/2013. She is here to discuss options for therapy.

## 2024-05-02 ENCOUNTER — NON-APPOINTMENT (OUTPATIENT)
Age: 69
End: 2024-05-02

## 2024-05-10 ENCOUNTER — OUTPATIENT (OUTPATIENT)
Dept: INPATIENT UNIT | Facility: HOSPITAL | Age: 69
LOS: 1 days | End: 2024-05-10
Payer: MEDICARE

## 2024-05-10 ENCOUNTER — RESULT REVIEW (OUTPATIENT)
Age: 69
End: 2024-05-10

## 2024-05-10 ENCOUNTER — APPOINTMENT (OUTPATIENT)
Dept: CV DIAGNOSITCS | Facility: HOSPITAL | Age: 69
End: 2024-05-10

## 2024-05-10 VITALS
DIASTOLIC BLOOD PRESSURE: 84 MMHG | SYSTOLIC BLOOD PRESSURE: 120 MMHG | HEIGHT: 68 IN | WEIGHT: 160.06 LBS | RESPIRATION RATE: 16 BRPM | HEART RATE: 66 BPM | TEMPERATURE: 97 F | OXYGEN SATURATION: 98 %

## 2024-05-10 DIAGNOSIS — I48.4 ATYPICAL ATRIAL FLUTTER: ICD-10-CM

## 2024-05-10 DIAGNOSIS — Z98.890 OTHER SPECIFIED POSTPROCEDURAL STATES: Chronic | ICD-10-CM

## 2024-05-10 DIAGNOSIS — Z95.2 PRESENCE OF PROSTHETIC HEART VALVE: Chronic | ICD-10-CM

## 2024-05-10 LAB
ANION GAP SERPL CALC-SCNC: 10 MMOL/L — SIGNIFICANT CHANGE UP (ref 5–17)
BLD GP AB SCN SERPL QL: NEGATIVE — SIGNIFICANT CHANGE UP
BUN SERPL-MCNC: 21 MG/DL — SIGNIFICANT CHANGE UP (ref 7–23)
CALCIUM SERPL-MCNC: 9.7 MG/DL — SIGNIFICANT CHANGE UP (ref 8.4–10.5)
CHLORIDE SERPL-SCNC: 102 MMOL/L — SIGNIFICANT CHANGE UP (ref 96–108)
CO2 SERPL-SCNC: 27 MMOL/L — SIGNIFICANT CHANGE UP (ref 22–31)
CREAT SERPL-MCNC: 0.8 MG/DL — SIGNIFICANT CHANGE UP (ref 0.5–1.3)
EGFR: 81 ML/MIN/1.73M2 — SIGNIFICANT CHANGE UP
GLUCOSE SERPL-MCNC: 99 MG/DL — SIGNIFICANT CHANGE UP (ref 70–99)
HCT VFR BLD CALC: 35.3 % — SIGNIFICANT CHANGE UP (ref 34.5–45)
HGB BLD-MCNC: 11.4 G/DL — LOW (ref 11.5–15.5)
MCHC RBC-ENTMCNC: 29.7 PG — SIGNIFICANT CHANGE UP (ref 27–34)
MCHC RBC-ENTMCNC: 32.3 GM/DL — SIGNIFICANT CHANGE UP (ref 32–36)
MCV RBC AUTO: 91.9 FL — SIGNIFICANT CHANGE UP (ref 80–100)
NRBC # BLD: 0 /100 WBCS — SIGNIFICANT CHANGE UP (ref 0–0)
PLATELET # BLD AUTO: 227 K/UL — SIGNIFICANT CHANGE UP (ref 150–400)
POTASSIUM SERPL-MCNC: 4 MMOL/L — SIGNIFICANT CHANGE UP (ref 3.5–5.3)
POTASSIUM SERPL-SCNC: 4 MMOL/L — SIGNIFICANT CHANGE UP (ref 3.5–5.3)
RBC # BLD: 3.84 M/UL — SIGNIFICANT CHANGE UP (ref 3.8–5.2)
RBC # FLD: 13.7 % — SIGNIFICANT CHANGE UP (ref 10.3–14.5)
RH IG SCN BLD-IMP: NEGATIVE — SIGNIFICANT CHANGE UP
RH IG SCN BLD-IMP: NEGATIVE — SIGNIFICANT CHANGE UP
SODIUM SERPL-SCNC: 139 MMOL/L — SIGNIFICANT CHANGE UP (ref 135–145)
WBC # BLD: 6.29 K/UL — SIGNIFICANT CHANGE UP (ref 3.8–10.5)
WBC # FLD AUTO: 6.29 K/UL — SIGNIFICANT CHANGE UP (ref 3.8–10.5)

## 2024-05-10 PROCEDURE — 93662 INTRACARDIAC ECG (ICE): CPT | Mod: 26

## 2024-05-10 PROCEDURE — 93653 COMPRE EP EVAL TX SVT: CPT

## 2024-05-10 RX ORDER — FOLIC ACID 0.8 MG
1 TABLET ORAL DAILY
Refills: 0 | Status: DISCONTINUED | OUTPATIENT
Start: 2024-05-10 | End: 2024-05-11

## 2024-05-10 RX ORDER — DOCUSATE SODIUM 100 MG
1 CAPSULE ORAL
Refills: 0 | DISCHARGE

## 2024-05-10 RX ORDER — CYCLOBENZAPRINE HYDROCHLORIDE 10 MG/1
5 TABLET, FILM COATED ORAL THREE TIMES A DAY
Refills: 0 | Status: DISCONTINUED | OUTPATIENT
Start: 2024-05-10 | End: 2024-05-11

## 2024-05-10 RX ORDER — BENZOCAINE AND MENTHOL 5; 1 G/100ML; G/100ML
1 LIQUID ORAL ONCE
Refills: 0 | Status: COMPLETED | OUTPATIENT
Start: 2024-05-10 | End: 2024-05-11

## 2024-05-10 RX ORDER — ROSUVASTATIN CALCIUM 5 MG/1
1 TABLET ORAL
Qty: 0 | Refills: 0 | DISCHARGE

## 2024-05-10 RX ORDER — METOPROLOL TARTRATE 50 MG
50 TABLET ORAL
Refills: 0 | Status: DISCONTINUED | OUTPATIENT
Start: 2024-05-10 | End: 2024-05-11

## 2024-05-10 RX ORDER — CHOLECALCIFEROL (VITAMIN D3) 125 MCG
1 CAPSULE ORAL
Refills: 0 | DISCHARGE

## 2024-05-10 RX ORDER — FERROUS SULFATE 325(65) MG
325 TABLET ORAL DAILY
Refills: 0 | Status: DISCONTINUED | OUTPATIENT
Start: 2024-05-10 | End: 2024-05-11

## 2024-05-10 RX ORDER — ATORVASTATIN CALCIUM 80 MG/1
80 TABLET, FILM COATED ORAL AT BEDTIME
Refills: 0 | Status: DISCONTINUED | OUTPATIENT
Start: 2024-05-10 | End: 2024-05-11

## 2024-05-10 RX ORDER — APIXABAN 2.5 MG/1
5 TABLET, FILM COATED ORAL EVERY 12 HOURS
Refills: 0 | Status: DISCONTINUED | OUTPATIENT
Start: 2024-05-10 | End: 2024-05-11

## 2024-05-10 RX ADMIN — APIXABAN 5 MILLIGRAM(S): 2.5 TABLET, FILM COATED ORAL at 20:01

## 2024-05-10 RX ADMIN — Medication 50 MILLIGRAM(S): at 17:08

## 2024-05-10 RX ADMIN — ATORVASTATIN CALCIUM 80 MILLIGRAM(S): 80 TABLET, FILM COATED ORAL at 20:02

## 2024-05-10 RX ADMIN — Medication 1 MILLIGRAM(S): at 17:08

## 2024-05-10 NOTE — PRE-ANESTHESIA EVALUATION ADULT - NSRADCARDRESULTSFT_GEN_ALL_CORE
< from: TTE Limited W or WO Ultrasound Enhancing Agent (02.05.24 @ 15:12) >    CONCLUSIONS:      1. Left ventricular cavity is normal. Left ventricular systolic function is normal with an ejection fraction visually estimated at 50 to 55 %.   2. Normal right ventricular cavity size and borderline reduced systolic function.   3. The left atrium is severely dilated.   4. The right atrium is mildly dilated.   5. Bioprosthetic valve present.Giovanna-Jackson Perimount valve replacement in the mitral position. Transvalvular mitral gradients are elevated. There is No intravalvular mitral regurgitation. Doppler profile of the bioprosthetic MV should prompt a closer evaluation of valve function and/or other considerations such us increased flow, increased heart rate or PPM.   6. No pericardial effusion seen.   7.Aortic arch diameter is dilated, measuring 3.6 cm (indexed 1.85 cm/m²).   8. Compared to the transthoracic echocardiogram performed on 1/6/2024,.    < end of copied text >    < from: TTE Limited W or WO Ultrasound Enhancing Agent (02.05.24 @ 15:12) >

## 2024-05-10 NOTE — H&P CARDIOLOGY - PEDAL EDEMA TYPE
----- Message from Emma Vicente sent at 5/13/2019 11:32 AM CDT -----  Contact: daughter Fátima Jang   Patient daughter need to speak to nurse regarding has office received lab orders [facility]       Please call to advice Fátima Jang daughter at [phone]  
Daughter informed that we have not received labs from [facility]. Cristina   
No, we have not received labs for her  
pitting

## 2024-05-10 NOTE — PATIENT PROFILE ADULT - NSPRESCRALCFREQ_GEN_A_NUR
Consultation - [facility]  Zia Mcclain 70 y o  male MRN: [id number]  Unit/Bed#: -01 Encounter: [id number]        Consults    Reason for Consult / Principal Problem: anorexia, nausea, weight loss    ASSESSMENT and PLAN:    Active Problems:    * No active hospital problems  *    #1  Early satiety with nausea and weight loss: symptoms have been present for a while but worsening  Was treated for presumed candidal esophagitis 1 month ago with no improvement  Has hx of radiation esophagitis and candidal esophagitis in March 2018  Differential is broad including CMV/HSV esophagitis, candidal infection, gastritis, medication/chemo side effect, worsening malignancy, etc    -Protonix daily  -Diet as tolerated today, add ensure clear  Patient unable to tolerate regular ensure at home    -Anti-emetics and pain control as needed  -Ultimately, patient needs endoscopy to further assess  Can plan for this tomorrow pending respiratory status and CT scan findings  If patient's respiratory status can be optimized, we can postpone EGD until this is done as EGD is non-emergent  -NPO after midnight    -------------------------------------------------------------------------------------------------------------------    HPI:  This is a 82-year-old male with a history of lung cancer with bony metastasis, BPH, thyroid disease, and hypertension who presented to the emergency room from the oncologist's office secondary to weight loss, lack of appetite, minimal oral intake, and nausea  The patient has had a longstanding history of having lack of appetite and nausea however reports that this has been getting progressively worse  The patient has lost about 15 lb in about a month  He reports that he feels hungry, will start to eat, and will get full very quickly and then lose his appetite  He has been unable to tolerate Ensure at home  He reports nausea but no vomiting    He denies any acid reflux or difficulty swallowing  He reports that he was empirically treated for candidal esophagitis about 1 month ago without any improvement in symptoms  His last upper endoscopy was in March of 2018  At that time he was having severe odynophagia and dysphagia secondary to what was found to be radiation esophagitis and candidal esophagitis  The symptoms have improved  He is on aggressive bowel regimen but has been moving his bowels regularly without black tarry stools or blood in the stool  Patient's wife reports that they have tried multiple appetite stimulants with no improvement  He also suffers from dry mouth and excessive mucus production  He is on 2 L nasal cannula oxygen at home at rest and typically increases this to 5 L    REVIEW OF SYSTEMS:    CONSTITUTIONAL: Denies any fever, chills, or rigors  Decreased appetite and recent weight loss  HEENT: No earache or tinnitus  Denies hearing loss or visual disturbances  +dry mouth  CARDIOVASCULAR: No chest pain or palpitations  RESPIRATORY: Denies any cough, hemoptysis, +shortness of breath and dyspnea on exertion  GASTROINTESTINAL: As noted in the History of Present Illness  GENITOURINARY: No problems with urination  Denies any hematuria or dysuria  NEUROLOGIC: No dizziness or vertigo, denies headaches  MUSCULOSKELETAL: Denies any muscle or joint pain  SKIN: Denies skin rashes or itching  ENDOCRINE: Denies excessive thirst  Denies intolerance to heat or cold  PSYCHOSOCIAL: Denies depression or anxiety  Denies any recent memory loss         Historical Information   Past Medical History:   Diagnosis Date    Anemia     BPH (benign prostatic hyperplasia)     Cancer of lung ([address] )     Disease of thyroid gland     GAVE (gastric antral vascular ectasia)     Hypertension     Osseous metastasis (HCC)     Spinal stenosis     Tobacco abuse      Past Surgical History:   Procedure Laterality Date    ESOPHAGOGASTRODUODENOSCOPY N/A 3/20/2018 Procedure: ESOPHAGOGASTRODUODENOSCOPY (EGD); Surgeon: Cullen Tilley MD;  Location: [facility]; Service: Gastroenterology     Social History   History   Alcohol Use No     History   Drug Use No     History   Smoking Status    Former Smoker    Packs/day: 0 50    Years: 15 00    Types: Cigarettes    Start date: 1968    Quit date: 1/11/2018   Smokeless Tobacco    Never Used     Comment: Quit December 2017     Family History   Problem Relation Age of Onset    Esophageal cancer Mother     Diabetes Father     No Known Problems Sister     No Known Problems Brother        Meds/Allergies     Prescriptions Prior to Admission   Medication    Artificial Saliva (BIOTENE MOISTURIZING MOUTH MT)    finasteride (PROSCAR) 5 mg tablet    folic acid (FOLVITE) 1 mg tablet    levothyroxine 125 mcg tablet    oxybutynin (DITROPAN XL) 10 MG 24 hr tablet    senna 8 8 mg/5 mL syrup     Current Facility-Administered Medications   Medication Dose Route Frequency    acetaminophen (TYLENOL) tablet 650 mg  650 mg Oral Q8H [address]    enoxaparin (LOVENOX) subcutaneous injection 40 mg  40 mg Subcutaneous Daily    finasteride (PROSCAR) tablet 5 mg  5 mg Oral Daily    folic acid (FOLVITE) tablet 1 mg  1 mg Oral Daily    levothyroxine tablet 125 mcg  125 mcg Oral Early Morning    morphine injection 2 mg  2 mg Intravenous Q4H PRN    ondansetron (ZOFRAN) injection 4 mg  4 mg Intravenous Q6H PRN    oxybutynin (DITROPAN-XL) 24 hr tablet 10 mg  10 mg Oral HS    saliva substitute (MOUTH KOTE) mucosal solution 5 spray  5 spray Mouth/Throat PRN    senna oral syrup 8 8 mg  8 8 mg Oral HS       No Known Allergies        Objective     Blood pressure 139/83, pulse 98, temperature 98 7 °F (37 1 °C), temperature source Axillary, resp  rate 14, height 5' 7" (1 702 m), weight 66 7 kg (147 lb), SpO2 98 %      No intake or output data in the 24 hours ending 12/04/18 1331      PHYSICAL EXAM:      General Appearance:   Alert, cooperative, no distress, appears stated age; chronically ill-appearing    HEENT:   Normocephalic, atraumatic, anicteric, no oropharyngeal thrush present      Neck:  Supple, symmetrical, trachea midline, no adenopathy;    thyroid: no enlargement/tenderness/nodules; no carotid  bruit or JVD    Lungs:   crackles bilaterally; no rales, rhonchi or wheezing; respirations unlabored    Heart[de-identified]   S1 and S2 normal; regular rhythm, tachycardic; no murmur, rub, or gallop  Abdomen:   Soft, non-tender, non-distended; normal bowel sounds; no masses, no organomegaly    Genitalia:   Deferred    Rectal:   Deferred    Extremities:  No cyanosis, clubbing or edema    Pulses:  2+ and symmetric all extremities    Skin:  Skin color, texture, turgor normal, no rashes or lesions    Lymph nodes:  No palpable cervical, axillary or inguinal lymphadenopathy        Lab Results:     Results from last 7 days  Lab Units 12/04/18  0845   WBC Thousand/uL 8 27   HEMOGLOBIN g/dL 10 2*   HEMATOCRIT % 33 9*   PLATELETS Thousands/uL 331   NEUTROS PCT % 83*   LYMPHS PCT % 5*   MONOS PCT % 8   EOS PCT % 3       Results from last 7 days  Lab Units 12/04/18  0845   POTASSIUM mmol/L 4 6   CHLORIDE mmol/L 100   CO2 mmol/L 30   BUN mg/dL 20   CREATININE mg/dL 1 29   CALCIUM mg/dL 10 1   ALK PHOS U/L 104   ALT U/L 33   AST U/L 22               Imaging Studies: I have personally reviewed pertinent imaging studies  No results found  Patient was seen and examined by Dr Arnoldo Laguerre  All lerma medical decisions were made by Dr Arnoldo Laguerre  Thank you for allowing us to participate in the care of this present patient  We will follow-up with you closely  Never

## 2024-05-10 NOTE — H&P CARDIOLOGY - HISTORY OF PRESENT ILLNESS
Ventricular tachycardia 69 year old female with a PMHx of left breast cancer (s/p XRT and lumpectomy; 2009), BioMVR with MAZE (Bovine) in 2013 & bioprosthetic MVR (2nd time, most recently 1/24 in [facility] by Dr. Jimenez), pAF (on Eliquis), HTN, acute on chronic diastolic CHF exacerbation, with SARINA showing severe prosthetic mitral stenosis and severe TR, s/p LHC with no obstructive CAD presents for Aflutter ablation.   Pt was evaluated by Dr. Cordova for consultation for drug refractory atrial tachycardia in the setting of previous bioprosthetic MVR (2nd time, most recently 1/24). Pre procedure SARINA showed low normal LV systolic function, normal RV, severely dilated LA and severe bioprosthetic valve stenosis. She is s/p redo sternotomy with bio MVR/TV repair and as well as NOA clipping 1/31/2024 at [facility]. Immediately post surgery she was in SR. EKG from 2/12/2024 demonstrated an atypical AFL with subsequent rhythm strips/EKGs showing persistent atypical AFL with  bpm. She did have AFL on EKG pre-op in August 2023 and underwent a DCCV in 12/2023. She remains on Eliquis and diltiazem and metoprolol. Digoxin was recently added to her medication regimen, despite which HR remains at 110 bpm, She reports occasional SOB and fatigue but no chest pain or syncope. An intra-op SARINA demonstrated NOA was ligated with a residual pouch..  69 year old female with a PMHx of left breast cancer (s/p XRT and lumpectomy; 2009), BioMVR with MAZE (Bovine) in 2013 & bioprosthetic MVR (2nd time, most recently 1/24 in [facility] by Dr. Jimenez), pAF (on Eliquis), Aflutter s/p DCCV 12/2023, HTN, acute on chronic diastolic CHF exacerbation, with SARINA showing severe prosthetic mitral stenosis and severe TR, s/p LHC with no obstructive CAD presents for Aflutter ablation.   Pt was evaluated by Dr. Cordova for consultation for drug refractory atrial tachycardia post MVR in 1/24, decided to have aflutter ablation. Pt denies chest pain, SOB or palpitation but c/o bilateral LE edema.          69 year old female with a PMHx of left breast cancer (s/p XRT and lumpectomy; 2009), BioMVR with MAZE (Bovine) in 2013 & bioprosthetic MVR (2nd time, most recently 1/24 in [facility] by Dr. Jimenez), pAF (on Eliquis), Aflutter s/p DCCV 12/2023, HTN, acute on chronic diastolic CHF exacerbation, with SARINA showing severe prosthetic mitral stenosis and severe TR, s/p LHC with no obstructive CAD presents for Aflutter ablation.   Pt was evaluated by Dr. Cordova for consultation for drug refractory atrial tachycardia post MVR in 1/24, decided to have aflutter ablation. Pt denies chest pain, SOB or palpitation but c/o bilateral LE edema.     < from: TTE Limited W or WO Ultrasound Enhancing Agent (02.05.24 @ 15:12) >   1. Left ventricular cavity is normal. Left ventricular systolic function is normal with an ejection fraction visually estimated at 50 to 55 %.   2. Normal right ventricular cavity size and borderline reduced systolic function.   3. The left atrium is severely dilated.  4. The right atrium is mildly dilated.   5. Bioprosthetic valve present.Giovanna-Jackson Perimount valve replacement in the mitral position. Transvalvular mitral gradients are elevated. There is No intravalvular mitral regurgitation. Doppler profile of the bioprosthetic MV should prompt a closer evaluation of valve function and/or other considerations such us increased flow, increased heart rate or PPM.   6. No pericardial effusion seen.   7.Aortic arch diameter is dilated, measuring 3.6 cm (indexed 1.85 cm/m²).   8. Compared to the transthoracic echocardiogram performed on 1/6/2024, < end of copied text >

## 2024-05-10 NOTE — H&P CARDIOLOGY - NSICDXPASTMEDICALHX_GEN_ALL_CORE_FT
PAST MEDICAL HISTORY:  Afib     Atrial flutter     Breast cancer left; s/p lumpectomy and XRT    HLD (hyperlipidemia)     HTN (hypertension)     Mitral valve insufficiency     Tricuspid valve insufficiency

## 2024-05-10 NOTE — PRE-ANESTHESIA EVALUATION ADULT - NSANTHRISKNONERD_GEN_ALL_CORE
Reason for Call:  Other returning call    Detailed comments: Transferring to RN    Phone Number Patient can be reached at: Home number on file [phone] (home)    Best Time: Any    Can we leave a detailed message on this number? Not Applicable    Call taken on 11/15/2019 at 9:20 AM by Demetrice Reyes       No risk alerts present

## 2024-05-11 ENCOUNTER — NON-APPOINTMENT (OUTPATIENT)
Age: 69
End: 2024-05-11

## 2024-05-11 ENCOUNTER — TRANSCRIPTION ENCOUNTER (OUTPATIENT)
Age: 69
End: 2024-05-11

## 2024-05-11 VITALS
SYSTOLIC BLOOD PRESSURE: 150 MMHG | DIASTOLIC BLOOD PRESSURE: 77 MMHG | TEMPERATURE: 98 F | HEART RATE: 77 BPM | RESPIRATION RATE: 17 BRPM | OXYGEN SATURATION: 96 %

## 2024-05-11 LAB
ANION GAP SERPL CALC-SCNC: 12 MMOL/L — SIGNIFICANT CHANGE UP (ref 5–17)
BUN SERPL-MCNC: 28 MG/DL — HIGH (ref 7–23)
CALCIUM SERPL-MCNC: 8.9 MG/DL — SIGNIFICANT CHANGE UP (ref 8.4–10.5)
CHLORIDE SERPL-SCNC: 105 MMOL/L — SIGNIFICANT CHANGE UP (ref 96–108)
CO2 SERPL-SCNC: 24 MMOL/L — SIGNIFICANT CHANGE UP (ref 22–31)
CREAT SERPL-MCNC: 1.05 MG/DL — SIGNIFICANT CHANGE UP (ref 0.5–1.3)
EGFR: 58 ML/MIN/1.73M2 — LOW
GLUCOSE SERPL-MCNC: 136 MG/DL — HIGH (ref 70–99)
HCT VFR BLD CALC: 29.4 % — LOW (ref 34.5–45)
HGB BLD-MCNC: 9.7 G/DL — LOW (ref 11.5–15.5)
MAGNESIUM SERPL-MCNC: 2 MG/DL — SIGNIFICANT CHANGE UP (ref 1.6–2.6)
MCHC RBC-ENTMCNC: 30 PG — SIGNIFICANT CHANGE UP (ref 27–34)
MCHC RBC-ENTMCNC: 33 GM/DL — SIGNIFICANT CHANGE UP (ref 32–36)
MCV RBC AUTO: 91 FL — SIGNIFICANT CHANGE UP (ref 80–100)
NRBC # BLD: 0 /100 WBCS — SIGNIFICANT CHANGE UP (ref 0–0)
PLATELET # BLD AUTO: 181 K/UL — SIGNIFICANT CHANGE UP (ref 150–400)
POTASSIUM SERPL-MCNC: 4.4 MMOL/L — SIGNIFICANT CHANGE UP (ref 3.5–5.3)
POTASSIUM SERPL-SCNC: 4.4 MMOL/L — SIGNIFICANT CHANGE UP (ref 3.5–5.3)
RBC # BLD: 3.23 M/UL — LOW (ref 3.8–5.2)
RBC # FLD: 13.6 % — SIGNIFICANT CHANGE UP (ref 10.3–14.5)
SODIUM SERPL-SCNC: 141 MMOL/L — SIGNIFICANT CHANGE UP (ref 135–145)
WBC # BLD: 6.59 K/UL — SIGNIFICANT CHANGE UP (ref 3.8–10.5)
WBC # FLD AUTO: 6.59 K/UL — SIGNIFICANT CHANGE UP (ref 3.8–10.5)

## 2024-05-11 PROCEDURE — 86900 BLOOD TYPING SEROLOGIC ABO: CPT

## 2024-05-11 PROCEDURE — 80048 BASIC METABOLIC PNL TOTAL CA: CPT

## 2024-05-11 PROCEDURE — 93320 DOPPLER ECHO COMPLETE: CPT

## 2024-05-11 PROCEDURE — 93325 DOPPLER ECHO COLOR FLOW MAPG: CPT

## 2024-05-11 PROCEDURE — C1889: CPT

## 2024-05-11 PROCEDURE — 86850 RBC ANTIBODY SCREEN: CPT

## 2024-05-11 PROCEDURE — C1759: CPT

## 2024-05-11 PROCEDURE — 93312 ECHO TRANSESOPHAGEAL: CPT

## 2024-05-11 PROCEDURE — 93005 ELECTROCARDIOGRAM TRACING: CPT

## 2024-05-11 PROCEDURE — 83735 ASSAY OF MAGNESIUM: CPT

## 2024-05-11 PROCEDURE — 85027 COMPLETE CBC AUTOMATED: CPT

## 2024-05-11 PROCEDURE — 93662 INTRACARDIAC ECG (ICE): CPT

## 2024-05-11 PROCEDURE — C1894: CPT

## 2024-05-11 PROCEDURE — 36415 COLL VENOUS BLD VENIPUNCTURE: CPT

## 2024-05-11 PROCEDURE — C1766: CPT

## 2024-05-11 PROCEDURE — C1730: CPT

## 2024-05-11 PROCEDURE — C1732: CPT

## 2024-05-11 PROCEDURE — 93653 COMPRE EP EVAL TX SVT: CPT

## 2024-05-11 PROCEDURE — 86901 BLOOD TYPING SEROLOGIC RH(D): CPT

## 2024-05-11 RX ORDER — LANOLIN ALCOHOL/MO/W.PET/CERES
5 CREAM (GRAM) TOPICAL AT BEDTIME
Refills: 0 | Status: DISCONTINUED | OUTPATIENT
Start: 2024-05-11 | End: 2024-05-11

## 2024-05-11 RX ADMIN — BENZOCAINE AND MENTHOL 1 LOZENGE: 5; 1 LIQUID ORAL at 00:13

## 2024-05-11 RX ADMIN — APIXABAN 5 MILLIGRAM(S): 2.5 TABLET, FILM COATED ORAL at 07:44

## 2024-05-11 RX ADMIN — Medication 50 MILLIGRAM(S): at 06:08

## 2024-05-11 RX ADMIN — Medication 5 MILLIGRAM(S): at 01:48

## 2024-05-11 RX ADMIN — Medication 325 MILLIGRAM(S): at 00:12

## 2024-05-11 NOTE — PROGRESS NOTE ADULT - ASSESSMENT
HPI: 69 year old female with a PMHx of left breast cancer (s/p XRT and lumpectomy; 2009), BioMVR with MAZE (Bovine) in 2013 & bioprosthetic MVR (2nd time, most recently 1/24 in [facility] by Dr. Jimenez), pAF (on Eliquis), Aflutter s/p DCCV 12/2023, HTN, acute on chronic diastolic CHF exacerbation, with SARINA showing severe prosthetic mitral stenosis and severe TR, s/p LHC with no obstructive CAD presents for Aflutter ablation.   Pt was evaluated by Dr. Cordova for consultation for drug refractory atrial tachycardia post MVR in 1/24, decided to have aflutter ablation. Pt denies chest pain, SOB or palpitation but c/o bilateral LE edema.   S/P Aflutter ablation via RFV    # Aflutter  5/10 s/p aflutter ablation via RFV  Right groin sutures removed- site without hematoma/bleeding  Continue Metoprolol 50 mg BID  Resume Eliquis 5 mg BID  Continue monitoring telemetry  Keep Potassium> 4.0 and Magnesium>2.0  Follow up w/ post procedure EP appt  Anticipate discharge home after IV abx complete, telemetry stable with stable vitals and labs, if site and condition remain stable    # HLD  Continue Atorvastatin 80 mg  DASH diet    Mino Calderon AGAP  Ext [phone]

## 2024-05-11 NOTE — DISCHARGE NOTE PROVIDER - CARE PROVIDER_API CALL
Raheel Cordova  Cardiac Electrophysiology  [address] [place] [address], [address] [place] [id number]  Phone: [phone]  Fax: [phone]  Scheduled Appointment: 06/26/2024 02:00 PM

## 2024-05-11 NOTE — DISCHARGE NOTE PROVIDER - NSDCMRMEDTOKEN_GEN_ALL_CORE_FT
apixaban 5 mg oral tablet: 1 tab(s) orally 2 times a day  cyclobenzaprine 5 mg oral tablet: 1 tab(s) orally 3 times a day as needed for Muscle Spasm  ferrous sulfate 325 mg (65 mg elemental iron) oral tablet: 1 tab(s) orally once a day  folic acid 1 mg oral tablet: 1 tab(s) orally once a day  Lopressor 50 mg oral tablet: 1 tab(s) orally 2 times a day  rosuvastatin 20 mg oral tablet: 1 tab(s) orally once a day

## 2024-05-11 NOTE — DISCHARGE NOTE NURSING/CASE MANAGEMENT/SOCIAL WORK - PATIENT PORTAL LINK FT
You can access the FollowMyHealth Patient Portal offered by [facility] by registering at the following website: [email or web address]. By joining [employer]’s FollowMyHealth portal, you will also be able to view your health information using other applications (apps) compatible with our system.

## 2024-05-11 NOTE — DISCHARGE NOTE PROVIDER - NSDCCPCAREPLAN_GEN_ALL_CORE_FT
PRINCIPAL DISCHARGE DIAGNOSIS  Diagnosis: Atrial flutter  Assessment and Plan of Treatment: Your heart rate and rhythm will be controlled.  Continue with your cardiologist and primary care MD. Continue your current medications. Call your physician for palpitations, feelings of rapid heart beat, lightheadedness, or dizziness. Continue your anticoagulation medication as instructed.  Your heartbeat will be controlled. Your catheter/groin site will be free of infection and severe bleeding.

## 2024-05-11 NOTE — DISCHARGE NOTE PROVIDER - HOSPITAL COURSE
HPI: 69 year old female with a PMHx of left breast cancer (s/p XRT and lumpectomy; 2009), BioMVR with MAZE (Bovine) in 2013 & bioprosthetic MVR (2nd time, most recently 1/24 in [facility] by Dr. Jimenez), pAF (on Eliquis), Aflutter s/p DCCV 12/2023, HTN, acute on chronic diastolic CHF exacerbation, with SARINA showing severe prosthetic mitral stenosis and severe TR, s/p LHC with no obstructive CAD presents for Aflutter ablation.   Pt was evaluated by Dr. Cordova for consultation for drug refractory atrial tachycardia post MVR in 1/24, decided to have aflutter ablation. Pt denies chest pain, SOB or palpitation but c/o bilateral LE edema.     5/11 s/p Aflutter ablation via RFV, site without hematoma/bleeding. Patient without complaint, hemodynamically stable. Pending discharge in AM HPI: 69 year old female with a PMHx of left breast cancer (s/p XRT and lumpectomy; 2009), BioMVR with MAZE (Bovine) in 2013 & bioprosthetic MVR (2nd time, most recently 1/24 in [facility] by Dr. Jimenez), pAF (on Eliquis), Aflutter s/p DCCV 12/2023, HTN, acute on chronic diastolic CHF exacerbation, with SARINA showing severe prosthetic mitral stenosis and severe TR, s/p LHC with no obstructive CAD presents for Aflutter ablation.   Pt was evaluated by Dr. Cordova for consultation for drug refractory atrial tachycardia post MVR in 1/24, decided to have aflutter ablation. Pt denies chest pain, SOB or palpitation but c/o bilateral LE edema.     5/11 s/p Aflutter ablation via RFV, site without hematoma/bleeding. Patient without complaint, hemodynamically stable.   Case reviewed with EP attending Dr Wray- patient stable for discharge today.

## 2024-05-11 NOTE — DISCHARGE NOTE PROVIDER - NSDCFUSCHEDAPPT_GEN_ALL_CORE_FT
Raheel Cordova  [facility]  ELECTROPH [address]  Scheduled Appointment: 06/26/2024

## 2024-05-11 NOTE — PROVIDER CONTACT NOTE (OTHER) - ASSESSMENT
pt a/ox4; VSS- documented in VS flowsheet. pt endorses feeling "very hot". pt flushed and warm to the touch. pt also states she feels short of breath. Oxygen saturation WDL- applied 1L NC for comfort
pt a/ox4; pt endorsed feeling dizzy while in bathroom, pt with noted facial paleness.  BP assessed while back in bed: 92/52.   repeat BP while lying flat for approximately 5 minutes: 121/58

## 2024-05-11 NOTE — PROGRESS NOTE ADULT - SUBJECTIVE AND OBJECTIVE BOX
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 Jimenez), pAF (on Eliquis), Aflutter s/p DCCV 12/2023, HTN, acute on chronic diastolic CHF exacerbation, with SARINA showing severe prosthetic mitral stenosis and severe TR, s/p LHC with no obstructive CAD presents for Aflutter ablation.   Pt was evaluated by Dr. Cordova for consultation for drug refractory atrial tachycardia post MVR in 1/24, decided to have aflutter ablation. Pt denies chest pain, SOB or palpitation but c/o bilateral LE edema.     Subjective/Observations: patient seen and examined.  denies chest pain, dyspena, dizziness, palpitations, N&V, HA    Review of Systems all WNL except below indicated:    Constitutional: [ ] Fever [ ] Chills [ ] Fatigue [ ] Weight change   HEENT: [ ] Blurred vision [ ] Eye Pain [ ] Headache [ ] Runny nose [ ] Sore Throat   Respiratory: [ ] Cough [ ] Wheezing [ ] Shortness of breath  Cardiovascular: [ ] Chest Pain [ ] Palpitations [ ] STEWART [ ] PND [ ] Orthopnea  Gastrointestinal: [ ] Abdominal Pain [ ] Diarrhea [ ] Constipation [ ] Hemorrhoids [ ] Nausea [ ] Vomiting  Genitourinary: [ ] Nocturia [ ] Dysuria [ ] Incontinence  Extremities: [ ] Swelling [ ] Joint Pain  Neurologic: [ ] Focal deficit [ ] Paresthesias [ ] Syncope  Lymphatic: [ ] Swelling [ ] Lymphadenopathy   Skin: [ ] Rash [ ] Ecchymoses [ ] Wounds [ ] Lesions  Psychiatry: [ ] Depression [ ] Suicidal/Homicidal Ideation [ ] Anxiety [ ] Sleep Disturbances  [ ] 10 point review of systems is otherwise negative except as mentioned above            [ ]Unable to obtain    PAST MEDICAL & SURGICAL HISTORY:  Afib    Breast cancer  left; s/p lumpectomy and XRT    Mitral valve insufficiency    Tricuspid valve insufficiency    HTN (hypertension)    HLD (hyperlipidemia)    Atrial flutter    S/P breast lumpectomy  left    H/O prosthetic mitral valve  bioprosthetic bovine 2013    History of cardiac cath    H/O right wrist surgery    MEDICATIONS  (STANDING):  apixaban 5 milliGRAM(s) Oral every 12 hours  atorvastatin 80 milliGRAM(s) Oral at bedtime  ferrous    sulfate 325 milliGRAM(s) Oral daily  folic acid 1 milliGRAM(s) Oral daily  melatonin 5 milliGRAM(s) Oral at bedtime  metoprolol tartrate 50 milliGRAM(s) Oral two times a day    MEDICATIONS  (PRN):  cyclobenzaprine 5 milliGRAM(s) Oral three times a day PRN Muscle Spasm    Allergies    No Known Allergies    Intolerances    Vital Signs Last 24 Hrs  T(C): 36.6 (11 May 2024 00:00), Max: 36.8 (10 May 2024 10:01)  T(F): 97.8 (11 May 2024 00:00), Max: 98.3 (10 May 2024 10:01)  HR: 89 (11 May 2024 00:00) (66 - 90)  BP: 112/61 (11 May 2024 00:00) (98/57 - 120/84)  BP(mean): 78 (10 May 2024 18:10) (76 - 96)  RR: 17 (11 May 2024 00:00) (16 - 18)  SpO2: 92% (11 May 2024 00:00) (92% - 98%)    Parameters below as of 11 May 2024 00:00  Patient On (Oxygen Delivery Method): room air    I&O's Summary    Weight (kg): 73.5 (05-10 @ 11:01)    FOCUSED PHYSICAL EXAM:  Neuro: No apparent distress, alert and oriented times x3, appropriate affect  Pulmonary: Non-labored, breath sounds are clear bilaterally, No wheezing, rales or rhonchi  Cardiovascular: Regular, S1 and S2, No murmurs, rubs, gallops or clicks  Site: Right groin: Soft, non tender, no bleeding or hematoma. Pulses in the right lower extremity are palpable.   No clubbing, cyanosis or edema  Suture right groin removed    LABS: All Labs Reviewed:                        9.7    6.59  )-----------( 181      ( 11 May 2024 00:23 )             29.4                         11.4   6.29  )-----------( 227      ( 10 May 2024 10:17 )             35.3     11 May 2024 00:23    141    |  105    |  28     ----------------------------<  136    4.4     |  24     |  1.05   10 May 2024 10:17    139    |  102    |  21     ----------------------------<  99     4.0     |  27     |  0.80     Ca    8.9        11 May 2024 00:23  Ca    9.7        10 May 2024 10:17  Mg     2.0       11 May 2024 00:23    RESULTS:    ECHO:  < from: TTE Limited W or WO Ultrasound Enhancing Agent (02.05.24 @ 15:12) >   1. Left ventricular cavity is normal. Left ventricular systolic function is normal with an ejection fraction visually estimated at 50 to 55 %.   2. Normal right ventricular cavity size and borderline reduced systolic function.   3. The left atrium is severely dilated.  4. The right atrium is mildly dilated.   5. Bioprosthetic valve present.Giovanna-Jackson Perimount valve replacement in the mitral position. Transvalvular mitral gradients are elevated. There is No intravalvular mitral regurgitation. Doppler profile of the bioprosthetic MV should prompt a closer evaluation of valve function and/or other considerations such us increased flow, increased heart rate or PPM.   6. No pericardial effusion seen.   7.Aortic arch diameter is dilated, measuring 3.6 cm (indexed 1.85 cm/m²).   8. Compared to the transthoracic echocardiogram performed on 1/6/2024,

## 2024-05-11 NOTE — DISCHARGE NOTE PROVIDER - NSDCCPTREATMENT_GEN_ALL_CORE_FT
PRINCIPAL PROCEDURE  Procedure: Cardiac ablation  Findings and Treatment: 5/10 s/p aflutter ablation via RFV

## 2024-05-13 ENCOUNTER — OFFICE (OUTPATIENT)
Dept: URBAN - METROPOLITAN AREA CLINIC 103 | Facility: CLINIC | Age: 69
Setting detail: OPHTHALMOLOGY
End: 2024-05-13
Payer: MEDICARE

## 2024-05-13 DIAGNOSIS — H43.393: ICD-10-CM

## 2024-05-13 DIAGNOSIS — H16.223: ICD-10-CM

## 2024-05-13 DIAGNOSIS — D31.31: ICD-10-CM

## 2024-05-13 PROCEDURE — 92012 INTRM OPH EXAM EST PATIENT: CPT | Performed by: OPHTHALMOLOGY

## 2024-05-13 PROCEDURE — 92250 FUNDUS PHOTOGRAPHY W/I&R: CPT | Performed by: OPHTHALMOLOGY

## 2024-05-13 ASSESSMENT — CONFRONTATIONAL VISUAL FIELD TEST (CVF)
OD_FINDINGS: FULL
OS_FINDINGS: FULL

## 2024-05-14 ENCOUNTER — OFFICE (OUTPATIENT)
Dept: URBAN - METROPOLITAN AREA CLINIC 103 | Facility: CLINIC | Age: 69
Setting detail: OPHTHALMOLOGY
End: 2024-05-14
Payer: MEDICARE

## 2024-05-14 ENCOUNTER — RX ONLY (RX ONLY)
Age: 69
End: 2024-05-14

## 2024-05-14 DIAGNOSIS — H10.211: ICD-10-CM

## 2024-05-14 DIAGNOSIS — H01.004: ICD-10-CM

## 2024-05-14 DIAGNOSIS — H10.89: ICD-10-CM

## 2024-05-14 DIAGNOSIS — H01.001: ICD-10-CM

## 2024-05-14 PROCEDURE — 99213 OFFICE O/P EST LOW 20 MIN: CPT | Performed by: OPTOMETRIST

## 2024-05-14 RX ORDER — DIGOXIN 250 MCG
1 TABLET ORAL
Refills: 0 | DISCHARGE

## 2024-05-14 RX ORDER — METOPROLOL TARTRATE 50 MG
1 TABLET ORAL
Refills: 0 | DISCHARGE

## 2024-05-14 ASSESSMENT — LID EXAM ASSESSMENTS
OD_BLEPHARITIS: RUL 4+
OS_BLEPHARITIS: LUL 4+

## 2024-05-21 ENCOUNTER — RX ONLY (RX ONLY)
Age: 69
End: 2024-05-21

## 2024-05-21 ENCOUNTER — OFFICE (OUTPATIENT)
Dept: URBAN - METROPOLITAN AREA CLINIC 103 | Facility: CLINIC | Age: 69
Setting detail: OPHTHALMOLOGY
End: 2024-05-21
Payer: MEDICARE

## 2024-05-21 DIAGNOSIS — H01.001: ICD-10-CM

## 2024-05-21 DIAGNOSIS — H10.211: ICD-10-CM

## 2024-05-21 DIAGNOSIS — H01.004: ICD-10-CM

## 2024-05-21 PROBLEM — H10.89 BACTERIAL CONJUNCTIVITIS ; BOTH EYES: Status: ACTIVE | Noted: 2024-05-14

## 2024-05-21 PROBLEM — H43.393 VITREOUS FLOATERS; BOTH EYES: Status: ACTIVE | Noted: 2024-05-13

## 2024-05-21 PROCEDURE — 99212 OFFICE O/P EST SF 10 MIN: CPT | Performed by: OPTOMETRIST

## 2024-05-21 ASSESSMENT — CONFRONTATIONAL VISUAL FIELD TEST (CVF)
OD_FINDINGS: FULL
OS_FINDINGS: FULL

## 2024-05-21 ASSESSMENT — LID EXAM ASSESSMENTS
OS_BLEPHARITIS: LUL 4+
OD_BLEPHARITIS: RUL 4+

## 2024-06-26 ENCOUNTER — NON-APPOINTMENT (OUTPATIENT)
Age: 69
End: 2024-06-26

## 2024-06-26 ENCOUNTER — APPOINTMENT (OUTPATIENT)
Dept: ELECTROPHYSIOLOGY | Facility: CLINIC | Age: 69
End: 2024-06-26
Payer: MEDICARE

## 2024-06-26 VITALS
BODY MASS INDEX: 24.25 KG/M2 | DIASTOLIC BLOOD PRESSURE: 86 MMHG | HEART RATE: 60 BPM | SYSTOLIC BLOOD PRESSURE: 147 MMHG | HEIGHT: 68 IN | WEIGHT: 160 LBS | OXYGEN SATURATION: 9 %

## 2024-06-26 DIAGNOSIS — I47.19 OTHER SUPRAVENTRICULAR TACHYCARDIA: ICD-10-CM

## 2024-06-26 DIAGNOSIS — I07.1 RHEUMATIC TRICUSPID INSUFFICIENCY: ICD-10-CM

## 2024-06-26 DIAGNOSIS — Z95.2 PRESENCE OF PROSTHETIC HEART VALVE: ICD-10-CM

## 2024-06-26 PROCEDURE — 93000 ELECTROCARDIOGRAM COMPLETE: CPT

## 2024-06-26 PROCEDURE — 99214 OFFICE O/P EST MOD 30 MIN: CPT

## 2024-06-26 RX ORDER — FOLIC ACID 1 MG/1
1 TABLET ORAL
Refills: 0 | Status: DISCONTINUED | COMMUNITY
End: 2024-06-26

## 2024-06-26 RX ORDER — POTASSIUM CHLORIDE 1500 MG/1
20 TABLET, EXTENDED RELEASE ORAL
Refills: 0 | Status: DISCONTINUED | COMMUNITY
End: 2024-06-26

## 2024-06-26 RX ORDER — TRAMADOL HYDROCHLORIDE 50 MG/1
50 TABLET, COATED ORAL
Refills: 0 | Status: DISCONTINUED | COMMUNITY
End: 2024-06-26

## 2024-06-26 RX ORDER — FERROUS SULFATE TAB EC 324 MG (65 MG FE EQUIVALENT) 324 (65 FE) MG
324 (65 FE) TABLET DELAYED RESPONSE ORAL
Refills: 0 | Status: DISCONTINUED | COMMUNITY
End: 2024-06-26

## 2024-06-26 RX ORDER — ASPIRIN ENTERIC COATED TABLETS 81 MG 81 MG/1
81 TABLET, DELAYED RELEASE ORAL
Refills: 0 | Status: DISCONTINUED | COMMUNITY
End: 2024-06-26

## 2024-06-26 RX ORDER — DIGOXIN 250 UG/1
250 TABLET ORAL DAILY
Qty: 30 | Refills: 0 | Status: DISCONTINUED | COMMUNITY
Start: 2024-04-30 | End: 2024-06-26

## 2024-06-27 ENCOUNTER — OFFICE (OUTPATIENT)
Dept: URBAN - METROPOLITAN AREA CLINIC 103 | Facility: CLINIC | Age: 69
Setting detail: OPHTHALMOLOGY
End: 2024-06-27
Payer: MEDICARE

## 2024-06-27 DIAGNOSIS — H16.223: ICD-10-CM

## 2024-06-27 PROCEDURE — 92012 INTRM OPH EXAM EST PATIENT: CPT | Performed by: OPHTHALMOLOGY

## 2024-06-27 ASSESSMENT — CONFRONTATIONAL VISUAL FIELD TEST (CVF)
OS_FINDINGS: FULL
OD_FINDINGS: FULL

## 2024-08-30 NOTE — PROGRESS NOTE ADULT - PROBLEM SELECTOR PROBLEM 6
Paroxysmal atrial fibrillation
Never

## 2024-09-04 ENCOUNTER — OFFICE (OUTPATIENT)
Dept: URBAN - METROPOLITAN AREA CLINIC 103 | Facility: CLINIC | Age: 69
Setting detail: OPHTHALMOLOGY
End: 2024-09-04
Payer: MEDICARE

## 2024-09-04 DIAGNOSIS — H16.223: ICD-10-CM

## 2024-09-04 DIAGNOSIS — D31.31: ICD-10-CM

## 2024-09-04 DIAGNOSIS — H01.001: ICD-10-CM

## 2024-09-04 DIAGNOSIS — H01.004: ICD-10-CM

## 2024-09-04 DIAGNOSIS — H43.392: ICD-10-CM

## 2024-09-04 PROCEDURE — 92014 COMPRE OPH EXAM EST PT 1/>: CPT | Performed by: OPHTHALMOLOGY

## 2024-09-04 PROCEDURE — 92201 OPSCPY EXTND RTA DRAW UNI/BI: CPT | Performed by: OPHTHALMOLOGY

## 2024-09-04 ASSESSMENT — CONFRONTATIONAL VISUAL FIELD TEST (CVF)
OS_FINDINGS: FULL
OD_FINDINGS: FULL

## 2025-03-05 ENCOUNTER — OFFICE (OUTPATIENT)
Dept: URBAN - METROPOLITAN AREA CLINIC 103 | Facility: CLINIC | Age: 70
Setting detail: OPHTHALMOLOGY
End: 2025-03-05
Payer: MEDICARE

## 2025-03-05 DIAGNOSIS — H01.001: ICD-10-CM

## 2025-03-05 DIAGNOSIS — H16.223: ICD-10-CM

## 2025-03-05 DIAGNOSIS — H01.004: ICD-10-CM

## 2025-03-05 PROCEDURE — 99213 OFFICE O/P EST LOW 20 MIN: CPT | Performed by: OPHTHALMOLOGY

## 2025-03-05 ASSESSMENT — KERATOMETRY
OD_AXISANGLE_DEGREES: 065
METHOD_AUTO_MANUAL: AUTO
OS_AXISANGLE_DEGREES: 119
OD_K2POWER_DIOPTERS: 44.25
OS_K2POWER_DIOPTERS: 44.25
OD_K1POWER_DIOPTERS: 43.50
OS_K1POWER_DIOPTERS: 44.00

## 2025-03-05 ASSESSMENT — REFRACTION_MANIFEST
OD_CYLINDER: SPH
OS_CYLINDER: SPH
OS_VA1: 20/25-2
OD_VA1: 20/25-1
OD_SPHERE: PLANO
OS_VA1: 20/20
OS_CYLINDER: -0.75
OS_ADD: +2.25
OD_SPHERE: +0.75
OU_VA: 20/20-1
OS_SPHERE: +0.50
OS_AXIS: 35
OD_ADD: +2.25
OD_CYLINDER: -0.50
OD_VA1: 20/20-
OS_SPHERE: PLANO
OD_AXIS: 120

## 2025-03-05 ASSESSMENT — REFRACTION_CURRENTRX
OD_OVR_VA: 20/
OS_OVR_VA: 20/
OS_ADD: +1.50
OD_VPRISM_DIRECTION: SV
OS_VPRISM_DIRECTION: SV
OD_ADD: +1.50

## 2025-03-05 ASSESSMENT — SUPERFICIAL PUNCTATE KERATITIS (SPK)
OS_SPK: T
OD_SPK: T

## 2025-03-05 ASSESSMENT — CONFRONTATIONAL VISUAL FIELD TEST (CVF)
OS_FINDINGS: FULL
OD_FINDINGS: FULL

## 2025-03-05 ASSESSMENT — TONOMETRY
OS_IOP_MMHG: 17
OD_IOP_MMHG: 13

## 2025-03-05 ASSESSMENT — VISUAL ACUITY
OS_BCVA: 20/20
OD_BCVA: 20/20

## 2025-03-05 ASSESSMENT — REFRACTION_AUTOREFRACTION
OS_SPHERE: PL
OS_AXIS: 045
OD_CYLINDER: -0.25
OD_SPHERE: PL
OD_AXIS: 125
OS_CYLINDER: -0.25

## 2025-04-02 NOTE — DIETITIAN INITIAL EVALUATION ADULT - PERTINENT LABORATORY DATA
PRINCIPAL DISCHARGE DIAGNOSIS  Diagnosis: Swelling of lower extremity  Assessment and Plan of Treatment: Lymphedema is a chronic condition that causes swelling, most often in the arms or legs. It develops when the lymphatic system, which helps remove waste and fluids from the body, is damaged or blocked. This blockage prevents lymph fluid from draining properly, leading to a buildup and swelling in the affected area. You had an ultrasound of your legs that did not show signs of a blood clot. You continued wound care during the admission. Please follow up with the wound care clinic after rehab to continue treatment for your lymphedema and right leg wound.      SECONDARY DISCHARGE DIAGNOSES  Diagnosis: COVID-19 virus infection  Assessment and Plan of Treatment: You tested positive for covid 3/25. You received treatment with steroids and anti-viral medication during the hospital stay. You were asymptomatic at time of discharge.    Diagnosis: Altered mental status  Assessment and Plan of Treatment: You had an episode of confusion during the hospital stay. Your CT scan of your head did not find anything acute. The pulmonologist saw you during the hospital stay and you were started on BIPAP at night. You likely need to wear BIPAP everynight to prevent carbon dioxide (CO2) from building in your system, which can cause confusion when the levels are too high. Please see a pulmologist in clinic to get PFTs (pulmonary function test) and a sleep study to see if you have underlying sleep apnea.    Diagnosis: Lung nodule  Assessment and Plan of Treatment: You had a CT scan of your chest that demonstrated right lower lobe subpleural nodule measuring 0.4 cm. Please follow up with your PCP for further management.     02-02    134<L>  |  101  |  27.2<H>  ----------------------------<  104<H>  4.3   |  23.0  |  1.18    Ca    8.3<L>      02 Feb 2024 02:15  Phos  3.0     01-31  Mg     2.8     02-02    TPro  5.3<L>  /  Alb  3.6  /  TBili  0.8  /  DBili  x   /  AST  36<H>  /  ALT  14  /  AlkPhos  58  02-01  POCT Blood Glucose.: 101 mg/dL (02-02-24 @ 07:44)  A1C with Estimated Average Glucose Result: 5.8 % (01-30-24 @ 11:36)  A1C with Estimated Average Glucose Result: 5.6 % (01-18-24 @ 09:50)  A1C with Estimated Average Glucose Result: 5.6 % (01-05-24 @ 21:15)

## (undated) DEVICE — SUT ETHIBOND 2-0 30" V5 WHITE

## (undated) DEVICE — Device

## (undated) DEVICE — SUT ETHIBOND 3-0 36" RB-1

## (undated) DEVICE — SUT PROLENE 4-0 36" SH

## (undated) DEVICE — ELCTR BOVIE BLADE 3/4" EXTENDED LENGTH 6"

## (undated) DEVICE — PREP SCRUB BRUSH W CHG 4%

## (undated) DEVICE — POSITIONER FOAM EGG CRATE ULNAR 2PCS (PINK)

## (undated) DEVICE — STAPLER SKIN PROXIMATE

## (undated) DEVICE — SUT SILK 0 30" TIES

## (undated) DEVICE — DRSG 4 X 8

## (undated) DEVICE — WOUND IRR IRRISEPT W 0.5 CHG

## (undated) DEVICE — STEALTH CLAMP INSERT FIBRA/FIBRA 60MM

## (undated) DEVICE — STOPCOCK 3 WAY W SWIVEL MALE LUER LOCK

## (undated) DEVICE — GOWN TRIMAX LG

## (undated) DEVICE — NDL COUNTER FOAM AND MAGNET 40-70

## (undated) DEVICE — SUT ETHIBOND 2-0 30" SH-1 GREEN/WHITE

## (undated) DEVICE — SUT VICRYL 0 36" CTX UNDYED

## (undated) DEVICE — DRSG MEPILEX 10 X 30CM (4 X 12") WHITE

## (undated) DEVICE — URETERAL CATH RED RUBBER 18FR (RED)

## (undated) DEVICE — SUT SILK 2-0 18" SH (POP-OFF)

## (undated) DEVICE — SUT ETHIBOND 4-0 36" RB-1

## (undated) DEVICE — SUT SOFSILK 4-0 24" CV-15

## (undated) DEVICE — GOWN XL W TOWEL

## (undated) DEVICE — SUT SILK 5-0 60" TIES

## (undated) DEVICE — DRSG TEGADERM 4X4.75"

## (undated) DEVICE — SUT PLEDGET 9MM X 4MM X 1.5MM

## (undated) DEVICE — DRAPE TOWEL BLUE 17" X 24"

## (undated) DEVICE — PACK VALVE

## (undated) DEVICE — GLV 7.5 PROTEXIS (WHITE)

## (undated) DEVICE — SUT STAINLESS STEEL 5 18" SCC

## (undated) DEVICE — SUT PERMAHAND SILK 2 60" TIES

## (undated) DEVICE — VESSEL LOOP EXTRA MAXI-BLUE 0.200" X 22"

## (undated) DEVICE — SUT PROLENE 4-0 36" RB-1

## (undated) DEVICE — SUT SILK 0 30" KS

## (undated) DEVICE — MAXI-FLO SUCTION CATHETER KIT 14FR STRAIGHT

## (undated) DEVICE — SOL BAG NS 0.9% 1000ML

## (undated) DEVICE — SUT ETHIBOND 2-0 4-30" RB-1 GREEN

## (undated) DEVICE — SUT PDS II 2-0 27" CT-1

## (undated) DEVICE — SUT ETHIBOND 1 30" OS6

## (undated) DEVICE — SYR LUER LOK 20CC

## (undated) DEVICE — SOL INJ NS 0.9% 1000ML

## (undated) DEVICE — SPONGE PEANUT AUTO COUNT

## (undated) DEVICE — SUT PROLENE 3-0 36" SH

## (undated) DEVICE — CHEST DRAIN PLEUR-EVAC DRY/WET ADULT-PEDS SINGLE (QUICK)

## (undated) DEVICE — PHRENIC NERVE PAD MEDIUM

## (undated) DEVICE — SUT PROLENE 4-0 30" SH-1

## (undated) DEVICE — SUT VICRYL 1 36" CTX UNDYED

## (undated) DEVICE — SUT VICRYL 3-0 27" CT-1

## (undated) DEVICE — DRAPE CV 106" X 135"

## (undated) DEVICE — SUT PROLENE 5-0 30" RB-2

## (undated) DEVICE — DRAPE SLUSH / WARMER 44 X 66"

## (undated) DEVICE — SUT MONOCRYL 4-0 27" PS-2 UNDYED

## (undated) DEVICE — LAP PAD 18 X 18"

## (undated) DEVICE — SUT ETHIBOND 2-0 36" SH

## (undated) DEVICE — TUBING TRUWAVE PRESSURE MALE/FEMALE 72"

## (undated) DEVICE — SUT ETHIBOND 2-0 4-30" RB-1 WHITE

## (undated) DEVICE — SOL IRR POUR NS 0.9% 1000ML

## (undated) DEVICE — VISITEC 4X4

## (undated) DEVICE — PREP CHLORAPREP HI-LITE ORANGE 26ML

## (undated) DEVICE — DRSG OPSITE 13.75 X 4"

## (undated) DEVICE — SUT SILK 0 30" SH

## (undated) DEVICE — CONNECTOR "Y" 1/2 X 3/8 X 3/8"

## (undated) DEVICE — WARMING BLANKET DUO-THERM HYPER/HYPOTHERM ADULT

## (undated) DEVICE — SUT PROLENE 5-0 36" RB-1

## (undated) DEVICE — TUBING SUCTION 20FT

## (undated) DEVICE — VENTING ADAPTER "Y" (RED/BLUE) 7.5"

## (undated) DEVICE — SUT SOFSILK 4-0 30" TIES

## (undated) DEVICE — DRSG OPSITE 2.5 X 2"

## (undated) DEVICE — CONNECTOR STRAIGHT 3/8 X 1/2"

## (undated) DEVICE — MARKING PEN W RULER

## (undated) DEVICE — ELCTR MULTIFUNCTION DEFIBRILLATION ELECTRODE EDGE SYSTEM ADULT

## (undated) DEVICE — DRSG MEPILEX 10 X 10CM (4 X 4") AG

## (undated) DEVICE — TONGUE DEPRESSOR

## (undated) DEVICE — TUBING INSUFFLATION LAP FILTER 10FT

## (undated) DEVICE — SUT VICRYL 2-0 27" CT-1 UNDYED

## (undated) DEVICE — CONNECTOR STRAIGHT 3/8 X 3/8"

## (undated) DEVICE — PACK OPEN HEART VAMP PLUS